# Patient Record
Sex: FEMALE | Race: WHITE | NOT HISPANIC OR LATINO | Employment: UNEMPLOYED | URBAN - METROPOLITAN AREA
[De-identification: names, ages, dates, MRNs, and addresses within clinical notes are randomized per-mention and may not be internally consistent; named-entity substitution may affect disease eponyms.]

---

## 2019-06-21 NOTE — PRE-PROCEDURE INSTRUCTIONS
No outpatient medications have been marked as taking for the 6/28/19 encounter [facility] Encounter)  Spoke to pt  No reported medication use  As of 6/21 advised on tylenol only  Am DOS no meds  Showering instructions given at time of call  All instructions verbally understood by patient  No further questions

## 2019-06-27 ENCOUNTER — ANESTHESIA EVENT (OUTPATIENT)
Dept: PERIOP | Facility: HOSPITAL | Age: 19
DRG: 089 | End: 2019-06-27
Payer: COMMERCIAL

## 2019-06-28 ENCOUNTER — ANESTHESIA EVENT (INPATIENT)
Dept: PERIOP | Facility: HOSPITAL | Age: 19
DRG: 089 | End: 2019-06-28
Payer: COMMERCIAL

## 2019-06-28 ENCOUNTER — ANESTHESIA (INPATIENT)
Dept: PERIOP | Facility: HOSPITAL | Age: 19
DRG: 089 | End: 2019-06-28
Payer: COMMERCIAL

## 2019-06-28 ENCOUNTER — ANESTHESIA (OUTPATIENT)
Dept: PERIOP | Facility: HOSPITAL | Age: 19
DRG: 089 | End: 2019-06-28
Payer: COMMERCIAL

## 2019-06-28 ENCOUNTER — HOSPITAL ENCOUNTER (INPATIENT)
Facility: HOSPITAL | Age: 19
LOS: 10 days | Discharge: HOME | DRG: 089 | End: 2019-07-08
Attending: ORAL & MAXILLOFACIAL SURGERY | Admitting: ORAL & MAXILLOFACIAL SURGERY
Payer: COMMERCIAL

## 2019-06-28 ENCOUNTER — APPOINTMENT (INPATIENT)
Dept: RADIOLOGY | Facility: HOSPITAL | Age: 19
DRG: 089 | End: 2019-06-28
Payer: COMMERCIAL

## 2019-06-28 DIAGNOSIS — R11.2 POST-OPERATIVE NAUSEA AND VOMITING: ICD-10-CM

## 2019-06-28 DIAGNOSIS — K81.0 CHOLECYSTITIS, ACUTE: ICD-10-CM

## 2019-06-28 DIAGNOSIS — R50.9 FEVER: ICD-10-CM

## 2019-06-28 DIAGNOSIS — R74.8 ELEVATED LIVER ENZYMES: ICD-10-CM

## 2019-06-28 DIAGNOSIS — M26.9: Primary | ICD-10-CM

## 2019-06-28 DIAGNOSIS — D62 ACUTE BLOOD LOSS ANEMIA: ICD-10-CM

## 2019-06-28 DIAGNOSIS — J96.00 ACUTE RESPIRATORY FAILURE (HCC): ICD-10-CM

## 2019-06-28 DIAGNOSIS — T17.908A ASPIRATION INTO AIRWAY: ICD-10-CM

## 2019-06-28 DIAGNOSIS — E87.2 LACTIC ACIDOSIS: ICD-10-CM

## 2019-06-28 DIAGNOSIS — R00.0 TACHYCARDIA: ICD-10-CM

## 2019-06-28 DIAGNOSIS — Z98.890 POST-OPERATIVE NAUSEA AND VOMITING: ICD-10-CM

## 2019-06-28 LAB
ABO GROUP BLD: NORMAL
ANION GAP SERPL CALCULATED.3IONS-SCNC: 10 MMOL/L (ref 4–13)
ANION GAP SERPL CALCULATED.3IONS-SCNC: 6 MMOL/L (ref 4–13)
ARTERIAL PATENCY WRIST A: YES
ARTERIAL PATENCY WRIST A: YES
BASE EXCESS BLDA CALC-SCNC: -8.7 MMOL/L
BASE EXCESS BLDA CALC-SCNC: -9.3 MMOL/L
BASOPHILS # BLD MANUAL: 0 THOUSAND/UL (ref 0–0.1)
BASOPHILS NFR MAR MANUAL: 0 % (ref 0–1)
BLD GP AB SCN SERPL QL: NEGATIVE
BODY TEMPERATURE: 99.1 DEGREES FEHRENHEIT
BUN SERPL-MCNC: 12 MG/DL (ref 5–25)
BUN SERPL-MCNC: 13 MG/DL (ref 5–25)
CALCIUM SERPL-MCNC: 7.5 MG/DL (ref 8.3–10.1)
CALCIUM SERPL-MCNC: 9.3 MG/DL (ref 8.3–10.1)
CHLORIDE SERPL-SCNC: 106 MMOL/L (ref 100–108)
CHLORIDE SERPL-SCNC: 109 MMOL/L (ref 100–108)
CO2 SERPL-SCNC: 19 MMOL/L (ref 21–32)
CO2 SERPL-SCNC: 28 MMOL/L (ref 21–32)
CREAT SERPL-MCNC: 0.84 MG/DL (ref 0.6–1.3)
CREAT SERPL-MCNC: 1.05 MG/DL (ref 0.6–1.3)
EOSINOPHIL # BLD MANUAL: 0 THOUSAND/UL (ref 0–0.4)
EOSINOPHIL NFR BLD MANUAL: 0 % (ref 0–6)
ERYTHROCYTE [DISTWIDTH] IN BLOOD BY AUTOMATED COUNT: 12.7 % (ref 11.6–15.1)
ERYTHROCYTE [DISTWIDTH] IN BLOOD BY AUTOMATED COUNT: 12.8 % (ref 11.6–15.1)
ERYTHROCYTE [DISTWIDTH] IN BLOOD BY AUTOMATED COUNT: 12.9 % (ref 11.6–15.1)
EXT PREGNANCY TEST URINE: NEGATIVE
EXT. CONTROL: NORMAL
GFR SERPL CREATININE-BSD FRML MDRD: 100 ML/MIN/1.73SQ M
GFR SERPL CREATININE-BSD FRML MDRD: 77 ML/MIN/1.73SQ M
GLUCOSE P FAST SERPL-MCNC: 92 MG/DL (ref 65–99)
GLUCOSE SERPL-MCNC: 196 MG/DL (ref 65–140)
GLUCOSE SERPL-MCNC: 92 MG/DL (ref 65–140)
HCO3 BLDA-SCNC: 16.6 MMOL/L (ref 22–28)
HCO3 BLDA-SCNC: 17.2 MMOL/L (ref 22–28)
HCT VFR BLD AUTO: 27.7 % (ref 34.8–46.1)
HCT VFR BLD AUTO: 29 % (ref 34.8–46.1)
HCT VFR BLD AUTO: 41.8 % (ref 34.8–46.1)
HFNC FLOW LPM: 50
HGB BLD-MCNC: 13.7 G/DL (ref 11.5–15.4)
HGB BLD-MCNC: 8.9 G/DL (ref 11.5–15.4)
HGB BLD-MCNC: 9.4 G/DL (ref 11.5–15.4)
LACTATE SERPL-SCNC: 5.3 MMOL/L (ref 0.5–2)
LACTATE SERPL-SCNC: 7.2 MMOL/L (ref 0.5–2)
LYMPHOCYTES # BLD AUTO: 0.68 THOUSAND/UL (ref 0.6–4.47)
LYMPHOCYTES # BLD AUTO: 6 % (ref 14–44)
MCH RBC QN AUTO: 30 PG (ref 26.8–34.3)
MCH RBC QN AUTO: 30.5 PG (ref 26.8–34.3)
MCH RBC QN AUTO: 30.7 PG (ref 26.8–34.3)
MCHC RBC AUTO-ENTMCNC: 32.1 G/DL (ref 31.4–37.4)
MCHC RBC AUTO-ENTMCNC: 32.4 G/DL (ref 31.4–37.4)
MCHC RBC AUTO-ENTMCNC: 32.8 G/DL (ref 31.4–37.4)
MCV RBC AUTO: 92 FL (ref 82–98)
MCV RBC AUTO: 95 FL (ref 82–98)
MCV RBC AUTO: 95 FL (ref 82–98)
METAMYELOCYTES NFR BLD MANUAL: 1 % (ref 0–1)
MONOCYTES # BLD AUTO: 0.45 THOUSAND/UL (ref 0–1.22)
MONOCYTES NFR BLD: 4 % (ref 4–12)
MYELOCYTES NFR BLD MANUAL: 1 % (ref 0–1)
NEUTROPHILS # BLD MANUAL: 10 THOUSAND/UL (ref 1.85–7.62)
NEUTS BAND NFR BLD MANUAL: 26 % (ref 0–8)
NEUTS SEG NFR BLD AUTO: 62 % (ref 43–75)
NON VENT HFNC FIO2: 100
NON VENT TYPE HFNC: ABNORMAL
NON VENT TYPE- NRB: 100
NRBC BLD AUTO-RTO: 0 /100 WBCS
NRBC BLD AUTO-RTO: 0 /100 WBCS
O2 CT BLDA-SCNC: 12.7 ML/DL (ref 16–23)
O2 CT BLDA-SCNC: 13.1 ML/DL (ref 16–23)
OXYHGB MFR BLDA: 89.5 % (ref 94–97)
OXYHGB MFR BLDA: 91.4 % (ref 94–97)
PCO2 BLDA: 36.3 MM HG (ref 36–44)
PCO2 BLDA: 37.2 MM HG (ref 36–44)
PH BLDA: 7.28 [PH] (ref 7.35–7.45)
PH BLDA: 7.28 [PH] (ref 7.35–7.45)
PLATELET # BLD AUTO: 142 THOUSANDS/UL (ref 149–390)
PLATELET # BLD AUTO: 189 THOUSANDS/UL (ref 149–390)
PLATELET # BLD AUTO: 230 THOUSANDS/UL (ref 149–390)
PLATELET BLD QL SMEAR: ADEQUATE
PMV BLD AUTO: 10.4 FL (ref 8.9–12.7)
PMV BLD AUTO: 10.6 FL (ref 8.9–12.7)
PMV BLD AUTO: 10.6 FL (ref 8.9–12.7)
PO2 BLDA: 60.5 MM HG (ref 75–129)
PO2 BLDA: 65.4 MM HG (ref 75–129)
POIKILOCYTOSIS BLD QL SMEAR: PRESENT
POTASSIUM SERPL-SCNC: 3.9 MMOL/L (ref 3.5–5.3)
POTASSIUM SERPL-SCNC: 3.9 MMOL/L (ref 3.5–5.3)
RBC # BLD AUTO: 2.92 MILLION/UL (ref 3.81–5.12)
RBC # BLD AUTO: 3.06 MILLION/UL (ref 3.81–5.12)
RBC # BLD AUTO: 4.57 MILLION/UL (ref 3.81–5.12)
RBC MORPH BLD: PRESENT
RH BLD: POSITIVE
SODIUM SERPL-SCNC: 138 MMOL/L (ref 136–145)
SODIUM SERPL-SCNC: 140 MMOL/L (ref 136–145)
SPECIMEN EXPIRATION DATE: NORMAL
SPECIMEN SOURCE: ABNORMAL
SPECIMEN SOURCE: ABNORMAL
WBC # BLD AUTO: 11.36 THOUSAND/UL (ref 4.31–10.16)
WBC # BLD AUTO: 16.21 THOUSAND/UL (ref 4.31–10.16)
WBC # BLD AUTO: 9.08 THOUSAND/UL (ref 4.31–10.16)

## 2019-06-28 PROCEDURE — 85025 COMPLETE CBC W/AUTO DIFF WBC: CPT | Performed by: STUDENT IN AN ORGANIZED HEALTH CARE EDUCATION/TRAINING PROGRAM

## 2019-06-28 PROCEDURE — 0NUV0JZ SUPPLEMENT LEFT MANDIBLE WITH SYNTHETIC SUBSTITUTE, OPEN APPROACH: ICD-10-PCS | Performed by: ORAL & MAXILLOFACIAL SURGERY

## 2019-06-28 PROCEDURE — 5A1945Z RESPIRATORY VENTILATION, 24-96 CONSECUTIVE HOURS: ICD-10-PCS | Performed by: ORAL & MAXILLOFACIAL SURGERY

## 2019-06-28 PROCEDURE — 82947 ASSAY GLUCOSE BLOOD QUANT: CPT

## 2019-06-28 PROCEDURE — 0BH17EZ INSERTION OF ENDOTRACHEAL AIRWAY INTO TRACHEA, VIA NATURAL OR ARTIFICIAL OPENING: ICD-10-PCS | Performed by: ORAL & MAXILLOFACIAL SURGERY

## 2019-06-28 PROCEDURE — 84132 ASSAY OF SERUM POTASSIUM: CPT

## 2019-06-28 PROCEDURE — 85027 COMPLETE CBC AUTOMATED: CPT | Performed by: EMERGENCY MEDICINE

## 2019-06-28 PROCEDURE — P9016 RBC LEUKOCYTES REDUCED: HCPCS

## 2019-06-28 PROCEDURE — 0NUT0JZ SUPPLEMENT RIGHT MANDIBLE WITH SYNTHETIC SUBSTITUTE, OPEN APPROACH: ICD-10-PCS | Performed by: ORAL & MAXILLOFACIAL SURGERY

## 2019-06-28 PROCEDURE — 83605 ASSAY OF LACTIC ACID: CPT | Performed by: EMERGENCY MEDICINE

## 2019-06-28 PROCEDURE — 82803 BLOOD GASES ANY COMBINATION: CPT

## 2019-06-28 PROCEDURE — 99291 CRITICAL CARE FIRST HOUR: CPT | Performed by: SURGERY

## 2019-06-28 PROCEDURE — 0NSR04Z REPOSITION MAXILLA WITH INTERNAL FIXATION DEVICE, OPEN APPROACH: ICD-10-PCS | Performed by: ORAL & MAXILLOFACIAL SURGERY

## 2019-06-28 PROCEDURE — 0NST04Z REPOSITION RIGHT MANDIBLE WITH INTERNAL FIXATION DEVICE, OPEN APPROACH: ICD-10-PCS | Performed by: ORAL & MAXILLOFACIAL SURGERY

## 2019-06-28 PROCEDURE — C1713 ANCHOR/SCREW BN/BN,TIS/BN: HCPCS | Performed by: ORAL & MAXILLOFACIAL SURGERY

## 2019-06-28 PROCEDURE — 80048 BASIC METABOLIC PNL TOTAL CA: CPT | Performed by: STUDENT IN AN ORGANIZED HEALTH CARE EDUCATION/TRAINING PROGRAM

## 2019-06-28 PROCEDURE — 85007 BL SMEAR W/DIFF WBC COUNT: CPT | Performed by: EMERGENCY MEDICINE

## 2019-06-28 PROCEDURE — 86920 COMPATIBILITY TEST SPIN: CPT

## 2019-06-28 PROCEDURE — 0BJ08ZZ INSPECTION OF TRACHEOBRONCHIAL TREE, VIA NATURAL OR ARTIFICIAL OPENING ENDOSCOPIC: ICD-10-PCS | Performed by: ORAL & MAXILLOFACIAL SURGERY

## 2019-06-28 PROCEDURE — 71045 X-RAY EXAM CHEST 1 VIEW: CPT

## 2019-06-28 PROCEDURE — 82330 ASSAY OF CALCIUM: CPT

## 2019-06-28 PROCEDURE — 94002 VENT MGMT INPAT INIT DAY: CPT

## 2019-06-28 PROCEDURE — 85014 HEMATOCRIT: CPT

## 2019-06-28 PROCEDURE — 82805 BLOOD GASES W/O2 SATURATION: CPT | Performed by: STUDENT IN AN ORGANIZED HEALTH CARE EDUCATION/TRAINING PROGRAM

## 2019-06-28 PROCEDURE — 80048 BASIC METABOLIC PNL TOTAL CA: CPT | Performed by: NURSE ANESTHETIST, CERTIFIED REGISTERED

## 2019-06-28 PROCEDURE — 86900 BLOOD TYPING SEROLOGIC ABO: CPT | Performed by: NURSE ANESTHETIST, CERTIFIED REGISTERED

## 2019-06-28 PROCEDURE — 94640 AIRWAY INHALATION TREATMENT: CPT

## 2019-06-28 PROCEDURE — 0NUR0JZ SUPPLEMENT MAXILLA WITH SYNTHETIC SUBSTITUTE, OPEN APPROACH: ICD-10-PCS | Performed by: ORAL & MAXILLOFACIAL SURGERY

## 2019-06-28 PROCEDURE — 30233N1 TRANSFUSION OF NONAUTOLOGOUS RED BLOOD CELLS INTO PERIPHERAL VEIN, PERCUTANEOUS APPROACH: ICD-10-PCS | Performed by: ORAL & MAXILLOFACIAL SURGERY

## 2019-06-28 PROCEDURE — 94760 N-INVAS EAR/PLS OXIMETRY 1: CPT

## 2019-06-28 PROCEDURE — 09BL0ZZ EXCISION OF NASAL TURBINATE, OPEN APPROACH: ICD-10-PCS | Performed by: ORAL & MAXILLOFACIAL SURGERY

## 2019-06-28 PROCEDURE — 31622 DX BRONCHOSCOPE/WASH: CPT | Performed by: SURGERY

## 2019-06-28 PROCEDURE — 0NSV04Z REPOSITION LEFT MANDIBLE WITH INTERNAL FIXATION DEVICE, OPEN APPROACH: ICD-10-PCS | Performed by: ORAL & MAXILLOFACIAL SURGERY

## 2019-06-28 PROCEDURE — 86901 BLOOD TYPING SEROLOGIC RH(D): CPT | Performed by: NURSE ANESTHETIST, CERTIFIED REGISTERED

## 2019-06-28 PROCEDURE — 09SM0ZZ REPOSITION NASAL SEPTUM, OPEN APPROACH: ICD-10-PCS | Performed by: ORAL & MAXILLOFACIAL SURGERY

## 2019-06-28 PROCEDURE — 83605 ASSAY OF LACTIC ACID: CPT | Performed by: PHYSICIAN ASSISTANT

## 2019-06-28 PROCEDURE — 84295 ASSAY OF SERUM SODIUM: CPT

## 2019-06-28 PROCEDURE — 85027 COMPLETE CBC AUTOMATED: CPT | Performed by: NURSE ANESTHETIST, CERTIFIED REGISTERED

## 2019-06-28 PROCEDURE — 86850 RBC ANTIBODY SCREEN: CPT | Performed by: NURSE ANESTHETIST, CERTIFIED REGISTERED

## 2019-06-28 PROCEDURE — 82805 BLOOD GASES W/O2 SATURATION: CPT | Performed by: EMERGENCY MEDICINE

## 2019-06-28 DEVICE — 1.85MM TI MATRIX SCREW/COARSE PITCH/SELF-TAPPING/14MM
Type: IMPLANTABLE DEVICE | Site: MANDIBLE | Status: FUNCTIONAL
Brand: MATRIX

## 2019-06-28 DEVICE — TI MATRIX OBLIQUE L-PL/3X3 H LONG/REVERSIBLE/0.7MM THK
Type: IMPLANTABLE DEVICE | Site: MANDIBLE | Status: FUNCTIONAL
Brand: MATRIX

## 2019-06-28 DEVICE — TI MATRIX PRE-BENT MAXILLARY PLATE/6MM ADVMNT/0.8MM THK/R
Type: IMPLANTABLE DEVICE | Site: MANDIBLE | Status: FUNCTIONAL
Brand: MATRIX

## 2019-06-28 DEVICE — TI MATRIXMIDFACE ADAPTION PLATE/20 HOLES/0.7MM THICK
Type: IMPLANTABLE DEVICE | Site: MANDIBLE | Status: FUNCTIONAL
Brand: MATRIXMIDFACE

## 2019-06-28 DEVICE — TI MATRIX OBLIQUE L-PL/3X3 H MEDIUM/REVERSIBLE/0.7MM THK
Type: IMPLANTABLE DEVICE | Site: MANDIBLE | Status: FUNCTIONAL
Brand: MATRIX

## 2019-06-28 DEVICE — 2.0MM TI MATRIXMANDIBLE SCREW SELF-TAPPING 8MM
Type: IMPLANTABLE DEVICE | Site: MANDIBLE | Status: FUNCTIONAL
Brand: MATRIXMANDIBLE

## 2019-06-28 DEVICE — TI MATRIXMANDIBLE 12 HOLE ADAPTION PL 1.0MM THICK
Type: IMPLANTABLE DEVICE | Site: MANDIBLE | Status: FUNCTIONAL
Brand: MATRIXMANDIBLE

## 2019-06-28 DEVICE — 1.85MM TI MATRIX SCREW SELF-DRILLING/6MM
Type: IMPLANTABLE DEVICE | Site: MANDIBLE | Status: FUNCTIONAL
Brand: MATRIX

## 2019-06-28 DEVICE — 2.0MM TI MATRIXMANDIBLE SCREW SELF-TAPPING 6MM
Type: IMPLANTABLE DEVICE | Site: MANDIBLE | Status: FUNCTIONAL
Brand: MATRIXMANDIBLE

## 2019-06-28 DEVICE — TI MATRIX PRE-BENT MAXILLARY PLATE/6MM ADVMNT/0.8MM THK/L
Type: IMPLANTABLE DEVICE | Site: MANDIBLE | Status: FUNCTIONAL
Brand: MATRIX

## 2019-06-28 RX ORDER — LIDOCAINE HYDROCHLORIDE 10 MG/ML
INJECTION, SOLUTION INFILTRATION; PERINEURAL AS NEEDED
Status: DISCONTINUED | OUTPATIENT
Start: 2019-06-28 | End: 2019-06-28 | Stop reason: SURG

## 2019-06-28 RX ORDER — ALBUMIN, HUMAN INJ 5% 5 %
12.5 SOLUTION INTRAVENOUS ONCE
Status: DISCONTINUED | OUTPATIENT
Start: 2019-06-28 | End: 2019-06-28

## 2019-06-28 RX ORDER — ONDANSETRON 2 MG/ML
4 INJECTION INTRAMUSCULAR; INTRAVENOUS EVERY 8 HOURS PRN
Status: DISCONTINUED | OUTPATIENT
Start: 2019-06-28 | End: 2019-07-02

## 2019-06-28 RX ORDER — ALBUMIN, HUMAN INJ 5% 5 %
12.5 SOLUTION INTRAVENOUS 2 TIMES DAILY
Status: DISCONTINUED | OUTPATIENT
Start: 2019-06-28 | End: 2019-06-28 | Stop reason: HOSPADM

## 2019-06-28 RX ORDER — METOCLOPRAMIDE HYDROCHLORIDE 5 MG/ML
INJECTION INTRAMUSCULAR; INTRAVENOUS AS NEEDED
Status: DISCONTINUED | OUTPATIENT
Start: 2019-06-28 | End: 2019-06-28 | Stop reason: SURG

## 2019-06-28 RX ORDER — MIDAZOLAM HYDROCHLORIDE 1 MG/ML
INJECTION INTRAMUSCULAR; INTRAVENOUS AS NEEDED
Status: DISCONTINUED | OUTPATIENT
Start: 2019-06-28 | End: 2019-06-28 | Stop reason: SURG

## 2019-06-28 RX ORDER — HYDROMORPHONE HCL/PF 1 MG/ML
0.2 SYRINGE (ML) INJECTION
Status: DISCONTINUED | OUTPATIENT
Start: 2019-06-28 | End: 2019-06-28 | Stop reason: HOSPADM

## 2019-06-28 RX ORDER — CHLORHEXIDINE GLUCONATE 0.12 MG/ML
375 RINSE ORAL ONCE
Status: COMPLETED | OUTPATIENT
Start: 2019-06-28 | End: 2019-06-28

## 2019-06-28 RX ORDER — PROMETHAZINE HYDROCHLORIDE 25 MG/ML
25 INJECTION, SOLUTION INTRAMUSCULAR; INTRAVENOUS ONCE AS NEEDED
Status: DISCONTINUED | OUTPATIENT
Start: 2019-06-28 | End: 2019-06-28 | Stop reason: HOSPADM

## 2019-06-28 RX ORDER — SUCCINYLCHOLINE/SOD CL,ISO/PF 100 MG/5ML
SYRINGE (ML) INTRAVENOUS AS NEEDED
Status: DISCONTINUED | OUTPATIENT
Start: 2019-06-28 | End: 2019-06-28 | Stop reason: SURG

## 2019-06-28 RX ORDER — FENTANYL CITRATE 50 UG/ML
25 INJECTION, SOLUTION INTRAMUSCULAR; INTRAVENOUS EVERY 2 HOUR PRN
Status: DISCONTINUED | OUTPATIENT
Start: 2019-06-28 | End: 2019-06-29

## 2019-06-28 RX ORDER — ROCURONIUM BROMIDE 10 MG/ML
INJECTION, SOLUTION INTRAVENOUS AS NEEDED
Status: DISCONTINUED | OUTPATIENT
Start: 2019-06-28 | End: 2019-06-28 | Stop reason: SURG

## 2019-06-28 RX ORDER — CHLORHEXIDINE GLUCONATE 0.12 MG/ML
30 RINSE ORAL
Status: DISCONTINUED | OUTPATIENT
Start: 2019-06-28 | End: 2019-06-29

## 2019-06-28 RX ORDER — BUPIVACAINE HYDROCHLORIDE AND EPINEPHRINE 5; 5 MG/ML; UG/ML
INJECTION, SOLUTION PERINEURAL AS NEEDED
Status: DISCONTINUED | OUTPATIENT
Start: 2019-06-28 | End: 2019-06-28 | Stop reason: HOSPADM

## 2019-06-28 RX ORDER — SODIUM CHLORIDE 9 MG/ML
INJECTION, SOLUTION INTRAVENOUS CONTINUOUS PRN
Status: DISCONTINUED | OUTPATIENT
Start: 2019-06-28 | End: 2019-06-28 | Stop reason: SURG

## 2019-06-28 RX ORDER — FENTANYL CITRATE 50 UG/ML
50 INJECTION, SOLUTION INTRAMUSCULAR; INTRAVENOUS ONCE
Status: COMPLETED | OUTPATIENT
Start: 2019-06-28 | End: 2019-06-28

## 2019-06-28 RX ORDER — DEXAMETHASONE SODIUM PHOSPHATE 4 MG/ML
INJECTION, SOLUTION INTRA-ARTICULAR; INTRALESIONAL; INTRAMUSCULAR; INTRAVENOUS; SOFT TISSUE AS NEEDED
Status: DISCONTINUED | OUTPATIENT
Start: 2019-06-28 | End: 2019-06-28

## 2019-06-28 RX ORDER — FENTANYL CITRATE 50 UG/ML
25 INJECTION, SOLUTION INTRAMUSCULAR; INTRAVENOUS ONCE
Status: COMPLETED | OUTPATIENT
Start: 2019-06-28 | End: 2019-06-28

## 2019-06-28 RX ORDER — FENTANYL CITRATE 50 UG/ML
50 INJECTION, SOLUTION INTRAMUSCULAR; INTRAVENOUS EVERY 2 HOUR PRN
Status: DISCONTINUED | OUTPATIENT
Start: 2019-06-28 | End: 2019-06-29

## 2019-06-28 RX ORDER — EPHEDRINE SULFATE 50 MG/ML
INJECTION INTRAVENOUS AS NEEDED
Status: DISCONTINUED | OUTPATIENT
Start: 2019-06-28 | End: 2019-06-28 | Stop reason: SURG

## 2019-06-28 RX ORDER — HYDROMORPHONE HYDROCHLORIDE 2 MG/ML
INJECTION, SOLUTION INTRAMUSCULAR; INTRAVENOUS; SUBCUTANEOUS AS NEEDED
Status: DISCONTINUED | OUTPATIENT
Start: 2019-06-28 | End: 2019-06-28 | Stop reason: SURG

## 2019-06-28 RX ORDER — ACETAMINOPHEN 160 MG/5ML
650 SUSPENSION, ORAL (FINAL DOSE FORM) ORAL EVERY 4 HOURS PRN
Status: DISCONTINUED | OUTPATIENT
Start: 2019-06-29 | End: 2019-06-29

## 2019-06-28 RX ORDER — ONDANSETRON 2 MG/ML
4 INJECTION INTRAMUSCULAR; INTRAVENOUS ONCE AS NEEDED
Status: DISCONTINUED | OUTPATIENT
Start: 2019-06-28 | End: 2019-06-28 | Stop reason: HOSPADM

## 2019-06-28 RX ORDER — GLYCOPYRROLATE 0.2 MG/ML
INJECTION INTRAMUSCULAR; INTRAVENOUS AS NEEDED
Status: DISCONTINUED | OUTPATIENT
Start: 2019-06-28 | End: 2019-06-28 | Stop reason: SURG

## 2019-06-28 RX ORDER — FENTANYL CITRATE 50 UG/ML
INJECTION, SOLUTION INTRAMUSCULAR; INTRAVENOUS
Status: COMPLETED
Start: 2019-06-28 | End: 2019-06-28

## 2019-06-28 RX ORDER — ALBUTEROL SULFATE 2.5 MG/3ML
2.5 SOLUTION RESPIRATORY (INHALATION) EVERY 6 HOURS PRN
Status: DISCONTINUED | OUTPATIENT
Start: 2019-06-28 | End: 2019-06-28

## 2019-06-28 RX ORDER — ONDANSETRON 2 MG/ML
INJECTION INTRAMUSCULAR; INTRAVENOUS AS NEEDED
Status: DISCONTINUED | OUTPATIENT
Start: 2019-06-28 | End: 2019-06-28 | Stop reason: SURG

## 2019-06-28 RX ORDER — PROPOFOL 10 MG/ML
5-50 INJECTION, EMULSION INTRAVENOUS
Status: DISCONTINUED | OUTPATIENT
Start: 2019-06-28 | End: 2019-06-30

## 2019-06-28 RX ORDER — SODIUM CHLORIDE, SODIUM GLUCONATE, SODIUM ACETATE, POTASSIUM CHLORIDE, MAGNESIUM CHLORIDE, SODIUM PHOSPHATE, DIBASIC, AND POTASSIUM PHOSPHATE .53; .5; .37; .037; .03; .012; .00082 G/100ML; G/100ML; G/100ML; G/100ML; G/100ML; G/100ML; G/100ML
1000 INJECTION, SOLUTION INTRAVENOUS ONCE
Status: COMPLETED | OUTPATIENT
Start: 2019-06-28 | End: 2019-06-28

## 2019-06-28 RX ORDER — SODIUM CHLORIDE, SODIUM LACTATE, POTASSIUM CHLORIDE, CALCIUM CHLORIDE 600; 310; 30; 20 MG/100ML; MG/100ML; MG/100ML; MG/100ML
125 INJECTION, SOLUTION INTRAVENOUS CONTINUOUS
Status: DISCONTINUED | OUTPATIENT
Start: 2019-06-28 | End: 2019-06-28

## 2019-06-28 RX ORDER — CEFAZOLIN SODIUM 1 G/50ML
1000 SOLUTION INTRAVENOUS EVERY 8 HOURS
Status: DISCONTINUED | OUTPATIENT
Start: 2019-06-28 | End: 2019-06-28

## 2019-06-28 RX ORDER — CHLORHEXIDINE GLUCONATE 0.12 MG/ML
473 RINSE ORAL ONCE
Status: DISCONTINUED | OUTPATIENT
Start: 2019-06-28 | End: 2019-06-28 | Stop reason: RX

## 2019-06-28 RX ORDER — FENTANYL CITRATE 50 UG/ML
INJECTION, SOLUTION INTRAMUSCULAR; INTRAVENOUS
Status: DISPENSED
Start: 2019-06-28 | End: 2019-06-29

## 2019-06-28 RX ORDER — LORAZEPAM 2 MG/ML
0.25 INJECTION INTRAMUSCULAR ONCE AS NEEDED
Status: DISCONTINUED | OUTPATIENT
Start: 2019-06-28 | End: 2019-06-29

## 2019-06-28 RX ORDER — SODIUM CHLORIDE, SODIUM GLUCONATE, SODIUM ACETATE, POTASSIUM CHLORIDE, MAGNESIUM CHLORIDE, SODIUM PHOSPHATE, DIBASIC, AND POTASSIUM PHOSPHATE .53; .5; .37; .037; .03; .012; .00082 G/100ML; G/100ML; G/100ML; G/100ML; G/100ML; G/100ML; G/100ML
75 INJECTION, SOLUTION INTRAVENOUS CONTINUOUS
Status: DISCONTINUED | OUTPATIENT
Start: 2019-06-28 | End: 2019-06-30

## 2019-06-28 RX ORDER — LIDOCAINE HYDROCHLORIDE AND EPINEPHRINE 10; 10 MG/ML; UG/ML
INJECTION, SOLUTION INFILTRATION; PERINEURAL AS NEEDED
Status: DISCONTINUED | OUTPATIENT
Start: 2019-06-28 | End: 2019-06-28 | Stop reason: HOSPADM

## 2019-06-28 RX ORDER — ALBUTEROL SULFATE 2.5 MG/3ML
2.5 SOLUTION RESPIRATORY (INHALATION) EVERY 6 HOURS PRN
Status: DISCONTINUED | OUTPATIENT
Start: 2019-06-28 | End: 2019-07-05

## 2019-06-28 RX ORDER — ECHINACEA PURPUREA EXTRACT 125 MG
1 TABLET ORAL 4 TIMES DAILY
Status: DISCONTINUED | OUTPATIENT
Start: 2019-06-28 | End: 2019-07-08 | Stop reason: HOSPADM

## 2019-06-28 RX ORDER — SODIUM CHLORIDE, SODIUM LACTATE, POTASSIUM CHLORIDE, CALCIUM CHLORIDE 600; 310; 30; 20 MG/100ML; MG/100ML; MG/100ML; MG/100ML
100 INJECTION, SOLUTION INTRAVENOUS CONTINUOUS
Status: DISCONTINUED | OUTPATIENT
Start: 2019-06-28 | End: 2019-06-28

## 2019-06-28 RX ORDER — PROPOFOL 10 MG/ML
INJECTION, EMULSION INTRAVENOUS AS NEEDED
Status: DISCONTINUED | OUTPATIENT
Start: 2019-06-28 | End: 2019-06-28 | Stop reason: SURG

## 2019-06-28 RX ORDER — FENTANYL CITRATE/PF 50 MCG/ML
25 SYRINGE (ML) INJECTION
Status: DISCONTINUED | OUTPATIENT
Start: 2019-06-28 | End: 2019-06-28 | Stop reason: HOSPADM

## 2019-06-28 RX ORDER — LORAZEPAM 2 MG/ML
0.25 INJECTION INTRAMUSCULAR ONCE
Status: DISCONTINUED | OUTPATIENT
Start: 2019-06-28 | End: 2019-06-28

## 2019-06-28 RX ORDER — LORAZEPAM 2 MG/ML
0.5 INJECTION INTRAMUSCULAR ONCE
Status: COMPLETED | OUTPATIENT
Start: 2019-06-28 | End: 2019-06-28

## 2019-06-28 RX ORDER — ALBUMIN, HUMAN INJ 5% 5 %
SOLUTION INTRAVENOUS CONTINUOUS PRN
Status: DISCONTINUED | OUTPATIENT
Start: 2019-06-28 | End: 2019-06-28 | Stop reason: SURG

## 2019-06-28 RX ORDER — BALANCED SALT SOLUTION 6.4; .75; .48; .3; 3.9; 1.7 MG/ML; MG/ML; MG/ML; MG/ML; MG/ML; MG/ML
SOLUTION OPHTHALMIC AS NEEDED
Status: DISCONTINUED | OUTPATIENT
Start: 2019-06-28 | End: 2019-06-28 | Stop reason: HOSPADM

## 2019-06-28 RX ORDER — HYDROMORPHONE HCL/PF 1 MG/ML
0.5 SYRINGE (ML) INJECTION EVERY 4 HOURS PRN
Status: DISCONTINUED | OUTPATIENT
Start: 2019-06-28 | End: 2019-06-29

## 2019-06-28 RX ORDER — CEFAZOLIN SODIUM 2 G/50ML
2000 SOLUTION INTRAVENOUS ONCE
Status: COMPLETED | OUTPATIENT
Start: 2019-06-28 | End: 2019-06-28

## 2019-06-28 RX ORDER — DEXAMETHASONE SODIUM PHOSPHATE 10 MG/ML
INJECTION, SOLUTION INTRAMUSCULAR; INTRAVENOUS AS NEEDED
Status: DISCONTINUED | OUTPATIENT
Start: 2019-06-28 | End: 2019-06-28 | Stop reason: SURG

## 2019-06-28 RX ORDER — SODIUM CHLORIDE, SODIUM GLUCONATE, SODIUM ACETATE, POTASSIUM CHLORIDE, MAGNESIUM CHLORIDE, SODIUM PHOSPHATE, DIBASIC, AND POTASSIUM PHOSPHATE .53; .5; .37; .037; .03; .012; .00082 G/100ML; G/100ML; G/100ML; G/100ML; G/100ML; G/100ML; G/100ML
1000 INJECTION, SOLUTION INTRAVENOUS ONCE
Status: COMPLETED | OUTPATIENT
Start: 2019-06-28 | End: 2019-06-29

## 2019-06-28 RX ORDER — FENTANYL CITRATE 50 UG/ML
INJECTION, SOLUTION INTRAMUSCULAR; INTRAVENOUS AS NEEDED
Status: DISCONTINUED | OUTPATIENT
Start: 2019-06-28 | End: 2019-06-28 | Stop reason: SURG

## 2019-06-28 RX ORDER — METOCLOPRAMIDE HYDROCHLORIDE 5 MG/ML
10 INJECTION INTRAMUSCULAR; INTRAVENOUS EVERY 6 HOURS PRN
Status: DISCONTINUED | OUTPATIENT
Start: 2019-06-28 | End: 2019-07-05

## 2019-06-28 RX ORDER — METOPROLOL TARTRATE 5 MG/5ML
INJECTION INTRAVENOUS AS NEEDED
Status: DISCONTINUED | OUTPATIENT
Start: 2019-06-28 | End: 2019-06-28 | Stop reason: SURG

## 2019-06-28 RX ORDER — SODIUM CHLORIDE 9 MG/ML
75 INJECTION, SOLUTION INTRAVENOUS CONTINUOUS
Status: DISCONTINUED | OUTPATIENT
Start: 2019-06-28 | End: 2019-06-28

## 2019-06-28 RX ORDER — LIDOCAINE HYDROCHLORIDE AND EPINEPHRINE 5; 5 MG/ML; UG/ML
INJECTION, SOLUTION INFILTRATION; PERINEURAL AS NEEDED
Status: DISCONTINUED | OUTPATIENT
Start: 2019-06-28 | End: 2019-06-28 | Stop reason: HOSPADM

## 2019-06-28 RX ORDER — METOCLOPRAMIDE HYDROCHLORIDE 5 MG/ML
10 INJECTION INTRAMUSCULAR; INTRAVENOUS EVERY 6 HOURS PRN
Status: DISCONTINUED | OUTPATIENT
Start: 2019-06-28 | End: 2019-06-28

## 2019-06-28 RX ORDER — OXYCODONE HCL 5 MG/5 ML
5 SOLUTION, ORAL ORAL EVERY 4 HOURS PRN
Status: DISCONTINUED | OUTPATIENT
Start: 2019-06-28 | End: 2019-06-28

## 2019-06-28 RX ORDER — ALBUTEROL SULFATE 2.5 MG/3ML
SOLUTION RESPIRATORY (INHALATION)
Status: COMPLETED
Start: 2019-06-28 | End: 2019-06-28

## 2019-06-28 RX ORDER — METOCLOPRAMIDE HYDROCHLORIDE 5 MG/ML
10 INJECTION INTRAMUSCULAR; INTRAVENOUS ONCE AS NEEDED
Status: DISCONTINUED | OUTPATIENT
Start: 2019-06-28 | End: 2019-06-28 | Stop reason: HOSPADM

## 2019-06-28 RX ORDER — TRIAMCINOLONE ACETONIDE 1 MG/G
CREAM TOPICAL ONCE
Status: COMPLETED | OUTPATIENT
Start: 2019-06-28 | End: 2019-06-28

## 2019-06-28 RX ADMIN — PHENYLEPHRINE HYDROCHLORIDE 100 MCG: 10 INJECTION INTRAVENOUS at 11:55

## 2019-06-28 RX ADMIN — FENTANYL CITRATE 50 MCG/HR: 50 INJECTION, SOLUTION INTRAMUSCULAR; INTRAVENOUS at 23:52

## 2019-06-28 RX ADMIN — METOCLOPRAMIDE 10 MG: 5 INJECTION, SOLUTION INTRAMUSCULAR; INTRAVENOUS at 18:36

## 2019-06-28 RX ADMIN — FENTANYL CITRATE 50 MCG: 50 INJECTION, SOLUTION INTRAMUSCULAR; INTRAVENOUS at 08:59

## 2019-06-28 RX ADMIN — DEXAMETHASONE SODIUM PHOSPHATE 10 MG: 10 INJECTION, SOLUTION INTRAMUSCULAR; INTRAVENOUS at 14:05

## 2019-06-28 RX ADMIN — EPHEDRINE SULFATE 2.5 MG: 50 INJECTION, SOLUTION INTRAVENOUS at 09:24

## 2019-06-28 RX ADMIN — SODIUM CHLORIDE, SODIUM GLUCONATE, SODIUM ACETATE, POTASSIUM CHLORIDE, MAGNESIUM CHLORIDE, SODIUM PHOSPHATE, DIBASIC, AND POTASSIUM PHOSPHATE 1000 ML: .53; .5; .37; .037; .03; .012; .00082 INJECTION, SOLUTION INTRAVENOUS at 19:26

## 2019-06-28 RX ADMIN — SODIUM CHLORIDE 75 ML/HR: 0.9 INJECTION, SOLUTION INTRAVENOUS at 17:24

## 2019-06-28 RX ADMIN — FENTANYL CITRATE 50 MCG: 50 INJECTION, SOLUTION INTRAMUSCULAR; INTRAVENOUS at 10:48

## 2019-06-28 RX ADMIN — ALBUMIN (HUMAN) 12.5 G: 12.5 SOLUTION INTRAVENOUS at 16:20

## 2019-06-28 RX ADMIN — PHENYLEPHRINE HYDROCHLORIDE 100 MCG: 10 INJECTION INTRAVENOUS at 12:18

## 2019-06-28 RX ADMIN — PROPOFOL 50 MG: 10 INJECTION, EMULSION INTRAVENOUS at 23:29

## 2019-06-28 RX ADMIN — PHENYLEPHRINE HYDROCHLORIDE 200 MCG: 10 INJECTION INTRAVENOUS at 22:55

## 2019-06-28 RX ADMIN — FENTANYL CITRATE 50 MCG: 50 INJECTION, SOLUTION INTRAMUSCULAR; INTRAVENOUS at 08:37

## 2019-06-28 RX ADMIN — LORAZEPAM 0.5 MG: 2 INJECTION INTRAMUSCULAR; INTRAVENOUS at 18:44

## 2019-06-28 RX ADMIN — PHENYLEPHRINE HYDROCHLORIDE 200 MCG: 10 INJECTION INTRAVENOUS at 23:14

## 2019-06-28 RX ADMIN — ROCURONIUM BROMIDE 50 MG: 10 INJECTION, SOLUTION INTRAVENOUS at 08:20

## 2019-06-28 RX ADMIN — FENTANYL CITRATE 50 MCG: 50 INJECTION INTRAMUSCULAR; INTRAVENOUS at 21:23

## 2019-06-28 RX ADMIN — ROCURONIUM BROMIDE 20 MG: 10 INJECTION, SOLUTION INTRAVENOUS at 12:53

## 2019-06-28 RX ADMIN — PHENYLEPHRINE HYDROCHLORIDE 200 MCG: 10 INJECTION INTRAVENOUS at 22:46

## 2019-06-28 RX ADMIN — CEFAZOLIN SODIUM 1000 MG: 1 SOLUTION INTRAVENOUS at 21:26

## 2019-06-28 RX ADMIN — ROCURONIUM BROMIDE 40 MG: 10 INJECTION, SOLUTION INTRAVENOUS at 22:54

## 2019-06-28 RX ADMIN — FENTANYL CITRATE 25 MCG: 50 INJECTION INTRAMUSCULAR; INTRAVENOUS at 20:01

## 2019-06-28 RX ADMIN — Medication 100 MG: at 22:43

## 2019-06-28 RX ADMIN — HYDROMORPHONE HYDROCHLORIDE 0.25 MG: 2 INJECTION, SOLUTION INTRAMUSCULAR; INTRAVENOUS; SUBCUTANEOUS at 13:48

## 2019-06-28 RX ADMIN — PHENYLEPHRINE HYDROCHLORIDE 50 MCG: 10 INJECTION INTRAVENOUS at 11:01

## 2019-06-28 RX ADMIN — PHENYLEPHRINE HYDROCHLORIDE 10 DROP: 1 SPRAY NASAL at 08:20

## 2019-06-28 RX ADMIN — PROPOFOL 150 MG: 10 INJECTION, EMULSION INTRAVENOUS at 08:20

## 2019-06-28 RX ADMIN — PHENYLEPHRINE HYDROCHLORIDE 100 MCG: 10 INJECTION INTRAVENOUS at 14:03

## 2019-06-28 RX ADMIN — HYDROMORPHONE HYDROCHLORIDE 0.5 MG: 2 INJECTION, SOLUTION INTRAMUSCULAR; INTRAVENOUS; SUBCUTANEOUS at 08:47

## 2019-06-28 RX ADMIN — SODIUM CHLORIDE, SODIUM GLUCONATE, SODIUM ACETATE, POTASSIUM CHLORIDE, MAGNESIUM CHLORIDE, SODIUM PHOSPHATE, DIBASIC, AND POTASSIUM PHOSPHATE 75 ML/HR: .53; .5; .37; .037; .03; .012; .00082 INJECTION, SOLUTION INTRAVENOUS at 19:53

## 2019-06-28 RX ADMIN — METOPROLOL TARTRATE 2.5 MG: 1 INJECTION, SOLUTION INTRAVENOUS at 08:59

## 2019-06-28 RX ADMIN — ALBUMIN (HUMAN): 12.5 SOLUTION INTRAVENOUS at 13:42

## 2019-06-28 RX ADMIN — ROCURONIUM BROMIDE 30 MG: 10 INJECTION, SOLUTION INTRAVENOUS at 09:07

## 2019-06-28 RX ADMIN — PROPOFOL 50 MG: 10 INJECTION, EMULSION INTRAVENOUS at 23:21

## 2019-06-28 RX ADMIN — ALBUTEROL SULFATE 2.5 MG: 2.5 SOLUTION RESPIRATORY (INHALATION) at 17:28

## 2019-06-28 RX ADMIN — SODIUM CHLORIDE: 0.9 INJECTION, SOLUTION INTRAVENOUS at 11:31

## 2019-06-28 RX ADMIN — ALBUMIN (HUMAN): 12.5 SOLUTION INTRAVENOUS at 12:25

## 2019-06-28 RX ADMIN — PHENYLEPHRINE HYDROCHLORIDE 100 MCG: 10 INJECTION INTRAVENOUS at 14:27

## 2019-06-28 RX ADMIN — CHLORHEXIDINE GLUCONATE 0.12% ORAL RINSE 30 ML: 1.2 LIQUID ORAL at 18:12

## 2019-06-28 RX ADMIN — MIDAZOLAM 1 MG: 1 INJECTION INTRAMUSCULAR; INTRAVENOUS at 15:33

## 2019-06-28 RX ADMIN — SUGAMMADEX 200 MG: 100 INJECTION, SOLUTION INTRAVENOUS at 15:05

## 2019-06-28 RX ADMIN — ROCURONIUM BROMIDE 10 MG: 10 INJECTION, SOLUTION INTRAVENOUS at 12:21

## 2019-06-28 RX ADMIN — CEFAZOLIN SODIUM 1000 MG: 2 SOLUTION INTRAVENOUS at 12:50

## 2019-06-28 RX ADMIN — ROCURONIUM BROMIDE 10 MG: 10 INJECTION, SOLUTION INTRAVENOUS at 11:55

## 2019-06-28 RX ADMIN — PHENYLEPHRINE HYDROCHLORIDE 200 MCG: 10 INJECTION INTRAVENOUS at 22:59

## 2019-06-28 RX ADMIN — PHENYLEPHRINE HYDROCHLORIDE 100 MCG: 10 INJECTION INTRAVENOUS at 12:32

## 2019-06-28 RX ADMIN — ALBUMIN (HUMAN) 12.5 G: 12.5 SOLUTION INTRAVENOUS at 16:35

## 2019-06-28 RX ADMIN — METOCLOPRAMIDE 10 MG: 5 INJECTION, SOLUTION INTRAMUSCULAR; INTRAVENOUS at 14:13

## 2019-06-28 RX ADMIN — CEFAZOLIN SODIUM 1000 MG: 2 SOLUTION INTRAVENOUS at 08:55

## 2019-06-28 RX ADMIN — SODIUM CHLORIDE: 0.9 INJECTION, SOLUTION INTRAVENOUS at 09:17

## 2019-06-28 RX ADMIN — PHENYLEPHRINE HYDROCHLORIDE 100 MCG: 10 INJECTION INTRAVENOUS at 13:24

## 2019-06-28 RX ADMIN — SODIUM CHLORIDE, SODIUM GLUCONATE, SODIUM ACETATE, POTASSIUM CHLORIDE, MAGNESIUM CHLORIDE, SODIUM PHOSPHATE, DIBASIC, AND POTASSIUM PHOSPHATE 1000 ML: .53; .5; .37; .037; .03; .012; .00082 INJECTION, SOLUTION INTRAVENOUS at 18:10

## 2019-06-28 RX ADMIN — SODIUM CHLORIDE: 0.9 INJECTION, SOLUTION INTRAVENOUS at 13:43

## 2019-06-28 RX ADMIN — ONDANSETRON 4 MG: 2 INJECTION INTRAMUSCULAR; INTRAVENOUS at 14:06

## 2019-06-28 RX ADMIN — FENTANYL CITRATE 25 MCG: 50 INJECTION, SOLUTION INTRAMUSCULAR; INTRAVENOUS at 08:47

## 2019-06-28 RX ADMIN — FENTANYL CITRATE 75 MCG: 50 INJECTION, SOLUTION INTRAMUSCULAR; INTRAVENOUS at 08:20

## 2019-06-28 RX ADMIN — GLYCOPYRROLATE 0.2 MG: 0.2 INJECTION, SOLUTION INTRAMUSCULAR; INTRAVENOUS at 08:29

## 2019-06-28 RX ADMIN — SODIUM CHLORIDE, SODIUM LACTATE, POTASSIUM CHLORIDE, AND CALCIUM CHLORIDE: .6; .31; .03; .02 INJECTION, SOLUTION INTRAVENOUS at 07:35

## 2019-06-28 RX ADMIN — MIDAZOLAM 1 MG: 1 INJECTION INTRAMUSCULAR; INTRAVENOUS at 15:32

## 2019-06-28 RX ADMIN — PROPOFOL 200 MG: 10 INJECTION, EMULSION INTRAVENOUS at 22:43

## 2019-06-28 RX ADMIN — SODIUM CHLORIDE, SODIUM LACTATE, POTASSIUM CHLORIDE, AND CALCIUM CHLORIDE: .6; .31; .03; .02 INJECTION, SOLUTION INTRAVENOUS at 09:02

## 2019-06-28 RX ADMIN — ONDANSETRON 4 MG: 2 INJECTION INTRAMUSCULAR; INTRAVENOUS at 08:03

## 2019-06-28 RX ADMIN — FENTANYL CITRATE 25 MCG: 50 INJECTION, SOLUTION INTRAMUSCULAR; INTRAVENOUS at 20:01

## 2019-06-28 RX ADMIN — PHENYLEPHRINE HYDROCHLORIDE 100 MCG: 10 INJECTION INTRAVENOUS at 13:16

## 2019-06-28 RX ADMIN — SODIUM CHLORIDE, SODIUM GLUCONATE, SODIUM ACETATE, POTASSIUM CHLORIDE, MAGNESIUM CHLORIDE, SODIUM PHOSPHATE, DIBASIC, AND POTASSIUM PHOSPHATE 1000 ML: .53; .5; .37; .037; .03; .012; .00082 INJECTION, SOLUTION INTRAVENOUS at 22:01

## 2019-06-28 RX ADMIN — MIDAZOLAM 2 MG: 1 INJECTION INTRAMUSCULAR; INTRAVENOUS at 08:03

## 2019-06-28 RX ADMIN — SODIUM CHLORIDE: 0.9 INJECTION, SOLUTION INTRAVENOUS at 22:32

## 2019-06-28 RX ADMIN — VANCOMYCIN HYDROCHLORIDE 750 MG: 750 INJECTION, SOLUTION INTRAVENOUS at 23:54

## 2019-06-28 RX ADMIN — DEXAMETHASONE SODIUM PHOSPHATE 10 MG: 10 INJECTION, SOLUTION INTRAMUSCULAR; INTRAVENOUS at 08:50

## 2019-06-28 RX ADMIN — PHENYLEPHRINE HYDROCHLORIDE 100 MCG: 10 INJECTION INTRAVENOUS at 11:14

## 2019-06-28 RX ADMIN — ONDANSETRON 4 MG: 2 INJECTION INTRAMUSCULAR; INTRAVENOUS at 18:49

## 2019-06-28 RX ADMIN — FENTANYL CITRATE 50 MCG: 50 INJECTION, SOLUTION INTRAMUSCULAR; INTRAVENOUS at 21:23

## 2019-06-28 RX ADMIN — SODIUM CHLORIDE, SODIUM LACTATE, POTASSIUM CHLORIDE, AND CALCIUM CHLORIDE: .6; .31; .03; .02 INJECTION, SOLUTION INTRAVENOUS at 15:10

## 2019-06-28 RX ADMIN — ROCURONIUM BROMIDE 20 MG: 10 INJECTION, SOLUTION INTRAVENOUS at 10:29

## 2019-06-28 RX ADMIN — PROPOFOL 10 MCG/KG/MIN: 10 INJECTION, EMULSION INTRAVENOUS at 23:53

## 2019-06-28 RX ADMIN — PHENYLEPHRINE HYDROCHLORIDE 100 MCG: 10 INJECTION INTRAVENOUS at 11:36

## 2019-06-28 RX ADMIN — LIDOCAINE HYDROCHLORIDE 50 MG: 10 INJECTION, SOLUTION INFILTRATION; PERINEURAL at 08:20

## 2019-06-28 RX ADMIN — LIDOCAINE HYDROCHLORIDE 50 MG: 10 INJECTION, SOLUTION INFILTRATION; PERINEURAL at 22:43

## 2019-06-28 RX ADMIN — HYDROMORPHONE HYDROCHLORIDE 0.5 MG: 2 INJECTION, SOLUTION INTRAMUSCULAR; INTRAVENOUS; SUBCUTANEOUS at 10:14

## 2019-06-28 RX ADMIN — PHENYLEPHRINE HYDROCHLORIDE 200 MCG: 10 INJECTION INTRAVENOUS at 23:09

## 2019-06-28 RX ADMIN — HYDROMORPHONE HYDROCHLORIDE 0.25 MG: 2 INJECTION, SOLUTION INTRAMUSCULAR; INTRAVENOUS; SUBCUTANEOUS at 13:17

## 2019-06-28 RX ADMIN — PHENYLEPHRINE HYDROCHLORIDE 100 MCG: 10 INJECTION INTRAVENOUS at 12:06

## 2019-06-28 RX ADMIN — PHENYLEPHRINE HYDROCHLORIDE 50 MCG: 10 INJECTION INTRAVENOUS at 10:54

## 2019-06-28 RX ADMIN — PHENYLEPHRINE HYDROCHLORIDE 100 MCG: 10 INJECTION INTRAVENOUS at 14:12

## 2019-06-28 NOTE — PROGRESS NOTES
Acceptance Note - [address] [facility] 20 y o  female MRN: [id number]  Unit/Bed#: ICU 13 Encounter: [id number]    Assessment: 21 yo female s/p maxillofacial surgery    Plan: Airway observation following facial surgery         Neuro: GCS 11 (E4 V1 not testable M6)                 CV: Tachycardial s p IV lopressor   No preexisting cardiac history                 HEENT: S/p [place] with maxillofacial fixation, chin and intranasal surgery   NPO except ice and syringe sips, no straw until cleared by OMFS   Dejuan-nasal suction with provided Yankauer   If needed, scissors provided for release of fixation device     Lung: Tachypnea   Saturating low 90s on nonrebreather   S/p jaw closure                 GI: No acute issues   NPO except for ice/syringe hydration                 FEN: Currently receiving 250cc albumin                 : No acute issues   No Gomez                    ID: No acute issues                 Heme:    Right radial arterial line   Hypotensive during the case                 Endo: No acute issues                            Msk/Skin: Sutures in bilateral cheeks   No other other deformities noted   Ambulate with assistance                 Disposition: ICU    Chief Complaint: Can not assess    HPI/24hr events: Patient is a 20year old female with a devleopmental skeletal deformity causing oral malocclusion without other sequela  She has been receiving orthodontic correction for 5 years and has elective surgery to correct her dental malocclusion  She is being admitted to critical care for airway observation overnight  Surgically, her operation went as expected with  cc and  cc  She received multiple crystalloid products intraoperatively and was evaluated in PACU with a nonrebreather mask saturating 90-96%    Physical Exam:   Constitutional:  Well-developed, well-nourished  Somnolent but arousable  Anxious appearing  HENT  Head:  Normocephalic, atraumatic   Bilateral sutures of parotid region  Mild facial edema  Eyes:  PERRLA, EOMI  Neck:  Normal ROM, supple, no tracheal deviation, no cervical LAD  CV:  Regular rate and regular rhythm  No murmurs, rubs, gallops appreciated  Pulmonary:  Equal chest rise, no tenderness to palpation, clear to auscultation in all lung fields  No wheezes, rales, or rhonchi or stridor  Abdominal:  Soft, bowel sounds positive, nondistended, nontender, no guarding or rebound noted  Musculoskeletal:  Spontaneous movement of all 4 extremities  No edema noted  Palpable pulses globally  Neuro:  GCS 11, cranial nerves intact  Unable to talk secondary to procedure  Skin:  Warm and dry, without rashes or erythema      Vitals:    19 1615 19 1630 19 1645 19 1702   BP: 91/50 (!) 82/49 (!) 86/53    Pulse: (!) 116 (!) 110 (!) 120 (!) 114   Resp: (!) 25 (!) 24 16 (!) 24   Temp:  98 9 °F (37 2 °C)     TempSrc:       SpO2: 91% 97% 99% (!) 89%   Weight:       Height:         Arterial Line BP: 84/46  Arterial Line MAP (mmHg): 60 mmHg    Temperature:   Temp (24hrs), Av 4 °F (36 9 °C), Min:96 5 °F (35 8 °C), Max:99 8 °F (37 7 °C)    Current: Temperature: 98 9 °F (37 2 °C)    Weights:   IBW: 54 7 kg    Body mass index is 20 43 kg/m²    Weight (last 2 days)     Date/Time   Weight    19 0656   54 (119)              Hemodynamic Monitoring:  N/A     Non-Invasive/Invasive Ventilation Settings:  Respiratory    Lab Data (Last 4 hours)    None         O2/Vent Data (Last 4 hours)    None              No results found for: PHART, UQZ3QRR, PO2ART, WOJ0AIG, G5YSWNOV, BEART, SOURCE  SpO2: SpO2: (!) 89 %      Intake and Outputs:  I/O       701 -  07 07 -  07 -  0700    I V  (mL/kg)   4000 (74 1)    IV Piggyback   500    Total Intake(mL/kg)   4500 (83 3)    Urine (mL/kg/hr)   225 (0 4)    Blood   350    Total Output   575    Net   +3925                 Nutrition:        Diet Orders   (From admission, onward)            Start     Ordered    06/28/19 1717  Diet NPO; Ice chips  Diet effective now     Comments:  Can have ice chips in lips, syringe feeds of water, no straw until cleared by OMFS   Question Answer Comment   Diet Type NPO    NPO Except: Ice chips    Special Instructions No straw    RD to adjust diet per protocol? No        06/28/19 1720          Labs:   Results from last 7 days   Lab Units 06/28/19  0739   WBC Thousand/uL 9 08   HEMOGLOBIN g/dL 13 7   HEMATOCRIT % 41 8   PLATELETS Thousands/uL 230      Results from last 7 days   Lab Units 06/28/19  0739   SODIUM mmol/L 140   POTASSIUM mmol/L 3 9   CHLORIDE mmol/L 106   CO2 mmol/L 28   BUN mg/dL 12   CREATININE mg/dL 0 84   CALCIUM mg/dL 9 3                      No results found for: TROPONINI    Imaging: None N/A    EKG: None    Micro:  No results found for: Freddy Yu, WOUNDCULT, SPUTUMCULTUR    Allergies: No Known Allergies    Medications:   Scheduled Meds:    Current Facility-Administered Medications:  albuterol       albuterol 2 5 mg Nebulization Q6H PRN Maye Fang MD   albuterol 2 5 mg Nebulization Q6H PRN Aundrea Bernard MD   lactated ringers 100 mL/hr Intravenous Continuous Aundrea Bernard MD   LORazepam 0 25 mg Intravenous Once Aundrea Bernard MD   sodium chloride 75 mL/hr Intravenous Continuous Aundrea Bernard MD     Continuous Infusions:    lactated ringers 100 mL/hr   sodium chloride 75 mL/hr     PRN Meds:    albuterol 2 5 mg Q6H PRN   albuterol 2 5 mg Q6H PRN       VTE Pharmacologic Prophylaxis: Reason for no pharmacologic prophylaxis Early ambulation  VTE Mechanical Prophylaxis: sequential compression device    Invasive lines and devices:   Invasive Devices     Peripheral Intravenous Line            Peripheral IV 06/28/19 Left Antecubital less than 1 day          Arterial Line            Arterial Line 06/28/19 Right Radial less than 1 day                   Code Status: Level 1 - Full Code     Portions of the record may have been created with voice recognition software  Occasional wrong word or "sound a like" substitutions may have occurred due to the inherent limitations of voice recognition software  Read the chart carefully and recognize, using context, where substitutions have occurred       Issac Camacho MD

## 2019-06-28 NOTE — INTERVAL H&P NOTE
H&P reviewed  After examining the patient I find no changes in the patients condition since the H&P had been written  Blood pressure 109/74, pulse (!) 110, temperature (!) 96 5 °F (35 8 °C), temperature source Oral, resp  rate 20, height 5' 4" (1 626 m), weight 54 kg (119 lb), SpO2 96 %

## 2019-06-28 NOTE — PLAN OF CARE
Problem: Prexisting or High Potential for Compromised Skin Integrity  Goal: Skin integrity is maintained or improved  Description  INTERVENTIONS:  - Identify patients at risk for skin breakdown  - Assess and monitor skin integrity  - Assess and monitor nutrition and hydration status  - Monitor labs (i e  albumin)  - Assess for incontinence   - Turn and reposition patient  - Assist with mobility/ambulation  - Relieve pressure over bony prominences  - Avoid friction and shearing  - Provide appropriate hygiene as needed including keeping skin clean and dry  - Evaluate need for skin moisturizer/barrier cream  - Collaborate with interdisciplinary team (i e  Nutrition, Rehabilitation, etc )   - Patient/family teaching  Outcome: Progressing     Problem: Potential for Falls  Goal: Patient will remain free of falls  Description  INTERVENTIONS:  - Assess patient frequently for physical needs  -  Identify cognitive and physical deficits and behaviors that affect risk of falls    -  [place] fall precautions as indicated by assessment   - Educate patient/family on patient safety including physical limitations  - Instruct patient to call for assistance with activity based on assessment  - Modify environment to reduce risk of injury  - Consider OT/PT consult to assist with strengthening/mobility  Outcome: Progressing     Problem: PAIN - ADULT  Goal: Verbalizes/displays adequate comfort level or baseline comfort level  Description  Interventions:  - Encourage patient to monitor pain and request assistance  - Assess pain using appropriate pain scale  - Administer analgesics based on type and severity of pain and evaluate response  - Implement non-pharmacological measures as appropriate and evaluate response  - Consider cultural and social influences on pain and pain management  - Notify physician/advanced practitioner if interventions unsuccessful or patient reports new pain  Outcome: Progressing     Problem: INFECTION - ADULT  Goal: Absence or prevention of progression during hospitalization  Description  INTERVENTIONS:  - Assess and monitor for signs and symptoms of infection  - Monitor lab/diagnostic results  - Monitor all insertion sites, i e  indwelling lines, tubes, and drains  - Monitor endotracheal (as able) and nasal secretions for changes in amount and color  - [place] appropriate cooling/warming therapies per order  - Administer medications as ordered  - Instruct and encourage patient and family to use good hand hygiene technique  - Identify and instruct in appropriate isolation precautions for identified infection/condition  Outcome: Progressing  Goal: Absence of fever/infection during neutropenic period  Description  INTERVENTIONS:  - Monitor WBC  - Implement neutropenic guidelines  Outcome: Progressing     Problem: SAFETY ADULT  Goal: Patient will remain free of falls  Description  INTERVENTIONS:  - Assess patient frequently for physical needs  -  Identify cognitive and physical deficits and behaviors that affect risk of falls    -  [place] fall precautions as indicated by assessment   - Educate patient/family on patient safety including physical limitations  - Instruct patient to call for assistance with activity based on assessment  - Modify environment to reduce risk of injury  - Consider OT/PT consult to assist with strengthening/mobility  Outcome: Progressing  Goal: Maintain or return to baseline ADL function  Description  INTERVENTIONS:  -  Assess patient's ability to carry out ADLs; assess patient's baseline for ADL function and identify physical deficits which impact ability to perform ADLs (bathing, care of mouth/teeth, toileting, grooming, dressing, etc )  - Assess/evaluate cause of self-care deficits   - Assess range of motion  - Assess patient's mobility; develop plan if impaired  - Assess patient's need for assistive devices and provide as appropriate  - Encourage maximum independence but intervene and supervise when necessary  ¯ Involve family in performance of ADLs  ¯ Assess for home care needs following discharge   ¯ Request OT consult to assist with ADL evaluation and planning for discharge  ¯ Provide patient education as appropriate  Outcome: Progressing  Goal: Maintain or return mobility status to optimal level  Description  INTERVENTIONS:  - Assess patient's baseline mobility status (ambulation, transfers, stairs, etc )    - Identify cognitive and physical deficits and behaviors that affect mobility  - Identify mobility aids required to assist with transfers and/or ambulation (gait belt, sit-to-stand, lift, walker, cane, etc )  - [place] fall precautions as indicated by assessment  - Record patient progress and toleration of activity level on Mobility SBAR; progress patient to next Phase/Stage  - Instruct patient to call for assistance with activity based on assessment  - Request Rehabilitation consult to assist with strengthening/weightbearing, etc   Outcome: Progressing     Problem: DISCHARGE PLANNING  Goal: Discharge to home or other facility with appropriate resources  Description  INTERVENTIONS:  - Identify barriers to discharge w/patient and caregiver  - Arrange for needed discharge resources and transportation as appropriate  - Identify discharge learning needs (meds, wound care, etc )  - Arrange for interpretive services to assist at discharge as needed  - Refer to Case Management Department for coordinating discharge planning if the patient needs post-hospital services based on physician/advanced practitioner order or complex needs related to functional status, cognitive ability, or social support system  Outcome: Progressing     Problem: Knowledge Deficit  Goal: Patient/family/caregiver demonstrates understanding of disease process, treatment plan, medications, and discharge instructions  Description  Complete learning assessment and assess knowledge base    Interventions:  - Provide teaching at level of understanding  - Provide teaching via preferred learning methods  Outcome: Progressing     Problem: COPING  Goal: Will report anxiety at manageable levels  Description  INTERVENTIONS:  - Administer medication as ordered  - Teach and encourage coping skills  - Provide emotional support  - Assess patient/family for anxiety and ability to cope  Outcome: Progressing

## 2019-06-28 NOTE — ANESTHESIA PREPROCEDURE EVALUATION
Review of Systems/Medical History  Patient summary reviewed  Chart reviewed      Cardiovascular  Negative cardio ROS    Pulmonary  Negative pulmonary ROS        GI/Hepatic  Negative GI/hepatic ROS          Negative  ROS        Endo/Other  Negative endo/other ROS      GYN  Negative gynecology ROS          Hematology  Negative hematology ROS      Musculoskeletal  Negative musculoskeletal ROS        Neurology  Negative neurology ROS      Psychology   Negative psychology ROS              Physical Exam    Airway    Mallampati score: II  TM Distance: >3 FB  Neck ROM: full     Dental   Comment: Braces upper and lower, No notable dental hx     Cardiovascular  Comment: Negative ROS, Rhythm: regular, Rate: normal, Cardiovascular exam normal    Pulmonary  Pulmonary exam normal Breath sounds clear to auscultation,     Other Findings  BHCG negative      Anesthesia Plan  ASA Score- 1     Anesthesia Type- general with ASA Monitors  Additional Monitors:   Airway Plan: NTT  Plan Factors-    Induction- intravenous  Postoperative Plan- Plan for postoperative opioid use  Informed Consent- Anesthetic plan and risks discussed with patient and mother  I personally reviewed this patient with the CRNA  Discussed and agreed on the Anesthesia Plan with the CRNA  Mi Underwood

## 2019-06-28 NOTE — ANESTHESIA PROCEDURE NOTES
Arterial Line Insertion  Performed by: Hero Burciaga CRNA  Authorized by: Claudia Martinez MD   Consent: Verbal consent obtained  Written consent obtained  Risks and benefits: risks, benefits and alternatives were discussed  Consent given by: patient and parent  Patient understanding: patient states understanding of the procedure being performed  Patient consent: the patient's understanding of the procedure matches consent given  Procedure consent: procedure consent matches procedure scheduled  Relevant documents: relevant documents present and verified  Test results: test results available and properly labeled  Site marked: the operative site was not marked  Radiology Images: No Radiology Images displayed or report reviewed  Required items: required blood products, implants, devices, and special equipment available  Patient identity confirmed: verbally with patient  Time out: Immediately prior to procedure a "time out" was called to verify the correct patient, procedure, equipment, support staff and site/side marked as required  Preparation: Patient was prepped and draped in the usual sterile fashion  Indications: multiple ABGs and hemodynamic monitoring  Orientation:  Right  Location: radial artery  Sedation:  Patient sedated: yes  Sedatives: propofol  Analgesia: fentanyl  Sedation start date/time: 6/28/2019 8:15 AM  Vitals: Vital signs were monitored during sedation      Procedure Details:  Needle gauge: 20  Seldinger technique: Seldinger technique used  Number of attempts: 2    Post-procedure:  Post-procedure: chlorhexidine patch applied and dressing applied  Waveform: good waveform and waveform confirmed  Patient tolerance: Patient tolerated the procedure well with no immediate complications

## 2019-06-28 NOTE — H&P
Subject: 21 y/o female has presented for elective maxillofacial surgery at [facility]  CC: My jaws and teeth do not come together properly   HPI: Patient has developmental deformity of the jaws, he has been treated with orthodontic dental braces for five years in preparation for jaw surgery  Surgical orthodontic hardware has been placed by treating orthodontist     Medical Hx: denies  Medications: denies  Allergies: NKDA  Surgical Hx: denies  Social Hx: none    Review of systems  Constitutional: No fevers, no chills, no night sweats, no weight loss  Eye: No change of vision, no eye pain, No visual problems, no diplopia, no blurry vision  ENT: No ear pain, no nasal congestion, no sore throat  +occasional nasal obstruction  Mouth: +dental malocclusion, +difficulty chewing food  Respiratory: No shortness of breath, no cough, + occasional nasal obstruction    Cardiovascular: No Chest pain, no pressure, no palpitations, no syncope, no loss of consciousness  Gastrointestinal: no change in bowel habits, No nausea, no vomiting, no diarrhea, no constipation, no melena, no anorexia, + occasional reflux  Genitourinary: No hematuria, no nocturia, no discharge, no dysuria, no incontinence, no change in urinary frequency, no urinary retention  Hema/Lymph: No bruising tendency, no swollen lymph glands  Endocrine: No increased thirst, no change in appetite, no heat or cold intolerance  Musculoskeletal: No back pain, no neck pain, no joint pain, no muscle pain, no swelling, no change in range of motion  Integumentary: No rash, no pruritus, no abrasions  Neurologic: no headache, no paresthesia, no limb weakness, Alert & oriented X 3  Psychiatric: no  anxiety, no depression, no suicidal ideation    Physical Exam  General: NAD, well nourished, well developed  Eyes: PERRLA/EOMI, No ecchymosis, No eye pain, No diplopia, No change of vision  Nose: No nasal congestion, no epistaxis, patent nasal airway  Mouth: +dental malocclusion, +U/L orthodontic hardware in place  No sign of infection  Neck: FROM, no lymphadenopathy  Respiratory: CTA B/L, no wheezing, no cough  Cardiovascular: RRR, no murmurs  Abdomen: soft, NT/ND  +BS  Extremities: FROMx 4, no peripheral edema      Outside Consults  PCP 6/19/2019 : cleared for surgery    VS:  109/74     Assessment  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, with malocclusion and recurrent nasal obstruction, for orthognathic surgery    Plan  1  Maintain NPO, to OR when called  2  Admit Post Op for airway monitoring, pain management, wound care, IVF  CVS: hemodynamic monitoring and support as needed  Respiratory: face tent to face, O2 saturation monitoring, support as needed  GI: GI prophylaxis till able for PO intake  Infection: antibiotic prophylaxis for 4 days post-op  Pain management: IV tylenol, IV morphine PRN  Nutrition: IVF   encourage full liquid PO diet with Marcel Syringe  Oral Care: chlorexidine rinse tid  DVT prophylaxis: Intermittent compression device  PONV prophylaxis: zofran/reglan PRN  Other: HOB 30 degrees, Ice packs to face, tonsillar suction at bedside, scissors for elastic  suture scissors (for cutting intraoral elastics in need) available at bedside

## 2019-06-28 NOTE — ANESTHESIA POSTPROCEDURE EVALUATION
Post-Op Assessment Note    CV Status:  Stable  Pain scale: patient awake to stimuli not verbalizing yet      Pain management: adequate     Mental Status:  Alert and awake   Hydration Status:  Euvolemic   PONV Controlled:  Controlled   Airway Patency:  Patent   Post Op Vitals Reviewed: Yes      Staff: CRNA           BP   93/43   Temp     Pulse 112   Resp   15   SpO2   92

## 2019-06-28 NOTE — PROGRESS NOTES
OMS Progress Note  Pt is a 19 y/o F with developmental dentofacial deformity, s/p orthognathic jaw surgery involving the maxilla, mandible, chin, septoplasty and inferior turbinate reduction  No intraoperative complications  No intraoperative blood products were required  She was admitted to SICU post-op for airway observation  She is comfortable, AAOx3, with mother and nephews at bedside  On non rebreather mask  Had albuterol treatment  P/E  H: normocephalic  E: PERRLA EOMI B/L  Moderate midface edema  E: hearing intact  N: patent airway  dried blood to nostrils  no active bleeding  Mouth: MMF with interocclusal dental rubber bands  Plastic interocclusal splint is attached to maxilla  Good intercuspidation of teeth  Sutures to upper and lower vestibule intact and hemostatic  Moderate lip edema  Lips competent  Neck: nylon sutures to bilateral skin incision sites to the mandible body area are intact and hemostatic  Moderate swelling of the lower face  : calderon with clear urine production        Recommendations:    Suture scissors at bedside (to cut the rubber bands if airway embarrassment)  O2 sat monitoring  Humidified O2 face tent to face PRN  CVS monitoring: VS q2h  CVS support as needed  IV fluids: NS  Ancef 1g q8h  Pain management: Morphine 2mg q2h prn  Post-op anxiety: Ativan 1mg q12 prn  Avoid over sedation  Decadron 8mg IV q8h x 3 for facial edema prevention  Nausea prevention: Zofran 4mg q4h PRN  Reglan 5mg PRN    Nasal saline rinse (spray) q6h  Dre suction at bedside  HOB 30 degrees  Ice packs to face  Chlorhexidine 0 12% oral rinse 5x/day  Toothbrush at bedside  Full liquid diet  DVT prophylaxis: SCDs

## 2019-06-28 NOTE — RESPIRATORY THERAPY NOTE
RT Protocol Note  Henna Price 20 y o  female MRN: [id number]  Unit/Bed#: ICU 13 Encounter: [id number]    Assessment    Principal Problem:    Skeletal deformity of jaw      Home Pulmonary Medications:  albuterol       History reviewed  No pertinent past medical history  Social History     Socioeconomic History    Marital status: Unknown     Spouse name: None    Number of children: None    Years of education: None    Highest education level: None   Occupational History    None   Social Needs    Financial resource strain: None    Food insecurity:     Worry: None     Inability: None    Transportation needs:     Medical: None     Non-medical: None   Tobacco Use    Smoking status: Never Smoker    Smokeless tobacco: Never Used   Substance and Sexual Activity    Alcohol use: Never     Frequency: Never    Drug use: Never    Sexual activity: None   Lifestyle    Physical activity:     Days per week: None     Minutes per session: None    Stress: None   Relationships    Social connections:     Talks on phone: None     Gets together: None     Attends Spiritism service: None     Active member of club or organization: None     Attends meetings of clubs or organizations: None     Relationship status: None    Intimate partner violence:     Fear of current or ex partner: None     Emotionally abused: None     Physically abused: None     Forced sexual activity: None   Other Topics Concern    None   Social History Narrative    None       Subjective         Objective    Physical Exam:   Assessment Type: Pre-treatment  General Appearance: Alert, Awake, Drowsy  Respiratory Pattern: Normal  Chest Assessment: Chest expansion symmetrical  Bilateral Breath Sounds: Clear  Cough: Non-productive  O2 Device: non re- breather    Vitals:  Blood pressure (!) 86/53, pulse (!) 114, temperature 98 9 °F (37 2 °C), resp  rate (!) 24, height 5' 4" (1 626 m), weight 54 kg (119 lb), SpO2 100 %            Imaging and other studies: I have personally reviewed pertinent reports  O2 Device: non re- breather     Plan    Respiratory Plan: No distress/Pulmonary history        Resp Comments: pt evaluated per protocol  pt from OR for jaw surgery  pt has history of asthma and takes prn nebs at home   pt bs= clear and will continue nebs prn  no other therapy indicated at this time

## 2019-06-28 NOTE — OP NOTE
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]     * Sleep disorder [G47 9]    Procedure(s) (LRB):  GENIOPLASTY, LEFORTE I, BILATERAL SAGITAL SPLIT RAMUS OSTEOTOMY, SEPTOPLASTY, INFERIOR TURBINATE REDUCTION (Bilateral)    Specimen(s):  * No specimens in log *    Estimated Blood Loss:   350 mL    Drains:  [REMOVED] Urethral Catheter Temperature probe;Non-latex 16 Fr  (Removed)   Number of days: 0       Anesthesia Type:   General    Operative Indications:  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]  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]  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]  Septum deviation  Reduction of bilateral inferior nasal turbinates    Operative Findings:  Malocclusion  Deviated septum  Hypertrophy of inferior nasal turbinates    Complications:   None    Procedure and Technique:  Patient was brought to the operating room and placed in supine position on the operating room table  After satisfactory anesthesia via naso-tracheal intubation, the naso-tracheal tube was secured to the septum with 0 Ethibond suture  The anesthesiologist placed the appropriate monitoring equipment to deliver hypotensive anesthesia  Intravenous prophylactic antibiotic was given  The patients eyes were protected with corneal shields  The patients head was placed in the White head ring in neural neck position  The head and neck were prepped in the usual sterile fashion  The mouth was suctioned  A moistened throat pack was placed  The mouth was cleansed with chlorhexidine 0 12% strength solution, and suctioned   Lidocaine 1% with 1:100,000 epinephrine was injected in vestibule of the maxilla and of the mandible  Attention was turned to the right lateral mandible  A vestibular incision was made adjacent to the second molar tooth and carried down to bone  Subperiosteal dissection exposed the region of the ramus and body for sagittal split osteotomies  Retractors were placed  The cortical cuts were marked out  The cortical cuts were completed with a reciprocating saw  The wound was irrigated and suctioned, retractors were removed, packs were placed  Attention was turned to the left mandible and exactly the same procedure was carried out without difficulty  Attention was turned to the maxilla and midface  The anterior vertical dimension was measured from the medial canthus to the mid-maxillary central incisor on each side and recorded  Attention was turned intraorally  A vestibular incision was made from zygomatic buttress to zygomatic buttress in the depth of the vestibule and carried down to bone  Subperiosteal dissection exposed the face of the anterior maxilla, the anterior nasal spine, floor of the nose, back to the pterygomaxillary suture region  Specialized retractors were placed  The maxilla was marked out for horizontal LeFort I osteotomy  First, the bilateral interdental corticotomies were performed with small oscillating saw, between the lateral and canine tooth  Then, with a reciprocating saw, the Lefort 1 osteotomies were completed: through the lateral, anterior and medial maxillary sinus walls on each side  With the anterior nasal spine chisel, the septum was  from the maxilla  With the peterygomaxillary chisel, each pterygomaxillary suture was   With finger pressure, the maxilla was downfractured and disimpacted  Attention was directed to the deviated nasal septum  With a sharp periosteal elevator, the bony and cartilaginous inferior aspects of the nasal septum were exposed   The buckled potions were visualized and were resected with a double action rongeur  Attention was turned to the hypertrophic inferior turbinates  Exposure was achieved by making an incision parallel and inferior to each inferior turbinate through the nasal mucosa  The inferior aspect of each inferior turbinate were clamped with a Kocher clamp and then resected with the Casillas scissors  The raw surfaces were cauterized  The nasal mucosa was then closed with 3-0 Vicryl interrupted sutures  Next, the segmentation of the maxilla was completed with a reciprocating saw, in parasaggital fashion for the hard palate, towards the premaxilla and connecting with the inderdental corticotomies  The segments were mobilized, there was no palatal mucosal tear  The prefabricated intermediate splint was brought to the table, secured to the maxillary dentition and then the jaws were wired together through the intermediary splint  The condyles were seated in the fossa  The desired horizontal and vertical dimensions were achieved for the maxilla  The maxilla was fixated in the desired position using Synthes implants: a titanium bone plate was adapted for each zygomatic buttress and each pyriform aperture and secured with titanium screws  The jaws were unwired  The occlusion was checked and found to be as planned for  The intermediate splint was removed  The final splint was brought to the table and wired to the maxillary dentition  Attention was turned to the chin region  The vestibular incision was made with a 15 blade in the depth if of the vestibule from canine to canine tooth and carried down to bone  Subperiosteal dissection exposed the face of the chin  Chin retractors were placed  Care was taken to avoid dissection at or above the mental nerve on each side  The chin was marked out for an oblique osteotomy below the roots of the teeth and below the mental nerve on each side  The osteotomy was completed with the oscillating and reciprocating saws   The chin was then advanced and vertically lengthened a small amount  Stabilization of the chin in its new position was achieved with titanium plates and screws  Attention was turned to the right lateral mandible  Packs were removed  Retractors were placed  With fine and Obwegeser chisels, the sagittal split osteotomies were completed  The neurovascular bundle remained intact  Attention was turned to the contralateral side and the exact same procedure was carried out  The neurovascular bundle remained again intact  The distal mandible was then wired to the maxilla through the final splint  Attention was turned to the right lateral mandible  A small amount of bone was removed on the distal aspect of the proximal bone segment until there was good bone to bone approximation  Once this was achieved, the transbuccal trocar system was introduced  Three titanium screws were placed across the osteotomy site  Attention was turned to the left lateral mandible and the exact same procedure was carried out  The jaws were unwired  The occlusion was checked and found to be as planned  The final splint was left in place  All wound were irrigated and suctioned  The maxillary wound was closed with 3-0 Chromic gut continuous locking  The mandibular wound were closed with 3-0 Vicryl interrupted  The chin wound was closed in two layers with Vicryl 3-0 interrupted  The skin wounds were closed with 5-0 nylon  The mouth was irrigated and suctioned  The throat pack was removed  An orogastric tube was passed and removed  Inter occlusal elastics were applied to the maxillary and mandibular orthodontic appliances  The eye shield were removed and the eyes were rinsed with BSS solution  The suture from the septum of the nose was removed  The patient remained cardiovasculary stable throughout the procedure, was extubated in the OR and moved to recovery room        I was present for the entire procedure, A co-surgeon was required because of skills and techniques relevant to speciality and A physician assistant was required during the procedure for retraction tissue handling,dissection and suturing    Patient Disposition:  PACU     SIGNATURE: Willette Kehr, DMD  DATE: June 28, 2019  TIME: 4:32 PM

## 2019-06-29 ENCOUNTER — APPOINTMENT (INPATIENT)
Dept: NON INVASIVE DIAGNOSTICS | Facility: HOSPITAL | Age: 19
DRG: 089 | End: 2019-06-29
Payer: COMMERCIAL

## 2019-06-29 ENCOUNTER — APPOINTMENT (INPATIENT)
Dept: RADIOLOGY | Facility: HOSPITAL | Age: 19
DRG: 089 | End: 2019-06-29
Payer: COMMERCIAL

## 2019-06-29 PROBLEM — E87.2 LACTIC ACIDOSIS: Status: ACTIVE | Noted: 2019-06-29

## 2019-06-29 PROBLEM — D62 ACUTE BLOOD LOSS ANEMIA: Status: ACTIVE | Noted: 2019-06-29

## 2019-06-29 PROBLEM — J96.00 ACUTE RESPIRATORY FAILURE (HCC): Status: ACTIVE | Noted: 2019-06-29

## 2019-06-29 PROBLEM — J80 ARDS (ADULT RESPIRATORY DISTRESS SYNDROME) (HCC): Status: ACTIVE | Noted: 2019-06-29

## 2019-06-29 PROBLEM — D72.829 LEUKOCYTOSIS: Status: ACTIVE | Noted: 2019-06-29

## 2019-06-29 PROBLEM — E87.2 METABOLIC ACIDOSIS: Status: ACTIVE | Noted: 2019-06-29

## 2019-06-29 LAB
ANION GAP SERPL CALCULATED.3IONS-SCNC: 5 MMOL/L (ref 4–13)
ARTERIAL PATENCY WRIST A: YES
BASE EXCESS BLDA CALC-SCNC: -3.4 MMOL/L
BASE EXCESS BLDA CALC-SCNC: -3.5 MMOL/L
BASE EXCESS BLDA CALC-SCNC: -4.5 MMOL/L
BASE EXCESS BLDA CALC-SCNC: -4.8 MMOL/L
BASE EXCESS BLDA CALC-SCNC: -7.5 MMOL/L
BASE EXCESS BLDA CALC-SCNC: -8 MMOL/L (ref -2–3)
BASOPHILS # BLD AUTO: 0.01 THOUSANDS/ΜL (ref 0–0.1)
BASOPHILS # BLD AUTO: 0.02 THOUSANDS/ΜL (ref 0–0.1)
BASOPHILS NFR BLD AUTO: 0 % (ref 0–1)
BASOPHILS NFR BLD AUTO: 0 % (ref 0–1)
BODY TEMPERATURE: 96.8 DEGREES FEHRENHEIT
BODY TEMPERATURE: 99.7 DEGREES FEHRENHEIT
BODY TEMPERATURE: 99.9 DEGREES FEHRENHEIT
BUN SERPL-MCNC: 12 MG/DL (ref 5–25)
CA-I BLD-SCNC: 1.06 MMOL/L (ref 1.12–1.32)
CALCIUM SERPL-MCNC: 7.3 MG/DL (ref 8.3–10.1)
CHLORIDE SERPL-SCNC: 112 MMOL/L (ref 100–108)
CO2 SERPL-SCNC: 23 MMOL/L (ref 21–32)
CREAT SERPL-MCNC: 0.69 MG/DL (ref 0.6–1.3)
EOSINOPHIL # BLD AUTO: 0 THOUSAND/ΜL (ref 0–0.61)
EOSINOPHIL # BLD AUTO: 0 THOUSAND/ΜL (ref 0–0.61)
EOSINOPHIL NFR BLD AUTO: 0 % (ref 0–6)
EOSINOPHIL NFR BLD AUTO: 0 % (ref 0–6)
ERYTHROCYTE [DISTWIDTH] IN BLOOD BY AUTOMATED COUNT: 13.2 % (ref 11.6–15.1)
ERYTHROCYTE [DISTWIDTH] IN BLOOD BY AUTOMATED COUNT: 13.3 % (ref 11.6–15.1)
GFR SERPL CREATININE-BSD FRML MDRD: 126 ML/MIN/1.73SQ M
GLUCOSE SERPL-MCNC: 136 MG/DL (ref 65–140)
GLUCOSE SERPL-MCNC: 157 MG/DL (ref 65–140)
HCO3 BLDA-SCNC: 19.1 MMOL/L (ref 22–28)
HCO3 BLDA-SCNC: 20.5 MMOL/L (ref 22–28)
HCO3 BLDA-SCNC: 20.8 MMOL/L (ref 22–28)
HCO3 BLDA-SCNC: 21.1 MMOL/L (ref 22–28)
HCO3 BLDA-SCNC: 22.1 MMOL/L (ref 22–28)
HCO3 BLDA-SCNC: 22.6 MMOL/L (ref 22–28)
HCT VFR BLD AUTO: 31.9 % (ref 34.8–46.1)
HCT VFR BLD AUTO: 32.3 % (ref 34.8–46.1)
HCT VFR BLD CALC: 24 % (ref 34.8–46.1)
HGB BLD-MCNC: 10.5 G/DL (ref 11.5–15.4)
HGB BLD-MCNC: 10.5 G/DL (ref 11.5–15.4)
HGB BLDA-MCNC: 8.2 G/DL (ref 11.5–15.4)
HOROWITZ INDEX BLDA+IHG-RTO: 100 MM[HG]
HOROWITZ INDEX BLDA+IHG-RTO: 100 MM[HG]
HOROWITZ INDEX BLDA+IHG-RTO: 50 MM[HG]
IMM GRANULOCYTES # BLD AUTO: 0.04 THOUSAND/UL (ref 0–0.2)
IMM GRANULOCYTES # BLD AUTO: 0.05 THOUSAND/UL (ref 0–0.2)
IMM GRANULOCYTES NFR BLD AUTO: 0 % (ref 0–2)
IMM GRANULOCYTES NFR BLD AUTO: 0 % (ref 0–2)
L PNEUMO1 AG UR QL IA.RAPID: NEGATIVE
LACTATE SERPL-SCNC: 1.5 MMOL/L (ref 0.5–2)
LACTATE SERPL-SCNC: 4.3 MMOL/L (ref 0.5–2)
LYMPHOCYTES # BLD AUTO: 0.67 THOUSANDS/ΜL (ref 0.6–4.47)
LYMPHOCYTES # BLD AUTO: 1.07 THOUSANDS/ΜL (ref 0.6–4.47)
LYMPHOCYTES NFR BLD AUTO: 5 % (ref 14–44)
LYMPHOCYTES NFR BLD AUTO: 7 % (ref 14–44)
MCH RBC QN AUTO: 29.8 PG (ref 26.8–34.3)
MCH RBC QN AUTO: 30.6 PG (ref 26.8–34.3)
MCHC RBC AUTO-ENTMCNC: 32.5 G/DL (ref 31.4–37.4)
MCHC RBC AUTO-ENTMCNC: 32.9 G/DL (ref 31.4–37.4)
MCV RBC AUTO: 92 FL (ref 82–98)
MCV RBC AUTO: 93 FL (ref 82–98)
MONOCYTES # BLD AUTO: 0.93 THOUSAND/ΜL (ref 0.17–1.22)
MONOCYTES # BLD AUTO: 1.61 THOUSAND/ΜL (ref 0.17–1.22)
MONOCYTES NFR BLD AUTO: 11 % (ref 4–12)
MONOCYTES NFR BLD AUTO: 6 % (ref 4–12)
NEUTROPHILS # BLD AUTO: 11.88 THOUSANDS/ΜL (ref 1.85–7.62)
NEUTROPHILS # BLD AUTO: 13 THOUSANDS/ΜL (ref 1.85–7.62)
NEUTS SEG NFR BLD AUTO: 82 % (ref 43–75)
NEUTS SEG NFR BLD AUTO: 89 % (ref 43–75)
NRBC BLD AUTO-RTO: 0 /100 WBCS
NRBC BLD AUTO-RTO: 0 /100 WBCS
O2 CT BLDA-SCNC: 14.2 ML/DL (ref 95–100)
O2 CT BLDA-SCNC: 15.1 ML/DL (ref 16–23)
O2 CT BLDA-SCNC: 15.9 ML/DL (ref 95–100)
O2 CT BLDA-SCNC: 16 ML/DL (ref 16–23)
O2 CT BLDA-SCNC: 16.2 ML/DL (ref 16–23)
OXYHGB MFR BLDA: 93.2 % (ref 94–97)
OXYHGB MFR BLDA: 93.6 % (ref 94–97)
OXYHGB MFR BLDA: 98.3 % (ref 94–97)
OXYHGB MFR BLDA: 98.4 % (ref 94–97)
OXYHGB MFR BLDA: 98.5 % (ref 94–97)
PCO2 BLD: 22 MMOL/L (ref 21–32)
PCO2 BLD: 55.6 MM HG (ref 36–44)
PCO2 BLDA: 40.5 MM HG (ref 36–44)
PCO2 BLDA: 40.9 MM HG (ref 36–44)
PCO2 BLDA: 41.6 MM HG (ref 36–44)
PCO2 BLDA: 43 MM HG (ref 36–44)
PCO2 BLDA: 44.8 MM HG (ref 36–44)
PCO2 TEMP ADJ BLDA: 41.8 MM HG (ref 36–44)
PEEP RESPIRATORY: 10 CM[H2O]
PEEP RESPIRATORY: 10 CM[H2O]
PEEP RESPIRATORY: 8 CM[H2O]
PH BLD: 7.17 [PH] (ref 7.35–7.45)
PH BLD: 7.32 [PH] (ref 7.35–7.45)
PH BLDA: 7.27 [PH] (ref 7.35–7.45)
PH BLDA: 7.32 [PH] (ref 7.35–7.45)
PH BLDA: 7.33 [PH] (ref 7.35–7.45)
PH BLDA: 7.33 [PH] (ref 7.35–7.45)
PH BLDA: 7.34 [PH] (ref 7.35–7.45)
PLATELET # BLD AUTO: 140 THOUSANDS/UL (ref 149–390)
PLATELET # BLD AUTO: 142 THOUSANDS/UL (ref 149–390)
PMV BLD AUTO: 10.3 FL (ref 8.9–12.7)
PMV BLD AUTO: 10.4 FL (ref 8.9–12.7)
PO2 BLD: 157.5 MM HG (ref 75–129)
PO2 BLD: 59 MM HG (ref 75–129)
PO2 BLDA: 153.3 MM HG (ref 75–129)
PO2 BLDA: 170.2 MM HG (ref 75–129)
PO2 BLDA: 190.4 MM HG (ref 75–129)
PO2 BLDA: 72.8 MM HG (ref 75–129)
PO2 BLDA: 74.1 MM HG (ref 75–129)
POTASSIUM BLD-SCNC: 4.4 MMOL/L (ref 3.5–5.3)
POTASSIUM SERPL-SCNC: 4.6 MMOL/L (ref 3.5–5.3)
PROCALCITONIN SERPL-MCNC: 1.71 NG/ML
RBC # BLD AUTO: 3.43 MILLION/UL (ref 3.81–5.12)
RBC # BLD AUTO: 3.52 MILLION/UL (ref 3.81–5.12)
S PNEUM AG UR QL: NEGATIVE
SAO2 % BLD FROM PO2: 82 % (ref 95–98)
SODIUM BLD-SCNC: 140 MMOL/L (ref 136–145)
SODIUM SERPL-SCNC: 140 MMOL/L (ref 136–145)
SPECIMEN SOURCE: ABNORMAL
VENT AC: 14
VENT- AC: AC
VT SETTING VENT: 330 ML
VT SETTING VENT: 400 ML
VT SETTING VENT: 400 ML
WBC # BLD AUTO: 14.63 THOUSAND/UL (ref 4.31–10.16)
WBC # BLD AUTO: 14.65 THOUSAND/UL (ref 4.31–10.16)

## 2019-06-29 PROCEDURE — 83605 ASSAY OF LACTIC ACID: CPT | Performed by: EMERGENCY MEDICINE

## 2019-06-29 PROCEDURE — 87449 NOS EACH ORGANISM AG IA: CPT | Performed by: PHYSICIAN ASSISTANT

## 2019-06-29 PROCEDURE — 87040 BLOOD CULTURE FOR BACTERIA: CPT | Performed by: PHYSICIAN ASSISTANT

## 2019-06-29 PROCEDURE — 94003 VENT MGMT INPAT SUBQ DAY: CPT | Performed by: SOCIAL WORKER

## 2019-06-29 PROCEDURE — 82805 BLOOD GASES W/O2 SATURATION: CPT | Performed by: SURGERY

## 2019-06-29 PROCEDURE — 94760 N-INVAS EAR/PLS OXIMETRY 1: CPT | Performed by: SOCIAL WORKER

## 2019-06-29 PROCEDURE — 93306 TTE W/DOPPLER COMPLETE: CPT

## 2019-06-29 PROCEDURE — 80048 BASIC METABOLIC PNL TOTAL CA: CPT | Performed by: EMERGENCY MEDICINE

## 2019-06-29 PROCEDURE — 03HY32Z INSERTION OF MONITORING DEVICE INTO UPPER ARTERY, PERCUTANEOUS APPROACH: ICD-10-PCS | Performed by: ORAL & MAXILLOFACIAL SURGERY

## 2019-06-29 PROCEDURE — 99291 CRITICAL CARE FIRST HOUR: CPT | Performed by: SURGERY

## 2019-06-29 PROCEDURE — 82805 BLOOD GASES W/O2 SATURATION: CPT | Performed by: EMERGENCY MEDICINE

## 2019-06-29 PROCEDURE — 93306 TTE W/DOPPLER COMPLETE: CPT | Performed by: INTERNAL MEDICINE

## 2019-06-29 PROCEDURE — NC001 PR NO CHARGE: Performed by: SURGERY

## 2019-06-29 PROCEDURE — 84145 PROCALCITONIN (PCT): CPT | Performed by: EMERGENCY MEDICINE

## 2019-06-29 PROCEDURE — 87205 SMEAR GRAM STAIN: CPT | Performed by: PHYSICIAN ASSISTANT

## 2019-06-29 PROCEDURE — 85025 COMPLETE CBC W/AUTO DIFF WBC: CPT | Performed by: EMERGENCY MEDICINE

## 2019-06-29 PROCEDURE — 36620 INSERTION CATHETER ARTERY: CPT | Performed by: SURGERY

## 2019-06-29 PROCEDURE — 4A133J1 MONITORING OF ARTERIAL PULSE, PERIPHERAL, PERCUTANEOUS APPROACH: ICD-10-PCS | Performed by: ORAL & MAXILLOFACIAL SURGERY

## 2019-06-29 PROCEDURE — 82805 BLOOD GASES W/O2 SATURATION: CPT | Performed by: PHYSICIAN ASSISTANT

## 2019-06-29 PROCEDURE — 71045 X-RAY EXAM CHEST 1 VIEW: CPT

## 2019-06-29 PROCEDURE — 4A133B1 MONITORING OF ARTERIAL PRESSURE, PERIPHERAL, PERCUTANEOUS APPROACH: ICD-10-PCS | Performed by: ORAL & MAXILLOFACIAL SURGERY

## 2019-06-29 PROCEDURE — 94760 N-INVAS EAR/PLS OXIMETRY 1: CPT

## 2019-06-29 PROCEDURE — 87070 CULTURE OTHR SPECIMN AEROBIC: CPT | Performed by: PHYSICIAN ASSISTANT

## 2019-06-29 PROCEDURE — 87081 CULTURE SCREEN ONLY: CPT | Performed by: PHYSICIAN ASSISTANT

## 2019-06-29 RX ORDER — HYDROMORPHONE HCL/PF 1 MG/ML
1 SYRINGE (ML) INJECTION ONCE
Status: DISCONTINUED | OUTPATIENT
Start: 2019-06-29 | End: 2019-07-01

## 2019-06-29 RX ORDER — LIDOCAINE HYDROCHLORIDE 10 MG/ML
INJECTION, SOLUTION EPIDURAL; INFILTRATION; INTRACAUDAL; PERINEURAL
Status: COMPLETED
Start: 2019-06-29 | End: 2019-06-29

## 2019-06-29 RX ORDER — OXYCODONE HYDROCHLORIDE 5 MG/1
5 TABLET ORAL EVERY 4 HOURS PRN
Status: DISCONTINUED | OUTPATIENT
Start: 2019-06-29 | End: 2019-06-29

## 2019-06-29 RX ORDER — SENNOSIDES 8.6 MG
1 TABLET ORAL
Status: DISCONTINUED | OUTPATIENT
Start: 2019-06-29 | End: 2019-07-04

## 2019-06-29 RX ORDER — FAMOTIDINE 40 MG/5ML
20 POWDER, FOR SUSPENSION ORAL EVERY 12 HOURS SCHEDULED
Status: DISCONTINUED | OUTPATIENT
Start: 2019-06-29 | End: 2019-06-30

## 2019-06-29 RX ORDER — LIDOCAINE HYDROCHLORIDE 10 MG/ML
INJECTION, SOLUTION EPIDURAL; INFILTRATION; INTRACAUDAL; PERINEURAL
Status: DISCONTINUED
Start: 2019-06-29 | End: 2019-06-29 | Stop reason: WASHOUT

## 2019-06-29 RX ORDER — ACETAMINOPHEN 160 MG/5ML
650 SUSPENSION, ORAL (FINAL DOSE FORM) ORAL EVERY 8 HOURS
Status: DISCONTINUED | OUTPATIENT
Start: 2019-06-29 | End: 2019-07-06

## 2019-06-29 RX ORDER — DOCUSATE SODIUM 100 MG/1
100 CAPSULE, LIQUID FILLED ORAL 2 TIMES DAILY
Status: DISCONTINUED | OUTPATIENT
Start: 2019-06-29 | End: 2019-07-02

## 2019-06-29 RX ORDER — HEPARIN SODIUM 5000 [USP'U]/ML
5000 INJECTION, SOLUTION INTRAVENOUS; SUBCUTANEOUS EVERY 8 HOURS SCHEDULED
Status: DISCONTINUED | OUTPATIENT
Start: 2019-06-29 | End: 2019-07-08 | Stop reason: HOSPADM

## 2019-06-29 RX ORDER — HYDROMORPHONE HCL/PF 1 MG/ML
1 SYRINGE (ML) INJECTION EVERY 4 HOURS PRN
Status: DISCONTINUED | OUTPATIENT
Start: 2019-06-29 | End: 2019-07-02

## 2019-06-29 RX ORDER — HYDROMORPHONE HCL/PF 1 MG/ML
0.5 SYRINGE (ML) INJECTION EVERY 4 HOURS PRN
Status: DISCONTINUED | OUTPATIENT
Start: 2019-06-29 | End: 2019-07-02

## 2019-06-29 RX ORDER — CHLORHEXIDINE GLUCONATE 0.12 MG/ML
15 RINSE ORAL EVERY 12 HOURS SCHEDULED
Status: DISCONTINUED | OUTPATIENT
Start: 2019-06-29 | End: 2019-07-02

## 2019-06-29 RX ORDER — MIDAZOLAM HYDROCHLORIDE 1 MG/ML
2 INJECTION INTRAMUSCULAR; INTRAVENOUS EVERY 4 HOURS PRN
Status: DISCONTINUED | OUTPATIENT
Start: 2019-06-29 | End: 2019-06-30

## 2019-06-29 RX ORDER — DEXAMETHASONE SODIUM PHOSPHATE 4 MG/ML
8 INJECTION, SOLUTION INTRA-ARTICULAR; INTRALESIONAL; INTRAMUSCULAR; INTRAVENOUS; SOFT TISSUE EVERY 8 HOURS
Status: COMPLETED | OUTPATIENT
Start: 2019-06-29 | End: 2019-06-29

## 2019-06-29 RX ORDER — OXYCODONE HYDROCHLORIDE 5 MG/1
2.5 TABLET ORAL EVERY 4 HOURS PRN
Status: DISCONTINUED | OUTPATIENT
Start: 2019-06-29 | End: 2019-06-29

## 2019-06-29 RX ADMIN — HEPARIN SODIUM 5000 UNITS: 5000 INJECTION INTRAVENOUS; SUBCUTANEOUS at 21:21

## 2019-06-29 RX ADMIN — CHLORHEXIDINE GLUCONATE 0.12% ORAL RINSE 15 ML: 1.2 LIQUID ORAL at 21:21

## 2019-06-29 RX ADMIN — Medication 1 SPRAY: at 09:10

## 2019-06-29 RX ADMIN — CHLORHEXIDINE GLUCONATE 0.12% ORAL RINSE 15 ML: 1.2 LIQUID ORAL at 09:07

## 2019-06-29 RX ADMIN — Medication 1 SPRAY: at 17:58

## 2019-06-29 RX ADMIN — CEFEPIME HYDROCHLORIDE 2000 MG: 2 INJECTION, POWDER, FOR SOLUTION INTRAVENOUS at 21:21

## 2019-06-29 RX ADMIN — DEXAMETHASONE SODIUM PHOSPHATE 8 MG: 4 INJECTION, SOLUTION INTRAMUSCULAR; INTRAVENOUS at 09:07

## 2019-06-29 RX ADMIN — HEPARIN SODIUM 5000 UNITS: 5000 INJECTION INTRAVENOUS; SUBCUTANEOUS at 13:22

## 2019-06-29 RX ADMIN — ACETAMINOPHEN 650 MG: 160 SUSPENSION ORAL at 16:26

## 2019-06-29 RX ADMIN — METRONIDAZOLE 500 MG: 500 INJECTION, SOLUTION INTRAVENOUS at 16:27

## 2019-06-29 RX ADMIN — ACETAMINOPHEN 650 MG: 160 SUSPENSION ORAL at 22:00

## 2019-06-29 RX ADMIN — SENNOSIDES 8.6 MG: 8.6 TABLET, FILM COATED ORAL at 21:21

## 2019-06-29 RX ADMIN — PERFLUTREN 0.4 ML/MIN: 6.52 INJECTION, SUSPENSION INTRAVENOUS at 10:26

## 2019-06-29 RX ADMIN — VANCOMYCIN HYDROCHLORIDE 750 MG: 750 INJECTION, SOLUTION INTRAVENOUS at 18:01

## 2019-06-29 RX ADMIN — Medication 1 SPRAY: at 21:22

## 2019-06-29 RX ADMIN — FAMOTIDINE 20 MG: 40 POWDER, FOR SUSPENSION ORAL at 11:56

## 2019-06-29 RX ADMIN — SODIUM CHLORIDE 0.5 MCG/KG/HR: 9 INJECTION, SOLUTION INTRAVENOUS at 17:53

## 2019-06-29 RX ADMIN — VANCOMYCIN HYDROCHLORIDE 750 MG: 750 INJECTION, SOLUTION INTRAVENOUS at 11:41

## 2019-06-29 RX ADMIN — PROPOFOL 50 MCG/KG/MIN: 10 INJECTION, EMULSION INTRAVENOUS at 11:40

## 2019-06-29 RX ADMIN — FENTANYL CITRATE 100 MCG/HR: 50 INJECTION, SOLUTION INTRAMUSCULAR; INTRAVENOUS at 13:30

## 2019-06-29 RX ADMIN — HYDROMORPHONE HYDROCHLORIDE 1 MG: 1 INJECTION, SOLUTION INTRAMUSCULAR; INTRAVENOUS; SUBCUTANEOUS at 07:56

## 2019-06-29 RX ADMIN — SODIUM CHLORIDE 0.4 MCG/KG/HR: 9 INJECTION, SOLUTION INTRAVENOUS at 08:58

## 2019-06-29 RX ADMIN — LIDOCAINE HYDROCHLORIDE 50 MG: 10 INJECTION, SOLUTION EPIDURAL; INFILTRATION; INTRACAUDAL; PERINEURAL at 01:30

## 2019-06-29 RX ADMIN — FENTANYL CITRATE 50 MCG: 50 INJECTION INTRAMUSCULAR; INTRAVENOUS at 01:29

## 2019-06-29 RX ADMIN — METOCLOPRAMIDE 10 MG: 5 INJECTION, SOLUTION INTRAMUSCULAR; INTRAVENOUS at 11:15

## 2019-06-29 RX ADMIN — Medication 1 SPRAY: at 11:56

## 2019-06-29 RX ADMIN — METRONIDAZOLE 500 MG: 500 INJECTION, SOLUTION INTRAVENOUS at 01:34

## 2019-06-29 RX ADMIN — HEPARIN SODIUM 5000 UNITS: 5000 INJECTION INTRAVENOUS; SUBCUTANEOUS at 09:09

## 2019-06-29 RX ADMIN — SODIUM CHLORIDE, SODIUM GLUCONATE, SODIUM ACETATE, POTASSIUM CHLORIDE, MAGNESIUM CHLORIDE, SODIUM PHOSPHATE, DIBASIC, AND POTASSIUM PHOSPHATE 75 ML/HR: .53; .5; .37; .037; .03; .012; .00082 INJECTION, SOLUTION INTRAVENOUS at 11:52

## 2019-06-29 RX ADMIN — FENTANYL CITRATE 50 MCG: 50 INJECTION INTRAMUSCULAR; INTRAVENOUS at 06:24

## 2019-06-29 RX ADMIN — FAMOTIDINE 20 MG: 40 POWDER, FOR SUSPENSION ORAL at 21:21

## 2019-06-29 RX ADMIN — METRONIDAZOLE 500 MG: 500 INJECTION, SOLUTION INTRAVENOUS at 09:10

## 2019-06-29 RX ADMIN — DEXAMETHASONE SODIUM PHOSPHATE 8 MG: 4 INJECTION, SOLUTION INTRAMUSCULAR; INTRAVENOUS at 16:26

## 2019-06-29 RX ADMIN — CEFEPIME HYDROCHLORIDE 2000 MG: 2 INJECTION, POWDER, FOR SOLUTION INTRAVENOUS at 10:22

## 2019-06-29 RX ADMIN — MIDAZOLAM 2 MG: 1 INJECTION INTRAMUSCULAR; INTRAVENOUS at 11:15

## 2019-06-29 RX ADMIN — PROPOFOL 35 MCG/KG/MIN: 10 INJECTION, EMULSION INTRAVENOUS at 04:46

## 2019-06-29 RX ADMIN — CEFEPIME HYDROCHLORIDE 2000 MG: 2 INJECTION, POWDER, FOR SOLUTION INTRAVENOUS at 00:23

## 2019-06-29 NOTE — PROGRESS NOTES
Progress Note - Critical Care   Bar Aparicio 20 y o  female MRN: [id number]  Unit/Bed#: ICU 13 Encounter: [id number]    Assessment:  Patient is a 50-year-old female who presents after an elective procedure by OMFS surgery for congenital abnormality  Patient had acute respiratory failure yesterday requiring re-intubation  Patient also developed tachycardia, fever concerning for sepsis and was started on broad-spectrum antibiotics  Acute respiratory failure  ARDS   Maxillofacial surgery   Lactic acidosis   Aspiration     Plan:          Neuro:    no acute issues   following commands   GCS 10T (E3 V1 M6)    analgesia: Tylenol 650 mg Q8H scheduled, oxy 2 5/oxy 5/Dilaudid 0 5 for moderate/severe/ breakthrough pain  Respectively   sedation:  Fentanyl and propofol drips    Titrate to RASS -1                 CV:   Tachycardia  · Unclear etiology at this time: sepsis vs  Pain vs  Agitation  · Patient tachycardic since pre-op yesterday  · Continues to be tachycardic despite fluild  Resuscitation  · Tachycardia improved transiently with 1 unit PRBCs  · Continue to monitor     MAPs > 65                 Lung:   Acute Respiratory Failure/ARDS  ·  patient continued to desaturate on  Non-rebreather and high-flow nasal cannula  ·  patient re-intubated in the OR overnight  ·  chest x-ray shows diffuse interstitial infiltrates suggestive of possible ARDS  ·  per OMFS surgeon, patient aspirated a large amount of blood during extubation  ·  patient currently on high vent settings:  14/400/90/10  · Wean vent as able   · PaO2/FiO2 153- consistent with moderate ARDS   Aspiration pneumonitis vs pneumonia  ·  patient with major aspiration event perioperatively and vomiting blood with possible aspiration postoperatively  ·  patient febrile overnight with T-max 102°  ·  right lower lobe crackles auscultated  ·  treated with broad-spectrum antibiotics including vancomycin, cefepime, Flagyl                 GI:   No acute issues   Nausea PRN: zofran 4 mg and reglan 10 mg   Bowel regimen: senna and colace                  FEN:   F: isolyte 75 ml/hr  E: Replete as needed   Lactic acidosis cleared overnight  N: NPO - tube feeds today? :   No acute issues  Producing urine  Gomez in place for I's and O's                 ID:   Possible Aspiration Pneumonia  ·  febrile, tachycardic, tachypneic overnight  ·  vancomycin, cefepime, Flagyl  ·  follow MRSA swab, blood cultures, procalcitonin                 Heme:   1 unit PRBCs given overnight  DVT prophylaxis: start heparin subcu   SCDs on                 Endo:   No acute issues                             Msk/Skin:   No acute issues  Patient is still oozing from oral cavity, will continue to follow                  Disposition: ICU     Chief Complaint: NA    HPI/24hr events:  Patient had significant respiratory distress with desaturation events on high-flow nasal cannula and non-rebreather mask  Patient taken to OR for controlled re-intubation  Patient was tachycardic, febrile, tachypneic overnight in the setting of recent aspiration concerning for aspiration pneumonia  Patient started on broad-spectrum antibiotics including vancomycin, cefepime, Flagyl  Chest x-ray and ABG consistent with ARDS  Physical Exam:   Physical Exam   Constitutional: She is oriented to person, place, and time  She appears well-developed and well-nourished  No distress  HENT:   Head: Normocephalic  Significant swelling  periorally   Eyes: Pupils are equal, round, and reactive to light  3 mm reactive   Neck: Normal range of motion  Neck supple  Cardiovascular: Regular rhythm, normal heart sounds and intact distal pulses  Tachycardic, no murmurs rubs or gallops  Pulmonary/Chest: Effort normal and breath sounds normal    Right lower lobe crackles auscultated  No wheezing heard equal breath sounds   Abdominal: Soft  Bowel sounds are normal  She exhibits no distension  There is no tenderness   There is no guarding  Abdomen is soft, nondistended, nontender  No rebound tenderness or guarding is noted  No masses palpated  Normal bowel sounds  Musculoskeletal: Normal range of motion  She exhibits no edema, tenderness or deformity  Neurological: She is alert and oriented to person, place, and time  No cranial nerve deficit or sensory deficit  GCS 10 T (E3 V1 M6)  Following commands in all 4 extremities  Skin: Skin is warm and dry  Capillary refill takes less than 2 seconds  Psychiatric: She has a normal mood and affect  Her behavior is normal  Judgment and thought content normal    Vitals reviewed  Vitals:    19 0345 19 0400 19 0408 19 0500   BP: (!) 85/52      BP Location: Right arm      Pulse: 100 98  100   Resp: 16 15  17   Temp: 99 7 °F (37 6 °C) 99 7 °F (37 6 °C)  99 7 °F (37 6 °C)   TempSrc: Probe Probe  Probe   SpO2: 100% 100% 98% 99%   Weight:       Height:         Arterial Line BP: 102/50  Arterial Line MAP (mmHg): 66 mmHg    Temperature:   Temp (24hrs), Av 4 °F (37 4 °C), Min:96 5 °F (35 8 °C), Max:101 1 °F (38 4 °C)    Current: Temperature: 99 7 °F (37 6 °C)    Weights:   IBW: 54 7 kg    Body mass index is 20 43 kg/m²    Weight (last 2 days)     Date/Time   Weight    19 0656   54 (119)              Hemodynamic Monitoring:  N/A     Non-Invasive/Invasive Ventilation Settings:  Respiratory    Lab Data (Last 4 hours)       0440            pH, Arterial       7 328           pCO2, Arterial       40 5           pO2, Arterial       153 3           HCO3, Arterial       20 8           Base Excess, Arterial       -4 8                O2/Vent Data        0408   Most Recent         Vent Mode AC/VC  AC/VC      Resp Rate (BPM) (BPM) 14  14      Vt (mL) (mL) 400  400      FIO2 (%) (%) 100  100      PEEP (cmH2O) (cmH2O) 10  10      MV 7 07  7 07                Lab Results   Component Value Date    PHART 7 328 (L) 2019    MWR5LEB 40 5 2019 PO2ART 153 3 (H) 06/29/2019    WQO0OCA 20 8 (L) 06/29/2019    BEART -4 8 06/29/2019    SOURCE Line, Arterial 06/29/2019     SpO2: SpO2: 99 %    Intake and Outputs:  I/O       06/27 0701 - 06/28 0700 06/28 0701 - 06/29 0700    I V  (mL/kg)  8636 2 (159 9)    Blood  350    NG/GT  0    IV Piggyback  1600    Total Intake(mL/kg)  09652 2 (196)    Urine (mL/kg/hr)  2275 (1 8)    Blood  350    Total Output  2625    Net  +7961 2              UOP: 166 ml/hour   Nutrition:        Diet Orders   (From admission, onward)            Start     Ordered    06/28/19 1737  Dietary nutrition supplements  Once     Question Answer Comment   Select Supplement: Ensure Compact-Vanilla    Frequency Lunch        06/28/19 1738 06/28/19 1736  Dietary nutrition supplements  Once     Question Answer Comment   Select Supplement: Ensure Enlive-Chocolate    Frequency Breakfast        06/28/19 1738    06/28/19 1735  Diet Clear Liquid  Diet effective now     Question Answer Comment   Diet Type Clear Liquid    RD to adjust diet per protocol?  No        06/28/19 1738    06/28/19 1735  Dietary nutrition supplements  Once     Question Answer Comment   Select Supplement: Ensure Enlive-Strawberry    Frequency At Bedtime        06/28/19 1738        NPO    Labs:   Results from last 7 days   Lab Units 06/29/19 0439 06/29/19  0200 06/28/19  2309 06/28/19  2058   WBC Thousand/uL 14 63* 14 65*  --  11 36*   HEMOGLOBIN g/dL 10 5* 10 5*  --  8 9*   I STAT HEMOGLOBIN g/dl  --   --  8 2*  --    HEMATOCRIT % 32 3* 31 9*  --  27 7*   HEMATOCRIT, ISTAT %  --   --  24*  --    PLATELETS Thousands/uL 140* 142*  --  142*   NEUTROS PCT % 82* 89*  --   --    MONOS PCT % 11 6  --   --    MONO PCT %  --   --   --  4      Results from last 7 days   Lab Units 06/29/19  0439 06/28/19  2309 06/28/19  1731 06/28/19  0739   SODIUM mmol/L 140  --  138 140   POTASSIUM mmol/L 4 6  --  3 9 3 9   CHLORIDE mmol/L 112*  --  109* 106   CO2 mmol/L 23  --  19* 28   CO2, I-STAT mmol/L  -- 22  --   --    BUN mg/dL 12  --  13 12   CREATININE mg/dL 0 69  --  1 05 0 84   CALCIUM mg/dL 7 3*  --  7 5* 9 3   GLUCOSE, ISTAT mg/dl  --  157*  --   --                   Results from last 7 days   Lab Units 06/29/19  0439   LACTIC ACID mmol/L 1 5     No results found for: TROPONINI    Imaging:    mild patchy interstitial infiltrate   I have personally reviewed pertinent reports        EKG:  No new    Micro:  No results found for: Norlin Aristides, WOUNDCULT, SPUTUMCULTUR    Allergies: No Known Allergies    Medications:   Scheduled Meds:    Current Facility-Administered Medications:  acetaminophen 650 mg Oral Q8H Pleasant Earing, MD    albuterol 2 5 mg Nebulization Q6H PRN Pleasant Earing, MD    cefepime 2,000 mg Intravenous Q12H Pleasant Earing, MD Last Rate: Stopped (06/29/19 0053)   chlorhexidine 30 mL Swish & Spit 5x Daily Pleasant EarMD shanel    docusate sodium 100 mg Oral BID Doug Holt MD    fentaNYL 50 mcg/hr Intravenous Continuous Doug Holt MD Last Rate: 50 mcg/hr (06/28/19 2352)   fentanyl citrate (PF)        HYDROmorphone 0 5 mg Intravenous Q4H PRN Pleasant Earing, MD    metoclopramide 10 mg Intravenous Q6H PRN Pleasant Earing, MD    metroNIDAZOLE 500 mg Intravenous Q8H Pleasant Yuing, MD Last Rate: Stopped (06/29/19 8499)   multi-electrolyte 75 mL/hr Intravenous Continuous Doug Holt MD Last Rate: 75 mL/hr (06/28/19 1953)   ondansetron 4 mg Intravenous Q8H PRN Doug Holt, MD    oxyCODONE 2 5 mg Oral Q4H PRN Pleasant Earing, MD    oxyCODONE 5 mg Oral Q4H PRN Pleasant Yuing, MD    propofol 5-50 mcg/kg/min Intravenous Titrated Doug Holt MD Last Rate: 40 mcg/kg/min (06/29/19 0455)   senna 1 tablet Oral HS Doug Holt MD    sodium chloride 1 spray Each Nare 4x Daily Doug Holt MD      Continuous Infusions:    fentaNYL 50 mcg/hr Last Rate: 50 mcg/hr (06/28/19 2352)   multi-electrolyte 75 mL/hr Last Rate: 75 mL/hr (06/28/19 1953) propofol 5-50 mcg/kg/min Last Rate: 40 mcg/kg/min (06/29/19 9997)     PRN Meds:    albuterol 2 5 mg Q6H PRN   HYDROmorphone 0 5 mg Q4H PRN   metoclopramide 10 mg Q6H PRN   ondansetron 4 mg Q8H PRN   oxyCODONE 2 5 mg Q4H PRN   oxyCODONE 5 mg Q4H PRN       VTE Pharmacologic Prophylaxis: Heparin  VTE Mechanical Prophylaxis: sequential compression device    Invasive lines and devices: Invasive Devices     Peripheral Intravenous Line            Peripheral IV 06/28/19 Left Antecubital less than 1 day    Peripheral IV 06/28/19 Proximal;Right;Ventral (anterior) Forearm less than 1 day          Arterial Line            Arterial Line 06/29/19 Radial less than 1 day          Drain            NG/OG/Enteral Tube Orogastric Center mouth less than 1 day    Urethral Catheter Temperature probe 16 Fr  less than 1 day          Airway            ETT  Hi-Lo; Cuffed;Oral 7 mm less than 1 day                   Counseling / Coordination of Care  Total Critical Care time spent 56 minutes excluding procedures, teaching and family updates  Code Status: Level 1 - Full Code     Portions of the record may have been created with voice recognition software  Occasional wrong word or "sound a like" substitutions may have occurred due to the inherent limitations of voice recognition software  Read the chart carefully and recognize, using context, where substitutions have occurred       Michael Cantrell MD

## 2019-06-29 NOTE — QUICK NOTE
Patient has been consistently desaturating into the 70s when she is agitated  Patient recovers her oxygen saturation slowly when she is re-directed and is subsequently saturates in the 90s  Patient vomiting blood with surgeon at bedside  Given Zofran 4 mg/Reglan 10 mg  Due to ongoing desaturation events, patient placed on HFNC 100%  I will continue to monitor her respiratory status over the next hour to determine if further intervention needs to be taken  Initial lactate 5 3, base deficit 8, hgb dropped from 13 7 to 9 4  Patient bolused 2 L isolyte since 6929  Repeat labs at 2100

## 2019-06-29 NOTE — RESPIRATORY THERAPY NOTE
RT Ventilator Management Note  Tad Callahan 20 y o  female MRN: [id number]  Unit/Bed#: ICU 13 Encounter: [id number]      Daily Screen     No data found in the last 10 encounters  Physical Exam:   Assessment Type: Assess only  General Appearance: Sedated  Respiratory Pattern: Assisted  Chest Assessment: Chest expansion symmetrical  O2 Device: vent      Resp Comments: pt remained on AC/VC mode/settings all night   No vent changes made

## 2019-06-29 NOTE — SOCIAL WORK
Attempted top reach mother x3 to complete assessment, goes to  with no identification of caller, message not left    Visited pt in ICU, no visitors

## 2019-06-29 NOTE — OP NOTE
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]    Procedure(s) (LRB):  BRONCHOSCOPY FLEXIBLE, CONTROLLED INTUBATION (N/A)    Specimen(s):  * No specimens in log *    Estimated Blood Loss:   Minimal    Drains:  NG/OG/Enteral Tube Orogastric Center mouth (Active)   Placement Reverification X-ray 2019 12:00 AM   Site Assessment Clean;Dry; Intact 2019 12:00 AM   Status Suction-low continuous 2019 12:00 AM   Drainage Appearance Bile;Brown 2019 12:00 AM   Intake (mL) 0 mL 2019 12:00 AM   Number of days: 1       Urethral Catheter Temperature probe 16 Fr  (Active)   Amt returned on insertion(mL) 550 mL 2019  8:58 PM   Site Assessment Clean;Skin intact 2019  8:58 PM   Collection Container Standard drainage bag 2019  8:58 PM   Securement Method Securing device (Describe) 2019  8:58 PM   Output (mL) 400 mL 2019 10:25 PM   Number of days: 1       [REMOVED] Urethral Catheter Temperature probe;Non-latex 16 Fr  (Removed)   Number of days: 0       Anesthesia Type:   * No anesthesia type entered *    Operative Indications:  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]  Acute Hypoxic Respiratory Failure     Operative Findings:  Intubation successful by anesthesia team  Mild amount of thin secretions note on bronchoscopy    Complications:   None    Procedure and Technique:  Patient was taken down to OR for urgent intubation secondary high risk due to recent facial surgery  Dr Mickey Strong was also present during the intubation  Intubation successful by anesthesia team      At time of intubation, copious amounts of secretions noted from ETT   A bronchoscopic evaluation was then performed by myself  Only a mild amount of thin secretions where noted  No plugging, blood, or purulence noted  Pt  Was returned to ICU intubated in a critical condition      Patient Disposition:  Critical Care Unit    SIGNATURE: Du Chapman DO  DATE: June 29, 2019  TIME: 1:17 AM

## 2019-06-29 NOTE — PROCEDURES
Arterial Line Insertion  Date/Time: 6/29/2019 2:01 AM  Performed by: Yolanda Goff MD  Authorized by: Yolanda Goff MD     Patient location:  Bedside  Other Assisting Provider: No    Consent:     Consent obtained:  Emergent situation  Universal protocol:     Relevant documents present and verified: yes      Test results available and properly labeled: yes      Radiology Images displayed and confirmed  If images not available, report reviewed: yes      Required blood products, implants, devices, and special equipment available: yes      Site/side marked: yes      Immediately prior to procedure a time out was called: yes      Patient identity confirmed:  Arm band  Indications:     Indications: hemodynamic monitoring, multiple ABGs, continuous blood pressure monitoring and frequent labs / infusion    Pre-procedure details:     Skin preparation:  Chlorhexidine    Preparation: Patient was prepped and draped in sterile fashion    Sedation:     Sedation type: Moderate (conscious) sedation  Anesthesia (see MAR for exact dosages): Anesthesia method:  Local infiltration    Local anesthetic:  Lidocaine 1% w/o epi  Procedure details:     Location / Tip of Catheter:  Radial    Laterality:  Left    Sanjay's test performed: no      Needle gauge:  18 G    Placement technique:  Seldinger    Number of attempts:  2    Successful placement: yes      Transducer: waveform confirmed    Post-procedure details:     Post-procedure:  Biopatch applied, sutured and secured with tape    CMS:  Unchanged    Patient tolerance of procedure:   Tolerated well, no immediate complications

## 2019-06-29 NOTE — PROGRESS NOTES
[facility] Am Note    19 y/o F s/p elective jaw surgery involving bimaxillary surgery, chin and floor of the nose  She was admitted post op to SICU for airway observation  She was tachycardic in spite of no evident surgical pain, and with O2 saturation in the low 90s  Her breathing gradually worsened in the hours post-op  She was placed on non rebreather mask and then on high flow nasal canula  Chest x-ray revealed B/L pulmonary infiltrates  The patient was taken back to the OR for intubation, I was present at bedside, the intubation was uneventful  She was placed on ventilation support  She also received 1uPRBC  I informed her mother concerning the events, over phone  Her status is improved this morning  She is currently sedated, on vent support, stable from CVS standpoint  No bleeding or wound issues from the maxillofacial surgery  The support from the ICU team is appreciated  P/E  GUS: moderate swelling to midface, as expected after surgery  Nose: dried blood to nostrils, no active bleeding  Mouth: oral intubation in midline, secured with adhesive retention to B/L cheeks  Upper occlusal splint in place, well retained  Sutures to upper/lower vestibule intact and hemostatic  No oral or lip lacerations  Neck: moderate swelling to lower face, as expected  Mild ecchymosis to the R  A-line in place  Gomez with clear urine production    A: s/p bimaxillary jaw surgery, complicated with ARDS, likely from post-op aspiration  Intubated, sedated, respiratory and cardiovascular status stable  No ongoing issues from the maxillofacial surgery      Recommendations:  Continue antibiotics  Maintain adequate sedation while patient intubates in order to prevent biting of the tube- which could affect the surgical healing of the jaws  Tube feeds as pt will remain intubated  Ice packs to face  HOB 30  Decadron 8mg this am and then 8h after- then can stop  DVT prophylaxis  Overall management as per ICU team  Will F/U  Call me if any issue

## 2019-06-29 NOTE — PROGRESS NOTES
Called to pt  Bedside with concerns of desaturation into the 70s improved intermittently on NRB  Postop labs concerning for lactic acidosis and significant base deficit  Drop in hgb also noted  Pt  Tachycardic although normotensive  A total of 2L crystalloid bolused with worsening lactate/base deficit and now febrile  Postop CXR w bilateral infiltrates  Concern for aspiration event given CXR, worsening endpoints, and hypoxia  Pt was placed on HFNC with minimal improvement in O2sats to 80s - low 90s  Discussed pt clinical state with primary surgeon  Decision made to intubate pt in OR given high risk intubation  Postoperative ABLA will give 1un PRBC      I have spent a total of 70 minutes in the care of this patient  Of this time,70 minutes was spent in directly providing critical care services, which may include (but not limited to) titration of vasoactive medications, ventilator management, interpretation of hemodynamic data, managing enteral or parenteral nutritional support, complex medical decision making, management of multiple organ system failure, evaluating for the presence of life-threatening injuries or illnesses, or interpretation of complex medical databases  This does not include time spent on procedures or in family discussions

## 2019-06-29 NOTE — UTILIZATION REVIEW
Initial Clinical Review    Elective IP surgical procedure  Age/Sex: 20 y o  female  Surgery Date: 6/28/2019  Procedure: BRONCHOSCOPY FLEXIBLE, CONTROLLED INTUBATION (N/A)  Anesthesia:   Operative Findings:   Intubation successful by anesthesia team  Mild amount of thin secretions note on bronchoscopy    Admission Orders: Date/Time/Statement: 6/28/19 @ 1658   Orders Placed This Encounter   Procedures    Inpatient Admission     Standing Status:   Standing     Number of Occurrences:   1     Order Specific Question:   Admitting Physician     Answer:   Aaron Parham     Order Specific Question:   Level of Care     Answer:   Critical Care [15]     Order Specific Question:   Estimated length of stay     Answer:   More than 2 Midnights     Comments:   2 midnights     Order Specific Question:   Certification     Answer:   I certify that inpatient services are medically necessary for this patient for a duration of greater than two midnights  See H&P and MD Progress Notes for additional information about the patient's course of treatment       Vital Signs: BP (!) 85/52 (BP Location: Right arm)   Pulse (!) 128   Temp 100 4 °F (38 °C)   Resp (!) 24   Ht 5' 4" (1 626 m)   Wt 54 kg (119 lb)   SpO2 94%   BMI 20 43 kg/m²   Diet: Jevity 1 2 leland @ 46 ml/hr via Peg tube  Mobility: activity as sommer  DVT Prophylaxis: b/l venodynes    Scheduled Meds:  Current Facility-Administered Medications:  acetaminophen 650 mg Oral Q8H   albuterol 2 5 mg Nebulization Q6H PRN   cefepime 2,000 mg Intravenous Q12H   chlorhexidine 15 mL Mouth/Throat Q12H DIXON   dexamethasone 8 mg Intravenous Q8H   dexmedetomidine 0 1-0 7 mcg/kg/hr Intravenous Titrated   docusate sodium 100 mg Oral BID   famotidine 20 mg Oral Q12H DIXON   fentaNYL 100 mcg/hr Intravenous Continuous   heparin (porcine) 5,000 Units Subcutaneous Q8H [address]   HYDROmorphone 0 5 mg Intravenous Q4H PRN   HYDROmorphone 1 mg Intravenous Once   HYDROmorphone 1 mg Intravenous Q4H PRN metoclopramide 10 mg Intravenous Q6H PRN   metroNIDAZOLE 500 mg Intravenous Q8H   midazolam 2 mg Intravenous Q4H PRN   multi-electrolyte 75 mL/hr Intravenous Continuous   ondansetron 4 mg Intravenous Q8H PRN   propofol 5-50 mcg/kg/min Intravenous Titrated   senna 1 tablet Oral HS   sodium chloride 1 spray Each Nare 4x Daily   vancomycin 15 mg/kg Intravenous Q8H     :      Network Utilization Review Department  Phone: [phone]; Fax [phone]  [email or web address]  ATTENTION: Please call with any questions or concerns to [phone]  and carefully listen to the prompts so that you are directed to the right person  Send all requests for admission clinical reviews, approved or denied determinations and any other requests to fax [phone]   All voicemails are confidential

## 2019-06-29 NOTE — RESPIRATORY THERAPY NOTE
RT Ventilator Management Note  Yumiko Vásquez 20 y o  female MRN: [id number]  Unit/Bed#: ICU 13 Encounter: [id number]      Daily Screen       6/29/2019  0800             Patient safety screen outcome[de-identified]  Failed    Not Ready for Weaning due to[de-identified]  PEEP > 8cmH2O;FiO2 >60%; Underline problem not resolved; Recieving paralytics            Physical Exam:   Assessment Type: Assess only  General Appearance: Sedated, Sleeping  Respiratory Pattern: Assisted  Chest Assessment: Chest expansion symmetrical  Bilateral Breath Sounds: Clear  O2 Device: vent      Resp Comments: Pt remains sedate  SBT not indicated due to increased vent setting  Will not move ET tube per ET tube protocol, due to pts recent oral surgery

## 2019-06-30 ENCOUNTER — APPOINTMENT (INPATIENT)
Dept: RADIOLOGY | Facility: HOSPITAL | Age: 19
DRG: 089 | End: 2019-06-30
Payer: COMMERCIAL

## 2019-06-30 LAB
ANION GAP SERPL CALCULATED.3IONS-SCNC: 4 MMOL/L (ref 4–13)
BASE EXCESS BLDA CALC-SCNC: -1.2 MMOL/L
BASE EXCESS BLDA CALC-SCNC: -2.3 MMOL/L
BASOPHILS # BLD AUTO: 0.01 THOUSANDS/ΜL (ref 0–0.1)
BASOPHILS NFR BLD AUTO: 0 % (ref 0–1)
BODY TEMPERATURE: 100.4 DEGREES FEHRENHEIT
BUN SERPL-MCNC: 18 MG/DL (ref 5–25)
CALCIUM SERPL-MCNC: 8 MG/DL (ref 8.3–10.1)
CHLORIDE SERPL-SCNC: 112 MMOL/L (ref 100–108)
CO2 SERPL-SCNC: 26 MMOL/L (ref 21–32)
CREAT SERPL-MCNC: 0.67 MG/DL (ref 0.6–1.3)
EOSINOPHIL # BLD AUTO: 0 THOUSAND/ΜL (ref 0–0.61)
EOSINOPHIL NFR BLD AUTO: 0 % (ref 0–6)
ERYTHROCYTE [DISTWIDTH] IN BLOOD BY AUTOMATED COUNT: 13.5 % (ref 11.6–15.1)
GFR SERPL CREATININE-BSD FRML MDRD: 127 ML/MIN/1.73SQ M
GLUCOSE SERPL-MCNC: 169 MG/DL (ref 65–140)
HCO3 BLDA-SCNC: 23.6 MMOL/L (ref 22–28)
HCO3 BLDA-SCNC: 24.1 MMOL/L (ref 22–28)
HCT VFR BLD AUTO: 30 % (ref 34.8–46.1)
HGB BLD-MCNC: 9.9 G/DL (ref 11.5–15.4)
HOROWITZ INDEX BLDA+IHG-RTO: 50 MM[HG]
IMM GRANULOCYTES # BLD AUTO: 0.08 THOUSAND/UL (ref 0–0.2)
IMM GRANULOCYTES NFR BLD AUTO: 1 % (ref 0–2)
LYMPHOCYTES # BLD AUTO: 0.6 THOUSANDS/ΜL (ref 0.6–4.47)
LYMPHOCYTES NFR BLD AUTO: 4 % (ref 14–44)
MAGNESIUM SERPL-MCNC: 2.3 MG/DL (ref 1.6–2.6)
MCH RBC QN AUTO: 30.7 PG (ref 26.8–34.3)
MCHC RBC AUTO-ENTMCNC: 33 G/DL (ref 31.4–37.4)
MCV RBC AUTO: 93 FL (ref 82–98)
MONOCYTES # BLD AUTO: 1.19 THOUSAND/ΜL (ref 0.17–1.22)
MONOCYTES NFR BLD AUTO: 8 % (ref 4–12)
MRSA NOSE QL CULT: NORMAL
NEUTROPHILS # BLD AUTO: 13.69 THOUSANDS/ΜL (ref 1.85–7.62)
NEUTS SEG NFR BLD AUTO: 87 % (ref 43–75)
NRBC BLD AUTO-RTO: 0 /100 WBCS
O2 CT BLDA-SCNC: 14 ML/DL (ref 16–23)
O2 CT BLDA-SCNC: 14.9 ML/DL (ref 16–23)
OXYHGB MFR BLDA: 92.6 % (ref 94–97)
OXYHGB MFR BLDA: 98.7 % (ref 94–97)
PCO2 BLDA: 43 MM HG (ref 36–44)
PCO2 BLDA: 45.4 MM HG (ref 36–44)
PCO2 TEMP ADJ BLDA: 47.4 MM HG (ref 36–44)
PEEP RESPIRATORY: 8 CM[H2O]
PH BLD: 7.32 [PH] (ref 7.35–7.45)
PH BLDA: 7.33 [PH] (ref 7.35–7.45)
PH BLDA: 7.37 [PH] (ref 7.35–7.45)
PHOSPHATE SERPL-MCNC: 1.1 MG/DL (ref 2.7–4.5)
PLATELET # BLD AUTO: 132 THOUSANDS/UL (ref 149–390)
PMV BLD AUTO: 10.6 FL (ref 8.9–12.7)
PO2 BLD: 72.9 MM HG (ref 75–129)
PO2 BLDA: 205 MM HG (ref 75–129)
PO2 BLDA: 68.1 MM HG (ref 75–129)
POTASSIUM SERPL-SCNC: 4 MMOL/L (ref 3.5–5.3)
RBC # BLD AUTO: 3.23 MILLION/UL (ref 3.81–5.12)
SODIUM SERPL-SCNC: 142 MMOL/L (ref 136–145)
SPECIMEN SOURCE: ABNORMAL
SPECIMEN SOURCE: ABNORMAL
VANCOMYCIN TROUGH SERPL-MCNC: 13.2 UG/ML (ref 10–20)
VENT AC: 14
VENT- AC: AC
VT SETTING VENT: 330 ML
WBC # BLD AUTO: 15.57 THOUSAND/UL (ref 4.31–10.16)

## 2019-06-30 PROCEDURE — 80048 BASIC METABOLIC PNL TOTAL CA: CPT | Performed by: PHYSICIAN ASSISTANT

## 2019-06-30 PROCEDURE — 82805 BLOOD GASES W/O2 SATURATION: CPT | Performed by: EMERGENCY MEDICINE

## 2019-06-30 PROCEDURE — 71045 X-RAY EXAM CHEST 1 VIEW: CPT

## 2019-06-30 PROCEDURE — 80202 ASSAY OF VANCOMYCIN: CPT | Performed by: PHYSICIAN ASSISTANT

## 2019-06-30 PROCEDURE — 99291 CRITICAL CARE FIRST HOUR: CPT | Performed by: SURGERY

## 2019-06-30 PROCEDURE — 74018 RADEX ABDOMEN 1 VIEW: CPT

## 2019-06-30 PROCEDURE — 84100 ASSAY OF PHOSPHORUS: CPT | Performed by: PHYSICIAN ASSISTANT

## 2019-06-30 PROCEDURE — 94760 N-INVAS EAR/PLS OXIMETRY 1: CPT

## 2019-06-30 PROCEDURE — 85025 COMPLETE CBC W/AUTO DIFF WBC: CPT | Performed by: PHYSICIAN ASSISTANT

## 2019-06-30 PROCEDURE — 93005 ELECTROCARDIOGRAM TRACING: CPT

## 2019-06-30 PROCEDURE — 36556 INSERT NON-TUNNEL CV CATH: CPT | Performed by: SURGERY

## 2019-06-30 PROCEDURE — 82805 BLOOD GASES W/O2 SATURATION: CPT | Performed by: PHYSICIAN ASSISTANT

## 2019-06-30 PROCEDURE — 02HV33Z INSERTION OF INFUSION DEVICE INTO SUPERIOR VENA CAVA, PERCUTANEOUS APPROACH: ICD-10-PCS | Performed by: ORAL & MAXILLOFACIAL SURGERY

## 2019-06-30 PROCEDURE — 94760 N-INVAS EAR/PLS OXIMETRY 1: CPT | Performed by: SOCIAL WORKER

## 2019-06-30 PROCEDURE — 83735 ASSAY OF MAGNESIUM: CPT | Performed by: PHYSICIAN ASSISTANT

## 2019-06-30 PROCEDURE — 94003 VENT MGMT INPAT SUBQ DAY: CPT | Performed by: SOCIAL WORKER

## 2019-06-30 RX ORDER — MIDAZOLAM HYDROCHLORIDE 1 MG/ML
2 INJECTION INTRAMUSCULAR; INTRAVENOUS ONCE
Status: COMPLETED | OUTPATIENT
Start: 2019-06-30 | End: 2019-06-30

## 2019-06-30 RX ORDER — VANCOMYCIN HYDROCHLORIDE 1 G/200ML
1000 INJECTION, SOLUTION INTRAVENOUS EVERY 8 HOURS
Status: DISCONTINUED | OUTPATIENT
Start: 2019-06-30 | End: 2019-06-30

## 2019-06-30 RX ORDER — FUROSEMIDE 10 MG/ML
20 INJECTION INTRAMUSCULAR; INTRAVENOUS ONCE
Status: COMPLETED | OUTPATIENT
Start: 2019-06-30 | End: 2019-06-30

## 2019-06-30 RX ORDER — MIDAZOLAM HYDROCHLORIDE 1 MG/ML
2 INJECTION INTRAMUSCULAR; INTRAVENOUS EVERY 2 HOUR PRN
Status: DISCONTINUED | OUTPATIENT
Start: 2019-06-30 | End: 2019-07-02

## 2019-06-30 RX ADMIN — VANCOMYCIN HYDROCHLORIDE 750 MG: 750 INJECTION, SOLUTION INTRAVENOUS at 11:03

## 2019-06-30 RX ADMIN — Medication 1 SPRAY: at 11:05

## 2019-06-30 RX ADMIN — FUROSEMIDE 20 MG: 10 INJECTION, SOLUTION INTRAMUSCULAR; INTRAVENOUS at 09:20

## 2019-06-30 RX ADMIN — HYDROMORPHONE HYDROCHLORIDE 1 MG: 1 INJECTION, SOLUTION INTRAMUSCULAR; INTRAVENOUS; SUBCUTANEOUS at 08:48

## 2019-06-30 RX ADMIN — MIDAZOLAM 2 MG: 1 INJECTION INTRAMUSCULAR; INTRAVENOUS at 16:00

## 2019-06-30 RX ADMIN — SODIUM CHLORIDE 1.4 MCG/KG/HR: 9 INJECTION, SOLUTION INTRAVENOUS at 22:06

## 2019-06-30 RX ADMIN — CHLORHEXIDINE GLUCONATE 0.12% ORAL RINSE 15 ML: 1.2 LIQUID ORAL at 08:35

## 2019-06-30 RX ADMIN — HEPARIN SODIUM 5000 UNITS: 5000 INJECTION INTRAVENOUS; SUBCUTANEOUS at 21:06

## 2019-06-30 RX ADMIN — FAMOTIDINE 20 MG: 40 POWDER, FOR SUSPENSION ORAL at 08:35

## 2019-06-30 RX ADMIN — HEPARIN SODIUM 5000 UNITS: 5000 INJECTION INTRAVENOUS; SUBCUTANEOUS at 05:32

## 2019-06-30 RX ADMIN — Medication 1 SPRAY: at 08:36

## 2019-06-30 RX ADMIN — POTASSIUM PHOSPHATE, MONOBASIC AND POTASSIUM PHOSPHATE, DIBASIC 30 MMOL: 224; 236 INJECTION, SOLUTION INTRAVENOUS at 07:34

## 2019-06-30 RX ADMIN — SENNOSIDES 8.6 MG: 8.6 TABLET, FILM COATED ORAL at 21:06

## 2019-06-30 RX ADMIN — SODIUM CHLORIDE 0.8 MCG/KG/HR: 9 INJECTION, SOLUTION INTRAVENOUS at 17:40

## 2019-06-30 RX ADMIN — HEPARIN SODIUM 5000 UNITS: 5000 INJECTION INTRAVENOUS; SUBCUTANEOUS at 13:59

## 2019-06-30 RX ADMIN — Medication 1 SPRAY: at 21:20

## 2019-06-30 RX ADMIN — METRONIDAZOLE 500 MG: 500 INJECTION, SOLUTION INTRAVENOUS at 23:30

## 2019-06-30 RX ADMIN — ACETAMINOPHEN 650 MG: 160 SUSPENSION ORAL at 21:58

## 2019-06-30 RX ADMIN — Medication 1 SPRAY: at 17:40

## 2019-06-30 RX ADMIN — FENTANYL CITRATE 100 MCG/HR: 50 INJECTION, SOLUTION INTRAMUSCULAR; INTRAVENOUS at 22:00

## 2019-06-30 RX ADMIN — CEFEPIME HYDROCHLORIDE 2000 MG: 2 INJECTION, POWDER, FOR SOLUTION INTRAVENOUS at 21:20

## 2019-06-30 RX ADMIN — MIDAZOLAM 2 MG: 1 INJECTION INTRAMUSCULAR; INTRAVENOUS at 05:02

## 2019-06-30 RX ADMIN — ACETAMINOPHEN 650 MG: 160 SUSPENSION ORAL at 05:45

## 2019-06-30 RX ADMIN — MIDAZOLAM 2 MG: 1 INJECTION INTRAMUSCULAR; INTRAVENOUS at 08:54

## 2019-06-30 RX ADMIN — SODIUM CHLORIDE 0.7 MCG/KG/HR: 9 INJECTION, SOLUTION INTRAVENOUS at 08:41

## 2019-06-30 RX ADMIN — CEFEPIME HYDROCHLORIDE 2000 MG: 2 INJECTION, POWDER, FOR SOLUTION INTRAVENOUS at 09:20

## 2019-06-30 RX ADMIN — ACETAMINOPHEN 650 MG: 160 SUSPENSION ORAL at 13:58

## 2019-06-30 RX ADMIN — METRONIDAZOLE 500 MG: 500 INJECTION, SOLUTION INTRAVENOUS at 16:05

## 2019-06-30 RX ADMIN — METRONIDAZOLE 500 MG: 500 INJECTION, SOLUTION INTRAVENOUS at 00:35

## 2019-06-30 RX ADMIN — MIDAZOLAM 2 MG: 1 INJECTION INTRAMUSCULAR; INTRAVENOUS at 21:29

## 2019-06-30 RX ADMIN — FENTANYL CITRATE 100 MCG/HR: 50 INJECTION, SOLUTION INTRAMUSCULAR; INTRAVENOUS at 10:29

## 2019-06-30 RX ADMIN — CHLORHEXIDINE GLUCONATE 0.12% ORAL RINSE 15 ML: 1.2 LIQUID ORAL at 21:07

## 2019-06-30 RX ADMIN — MIDAZOLAM 2 MG: 1 INJECTION INTRAMUSCULAR; INTRAVENOUS at 17:56

## 2019-06-30 RX ADMIN — METRONIDAZOLE 500 MG: 500 INJECTION, SOLUTION INTRAVENOUS at 08:36

## 2019-06-30 RX ADMIN — HYDROMORPHONE HYDROCHLORIDE 1 MG: 1 INJECTION, SOLUTION INTRAMUSCULAR; INTRAVENOUS; SUBCUTANEOUS at 04:43

## 2019-06-30 RX ADMIN — VANCOMYCIN HYDROCHLORIDE 750 MG: 750 INJECTION, SOLUTION INTRAVENOUS at 03:01

## 2019-06-30 RX ADMIN — FENTANYL CITRATE 100 MCG/HR: 50 INJECTION, SOLUTION INTRAMUSCULAR; INTRAVENOUS at 01:11

## 2019-06-30 NOTE — PROGRESS NOTES
Vancomycin IV Pharmacy-to-Dose Consultation    Dallin Nadege is a 20 y o  female who is currently receiving Vancomycin IV with management by the Pharmacy Consult service  Relevant clinical data and objective history reviewed:  Temp Readings from Last 3 Encounters:   06/30/19 98 6 °F (37 °C)     BP (!) 85/52 (BP Location: Right arm)   Pulse 62   Temp 98 6 °F (37 °C)   Resp 12   Ht 5' 4" (1 626 m)   Wt 54 kg (119 lb)   SpO2 100%   BMI 20 43 kg/m²     I/O last 3 completed shifts: In: 8291 5 [I V :6211 5; Blood:350; NG/GT:100; IV Piggyback:1050; Feedings:580]  Out: 5475 [Urine:5475]    Lab Results   Component Value Date/Time    BUN 18 06/30/2019 05:01 AM    WBC 15 57 (H) 06/30/2019 05:01 AM    HGB 9 9 (L) 06/30/2019 05:01 AM    HCT 30 0 (L) 06/30/2019 05:01 AM    MCV 93 06/30/2019 05:01 AM     (L) 06/30/2019 05:01 AM     Creatinine   Date Value Ref Range Status   06/30/2019 0 67 0 60 - 1 30 mg/dL Final     Comment:     Standardized to IDMS reference method   06/29/2019 0 69 0 60 - 1 30 mg/dL Final     Comment:     Standardized to IDMS reference method         Vancomycin Assessment:  Indication: MRSA suspected, Pneumonia    Status: Low grade fever @ 4am; sedated; on vent  Micro: MRSA swab - pending; Strep pneumo urine antigen - negative; Legionella urine antigen - negative; Blood cx (6/29) -in process; Sputum cx (6/29) - gram stain - 4+ polys; 2+Mononuclear cells; no bacteria seen  Procalcitonin: 1 71 (6/29); Repeat procal ordered for Monday 6am  Renal Function: Scr stable; 2 39 mL/kg/hr  Potential Nephrotoxicity Factors:  Medications: None  Patient-Factors: None  Days of Therapy: 3  Current Dose: 750mg IV q8H - Last dose at 3am  Goal Trough: 15-20 (appropriate for most indications)   Goal AUC(24h): 400-600  Last Level: N/A        Vancomycin Plan:  New Dosing: Continue current dose of 750mg IV q8H  Predicted Trough / AUC: 14/536  Next Level:  Today, 6/30, @ 10:30am prior to 11:00am dose   Renal Function Monitoring: Will continue to monitor Scr, urine output, and addition of any nephrotoxic meds      Pharmacy will continue to follow closely for s/sx of nephrotoxicity, infusion reactions and appropriateness of therapy  BMP and CBC will be ordered per protocol  We will continue to follow the patient's culture results and clinical progress daily         207 Carmine Fleming - ID pharmacist

## 2019-06-30 NOTE — CONSULTS
Patient's vancomycin therapy has been discontinued  MRSA swab negative  Pharmacy will sign off now  Thank you for this consult

## 2019-06-30 NOTE — PROGRESS NOTES
Vancomycin Assessment    Michael Cerda is a 20 y o  female who is currently receiving vancomycin 750 mg q8h for MRSA suspected, Pneumonia     Relevant clinical data and objective history reviewed:  Creatinine   Date Value Ref Range Status   06/30/2019 0 67 0 60 - 1 30 mg/dL Final     Comment:     Standardized to IDMS reference method   06/29/2019 0 69 0 60 - 1 30 mg/dL Final     Comment:     Standardized to IDMS reference method   06/28/2019 1 05 0 60 - 1 30 mg/dL Final     Comment:     Standardized to IDMS reference method     BP (!) 85/52 (BP Location: Right arm)   Pulse 63   Temp 98 2 °F (36 8 °C)   Resp 17   Ht 5' 4" (1 626 m)   Wt 54 kg (119 lb)   SpO2 100%   BMI 20 43 kg/m²   I/O last 3 completed shifts: In: 8291 5 [I V :6211 5; Blood:350; NG/GT:100; IV Piggyback:1050; Feedings:580]  Out: 5475 [Urine:5475]  Lab Results   Component Value Date/Time    BUN 18 06/30/2019 05:01 AM    WBC 15 57 (H) 06/30/2019 05:01 AM    HGB 9 9 (L) 06/30/2019 05:01 AM    HCT 30 0 (L) 06/30/2019 05:01 AM    MCV 93 06/30/2019 05:01 AM     (L) 06/30/2019 05:01 AM     Temp Readings from Last 3 Encounters:   06/30/19 98 2 °F (36 8 °C)     Vancomycin Days of Therapy: 4    Assessment/Plan  The patient is currently on vancomycin utilizing scheduled dosing  The patient has no baseline risks associated with therapy  The patient is receiving 750 mg q8h with the most recent vancomycin level = 13 2 at steady-state and sub-therapeutic based on a goal of 15-20 (appropriate for most indications) ; therefore, after clinical evaluation will be changed to 1000 mg q8h   Pharmacy will continue to follow closely for s/sx of nephrotoxicity, infusion reactions and appropriateness of therapy  BMP and CBC will be ordered per protocol  Plan for trough as patient approaches steady state, prior to the 4th  dose at approximately 1630  Pharmacy will continue to follow the patients culture results and clinical progress daily      Hilario Nelson Jessica Pimentel, Pharmacist

## 2019-06-30 NOTE — NUTRITION
Replete Phosphorus  Recommend increase Jevity 1 2 as tolerated to goal rate of 65 ml/hr (no PROSOURCE) to provide qd: 1560 ml,1872 Kcal,87 gm PRO,264 gm CHO,61 gm Fat,1259 ml Free H2O,3 4 mg CHO/kg/min

## 2019-06-30 NOTE — RESPIRATORY THERAPY NOTE
RT Ventilator Management Note  Stefanie Giles 20 y o  female MRN: [id number]  Unit/Bed#: ICU 13 Encounter: [id number]      Daily Screen       6/29/2019  0800 6/30/2019  0736          Patient safety screen outcome[de-identified]  Failed  Failed      Not Ready for Weaning due to[de-identified]  PEEP > 8cmH2O;FiO2 >60%; Underline problem not resolved; Recieving paralytics  PEEP > 8cmH2O;FiO2 >60%; Underline problem not resolved              Physical Exam:   Assessment Type: Assess only  General Appearance: Sedated  Respiratory Pattern: Assisted  Chest Assessment: Chest expansion symmetrical  Bilateral Breath Sounds: Clear  O2 Device: vent      Resp Comments: Pt has clear bs  Sx not indicated  SBT not started due to pts critical condition, Peep,02 levels

## 2019-06-30 NOTE — PROGRESS NOTES
Progress Note - Critical Care   [facility] 20 y o  female MRN: [id number]  Unit/Bed#: ICU 13 Encounter: [id number]    Assessment: Patient is a 79-year-old female who presents after an elective procedure by [facility]  Patient had acute respiratory failure post-operatively requiring re-intubation due to ARDS likely 2/2 to aspiration  Patient also developed tachycardia, fever concerning for sepsis and was started on broad-spectrum antibiotics      Acute respiratory failure  ARDS  Maxillofacial surgery  Lactic acidosis-resolved  Aspiration   Acute Blood Loss Anemia     Plan:          Neuro:    no acute issues   following commands   GCS 10T (E3 V1 M6)    Analgesia: Tylenol 650 mg Q8H scheduled, Dilaudid 05/Dilaudid 1 PRN   sedation:  Fentanyl and precedex drips  Titrate to RASS -1   Versed 2 mg PRN for agitation                  CV:   Tachycardia-resolved  · Tachycardia improved with blood transfusion and fluid resuscitation yesterday  · Continue to monitor   Reduced EF   · Patient found to have reduced EF of 35-40% yesterday on ECHO  · Unknown etiology  · Likely had a role to play in acute respiratory failure--some factor of pulmonary edema  · Diurese patient?     MAPs > 65, has been achieving MAP goal without vasopressors                 Lung:   Acute Respiratory Failure/ARDS  ·  Patient re-intubated after acute respiratory failure on non-rebreather and HFNC   ·  Initialchest x-ray showed diffuse interstitial infiltrates suggestive of possible ARDS  ? per FS surgeon, patient aspirated a large amount of blood during extubation  · Repeat CXR shows improved upper lobe infiltrates with more defined RLL infiltrate consistent with aspiration   · Patient's vent settings have been weaned over past 24 hours to:  14/330/50/8  ? Wean vent as able   · Acute drop in PaO2 on most recent ABG, repeat later this morning  ? 7 334/68 1/45 4/23 6  ?  Repeat ABG later this morning  · PaO2/FiO2 136- consistent with moderate ARDS   Aspiration pneumonitis vs pneumonia  ·  patient with major aspiration event perioperatively and vomiting blood with possible aspiration postoperatively  · RLL infiltrate on CXR   ·  Afebrile over past 12 hours   ·  treated with broad-spectrum antibiotics including vancomycin, cefepime, Flagyl                 GI:   No acute issues   Nausea PRN: zofran 4 mg and reglan 10 mg   Bowel regimen: senna and colace   DC pepcid?                 FEN:   F: isolyte 75 ml/hr-DC? E: Replete as needed              Repleted P this morning  N:Tube feeds at goal                  :   No acute issues  Producing urine  Gomez in place for I's and O's                 ID:   Possible Aspiration Pneumonia  ·  febrile, tachycardic, tachypneic post-operatively   · RLL infiltrate on CXR sp aspiration   ·  vancomycin, cefepime, Flagyl  · Follow blood cultures and MRSA swab   · Procalcitonin positive   · Initial sputum gram stain negative                  Heme:   No further blood given over past 24 hours   DVT prophylaxis: start heparin subcu   SCDs on                  Endo:   Glucose slightly elevated at 169  Start on SSI?                            Msk/Skin:   No acute issues                 Disposition: ICU     Chief Complaint: NA    HPI/24hr events: No acute events  Patient required PRN sedation with versed and dilaudid  Physical Exam:   Physical Exam   Constitutional: She appears well-developed and well-nourished  No distress  HENT:   Head: Normocephalic  Eyes: Pupils are equal, round, and reactive to light  Neck: Normal range of motion  Neck supple  Cardiovascular: Normal rate, regular rhythm, normal heart sounds and intact distal pulses  Pulmonary/Chest: Effort normal    RLL crackles auscultated    Abdominal: Soft  Bowel sounds are normal  She exhibits no distension  There is no tenderness  There is no guarding  Abdomen is soft, nondistended, nontender  No rebound tenderness or guarding is noted    No masses palpated  Normal bowel sounds  Musculoskeletal: Normal range of motion  She exhibits no edema, tenderness or deformity  Neurological: She is alert  No cranial nerve deficit or sensory deficit  GCS 10 T (E3V1M6)  Follows commands in all 4 extremities    Skin: Skin is warm and dry  Capillary refill takes less than 2 seconds  Psychiatric: She has a normal mood and affect  Her behavior is normal  Judgment and thought content normal    Vitals reviewed  Vitals:    19 0100 19 0200 19 0300 19 0348   BP:       BP Location:       Pulse: (!) 54 58 74    Resp: 13 14 12    Temp: (!) 96 8 °F (36 °C) 97 5 °F (36 4 °C) 99 °F (37 2 °C)    TempSrc:  Probe Probe    SpO2: 100% 100% 100% 100%   Weight:       Height:         Arterial Line BP: 114/56  Arterial Line MAP (mmHg): 70 mmHg    Temperature:   Temp (24hrs), Av 3 °F (36 8 °C), Min:96 4 °F (35 8 °C), Max:100 4 °F (38 °C)    Current: Temperature: 99 °F (37 2 °C)    Weights:   IBW: 54 7 kg    Body mass index is 20 43 kg/m²    Weight (last 2 days)     Date/Time   Weight    19 0656   54 (119)              Hemodynamic Monitoring:  N/A     Non-Invasive/Invasive Ventilation Settings:  Respiratory    Lab Data (Last 4 hours)       0501            pH, Arterial       7 334           pCO2, Arterial       45 4           pO2, Arterial       68 1           HCO3, Arterial       23 6           Base Excess, Arterial       -2 3                O2/Vent Data        0348   Most Recent         Vent Mode AC/VC  AC/VC      Resp Rate (BPM) (BPM) 14  14      Vt (mL) (mL) 330  330      FIO2 (%) (%) 50  50      PEEP (cmH2O) (cmH2O) 8  8      MV 6 93  6 93                Lab Results   Component Value Date    PHART 7 334 (L) 2019    BIU7IAH 45 4 (H) 2019    PO2ART 68 1 (L) 2019    RDT6VZR 23 6 2019    BEART -2 3 2019    SOURCE Line, Arterial 2019     SpO2: SpO2: 100 %    Intake and Outputs:  I/O        0701 - 06/29 0700 06/29 0701 - 06/30 0700    I V  (mL/kg) 8820 9 (163 4) 2413 3 (44 7)    Blood 350     NG/GT 0 100    IV Piggyback 1600 300    Feedings  420    Total Intake(mL/kg) 41804 9 (199 5) 3233 3 (59 9)    Urine (mL/kg/hr) 2600 (2) 2975 (2 3)    Blood 350     Total Output 2950 2975    Net +7820 9 +258 3              UOP: 125 cc/hour   Nutrition:        Diet Orders   (From admission, onward)            Start     Ordered    06/29/19 0839  Diet Enteral/Parenteral; Tube Feeding No Oral Diet; Jevity 1 2 Yamil; Continuous; 46; Prosource Protein Liquid - One Packet  Diet effective now     Question Answer Comment   Diet Type Enteral/Parenteral    Enteral/Parenteral Tube Feeding No Oral Diet    Tube Feeding Formula: Jevity 1 2 Yamil    Bolus/Cyclic/Continuous Continuous    Tube Feeding Goal Rate (mL/hr): 46    Prosource Protein Liquid - No Carb Prosource Protein Liquid - One Packet    RD to adjust diet per protocol? No        06/29/19 0838        TF currently running at 46 /hour with a goal of 46   Formula:Jevity 1 2    Labs:   Results from last 7 days   Lab Units 06/30/19  0501 06/29/19  0439 06/29/19  0200   WBC Thousand/uL 15 57* 14 63* 14 65*   HEMOGLOBIN g/dL 9 9* 10 5* 10 5*   HEMATOCRIT % 30 0* 32 3* 31 9*   PLATELETS Thousands/uL 132* 140* 142*   NEUTROS PCT % 87* 82* 89*   MONOS PCT % 8 11 6    Results from last 7 days   Lab Units 06/30/19  0501 06/29/19  0439 06/28/19  2309 06/28/19  1731   SODIUM mmol/L 142 140  --  138   POTASSIUM mmol/L 4 0 4 6  --  3 9   CHLORIDE mmol/L 112* 112*  --  109*   CO2 mmol/L 26 23  --  19*   CO2, I-STAT mmol/L  --   --  22  --    BUN mg/dL 18 12  --  13   CREATININE mg/dL 0 67 0 69  --  1 05   CALCIUM mg/dL 8 0* 7 3*  --  7 5*   GLUCOSE, ISTAT mg/dl  --   --  157*  --      Results from last 7 days   Lab Units 06/30/19  0501   MAGNESIUM mg/dL 2 3     Results from last 7 days   Lab Units 06/30/19  0501   PHOSPHORUS mg/dL 1 1*          Results from last 7 days   Lab Units 06/29/19  0431 LACTIC ACID mmol/L 1 5     No results found for: TROPONINI    Imaging:   CXR with improved upper lobe infiltrates and more defined RLL infiltrate   I have personally reviewed pertinent reports        EKG: none new    Micro:  No results found for: Esther Torres, WOUNDCULT, SPUTUMCULTUR    Allergies: No Known Allergies    Medications:   Scheduled Meds:  Current Facility-Administered Medications:  acetaminophen 650 mg Oral Q8H Sarage Hand, MD    albuterol 2 5 mg Nebulization Q6H PRN Emi Hand, MD    cefepime 2,000 mg Intravenous Q12H Emi Bell MD Last Rate: 2,000 mg (06/29/19 2121)   chlorhexidine 15 mL Mouth/Throat Q12H [address] Kwaku Salvador PA-C    dexmedetomidine 0 1-0 7 mcg/kg/hr Intravenous Titrated Mirian Cramer MD Last Rate: 0 7 mcg/kg/hr (06/30/19 0455)   docusate sodium 100 mg Oral BID Midge Hand, MD    famotidine 20 mg Oral Q12H [address] Mirian Cramer MD    fentaNYL 100 mcg/hr Intravenous Continuous Kwaku Salvador PA-C Last Rate: 100 mcg/hr (06/30/19 0111)   heparin (porcine) 5,000 Units Subcutaneous [facility] Emi Bell MD    HYDROmorphone 0 5 mg Intravenous Q4H PRN Kwaku Salvador PA-C    HYDROmorphone 1 mg Intravenous Once Kwaku Salvador PA-C    HYDROmorphone 1 mg Intravenous Q4H PRN Kwaku Salvador PA-C    metoclopramide 10 mg Intravenous Q6H PRN Emi Hand, MD    metroNIDAZOLE 500 mg Intravenous Q8H mEi Bell MD Last Rate: Stopped (06/30/19 0100)   midazolam 2 mg Intravenous Q4H PRN Kwaku Salvador PA-C    multi-electrolyte 75 mL/hr Intravenous Continuous Emi Bell MD Last Rate: 75 mL/hr (06/29/19 1152)   ondansetron 4 mg Intravenous Q8H PRN Emi Bell MD    propofol 5-50 mcg/kg/min Intravenous Titrated Emi Bell MD Last Rate: Stopped (06/29/19 1633)   senna 1 tablet Oral HS Midge Hand, MD    sodium chloride 1 spray Each Nare 4x Daily Midge Hand, MD    vancomycin 15 mg/kg Intravenous Q8H Bethany GOLDBERG TAQUERIA [place] Last Rate: Stopped (06/30/19 0401)     Continuous Infusions:  dexmedetomidine 0 1-0 7 mcg/kg/hr Last Rate: 0 7 mcg/kg/hr (06/30/19 0455)   fentaNYL 100 mcg/hr Last Rate: 100 mcg/hr (06/30/19 0111)   multi-electrolyte 75 mL/hr Last Rate: 75 mL/hr (06/29/19 1152)   propofol 5-50 mcg/kg/min Last Rate: Stopped (06/29/19 1633)     PRN Meds:    albuterol 2 5 mg Q6H PRN   HYDROmorphone 0 5 mg Q4H PRN   HYDROmorphone 1 mg Q4H PRN   metoclopramide 10 mg Q6H PRN   midazolam 2 mg Q4H PRN   ondansetron 4 mg Q8H PRN       VTE Pharmacologic Prophylaxis: Heparin  VTE Mechanical Prophylaxis: sequential compression device    Invasive lines and devices: Invasive Devices     Peripheral Intravenous Line            Peripheral IV 06/28/19 Left Antecubital 1 day    Peripheral IV 06/30/19 Right;Ventral (anterior) Wrist less than 1 day          Arterial Line            Arterial Line 06/29/19 Radial 1 day          Drain            NG/OG/Enteral Tube Orogastric Center mouth 1 day    Urethral Catheter Temperature probe 16 Fr  1 day          Airway            ETT  Hi-Lo; Cuffed;Oral 7 mm 1 day                   Counseling / Coordination of Care  Total Critical Care time spent 48 minutes excluding procedures, teaching and family updates  Code Status: Level 1 - Full Code     Portions of the record may have been created with voice recognition software  Occasional wrong word or "sound a like" substitutions may have occurred due to the inherent limitations of voice recognition software  Read the chart carefully and recognize, using context, where substitutions have occurred       Sadi Ramos MD

## 2019-06-30 NOTE — SOCIAL WORK
Patient is intubated and unable to provide Case Management open assessment information  Telephone call to emergency contact in chart Darlene Carr at [phone] however voicemail box was full  CM will continue to try and reach family, to complete open assessment  12:40pm Telephone call placed to patient's mother  CM explained role of CM and assessment obtained from patient's mother  Patient resides with her mom in a 2 story home  There are approximately 3 steps to enter the house  Patients bedroom is on the second floor however upon discharge patient will be staying on the first floor  Prior to arrival patient independent in all ADLS  Patient has never used homecare services or SNF  Patient uses [facility]  Case management will continue to follow for all discharge planning needs

## 2019-06-30 NOTE — PROCEDURES
Central Line Insertion  Date/Time: 6/30/2019 10:21 AM  Performed by: Becca Correa MD  Authorized by: Jem Gruber MD     Patient location:  Bedside  Other Assisting Provider: Yes (comment) Dean Odell    Consent:     Consent obtained:  Verbal    Consent given by:  Parent    Risks discussed:  Arterial puncture, bleeding, infection, incorrect placement and pneumothorax    Alternatives discussed:  No treatment and delayed treatment  Universal protocol:     Procedure explained and questions answered to patient or proxy's satisfaction: yes      Relevant documents present and verified: yes      Test results available and properly labeled: yes      Radiology Images displayed and confirmed  If images not available, report reviewed: yes      Required blood products, implants, devices, and special equipment available: yes      Site/side marked: yes      Immediately prior to procedure, a time out was called: yes      Patient identity confirmed:  Arm band  Pre-procedure details:     Hand hygiene: Hand hygiene performed prior to insertion      Sterile barrier technique: All elements of maximal sterile technique followed      Skin preparation:  2% chlorhexidine    Skin preparation agent: Skin preparation agent completely dried prior to procedure    Indications:     Central line indications: no peripheral vascular access    Sedation:     Sedation type: Moderate (conscious) sedation  Anesthesia (see MAR for exact dosages):      Anesthesia method:  Local infiltration    Local anesthetic:  Lidocaine 1% w/o epi  Procedure details:     Location:  Right internal jugular    Vessel type: vein      Laterality:  Right    Approach: percutaneous technique used      Patient position:  Flat    Catheter type:  Triple lumen 20cm    Catheter size:  7 Fr    Landmarks identified: yes      Ultrasound guidance: yes      Sterile ultrasound techniques: Sterile gel and sterile probe covers were used      Number of attempts:  1    Successful placement: yes    Post-procedure details:     Post-procedure:  Dressing applied and line sutured    Assessment:  Blood return through all ports and free fluid flow    Post-procedure complications: none      Patient tolerance of procedure:   Tolerated well, no immediate complications

## 2019-06-30 NOTE — RESPIRATORY THERAPY NOTE
RT Ventilator Management Note  Jerrel Buerger 20 y o  female MRN: [id number]  Unit/Bed#: ICU 13 Encounter: [id number]      Daily Screen       6/29/2019  0800             Patient safety screen outcome[de-identified]  Failed    Not Ready for Weaning due to[de-identified]  PEEP > 8cmH2O;FiO2 >60%; Underline problem not resolved; Recieving paralytics            Physical Exam:   Assessment Type: Assess only  General Appearance: Sedated  Respiratory Pattern: Assisted  Chest Assessment: Chest expansion symmetrical  Bilateral Breath Sounds: Clear  O2 Device: vent      Resp Comments: pt remained on AC/VC mode/settings all evening/night   No issues while on vent

## 2019-06-30 NOTE — PROGRESS NOTES
[facility] Progress Note  POD#2     19 y/o F s/p elective bimaxillary surgery  No acute events overnight  Pt remains intubated, on vents, sedated, on tube feeds  Is CVS stable  Her chest x-ray shows improvement of the B/L infiltrate  Echo study came normal, however lower ejection fx was noted  I performed oral care      P/E  GUS: moderate swelling to midface  Nose: dried blood to nostrils, no active bleeding  Mouth: oral intubation with tube on midline, NG tube in place  Upper occlusal splint in place  Sutures to upper/lower vestibule intact and hemostatic  No wound dehiscence noted  Neck: moderate swelling to lower face, as expected      CVS: RRR, S1/S2  CHEST: CTA B/L  ABD: +BS   A-line in place  Gomez with clear urine production     Labs, ABG were reviewed    A: s/p bimaxillary jaw surgery, improving ARDS     Recommendations:  Continue antibiotics (Ancef)   Maintain adequate sedation while patient intubated in order to prevent biting on the tube- which could affect the surgical healing of the jaws  Tube feeds as pt will remains intubated  Vaseline to lips  HOB 30  DVT prophylaxis  Overall management as per ICU team  Oral care by Anthony Tobar if patient extubated - or if any issue

## 2019-07-01 LAB
ABO GROUP BLD BPU: NORMAL
ABO GROUP BLD BPU: NORMAL
ANION GAP SERPL CALCULATED.3IONS-SCNC: 2 MMOL/L (ref 4–13)
ATRIAL RATE: 57 BPM
BACTERIA SPT RESP CULT: NO GROWTH
BPU ID: NORMAL
BPU ID: NORMAL
BUN SERPL-MCNC: 19 MG/DL (ref 5–25)
CA-I BLD-SCNC: 1.13 MMOL/L (ref 1.12–1.32)
CALCIUM SERPL-MCNC: 7.8 MG/DL (ref 8.3–10.1)
CHLORIDE SERPL-SCNC: 111 MMOL/L (ref 100–108)
CO2 SERPL-SCNC: 29 MMOL/L (ref 21–32)
CREAT SERPL-MCNC: 0.57 MG/DL (ref 0.6–1.3)
CROSSMATCH: NORMAL
CROSSMATCH: NORMAL
GFR SERPL CREATININE-BSD FRML MDRD: 134 ML/MIN/1.73SQ M
GLUCOSE SERPL-MCNC: 134 MG/DL (ref 65–140)
GRAM STN SPEC: NORMAL
MAGNESIUM SERPL-MCNC: 2.2 MG/DL (ref 1.6–2.6)
P AXIS: 66 DEGREES
PHOSPHATE SERPL-MCNC: 1.6 MG/DL (ref 2.7–4.5)
PHOSPHATE SERPL-MCNC: 3.2 MG/DL (ref 2.7–4.5)
POTASSIUM SERPL-SCNC: 3.8 MMOL/L (ref 3.5–5.3)
PR INTERVAL: 146 MS
PROCALCITONIN SERPL-MCNC: 0.87 NG/ML
QRS AXIS: 79 DEGREES
QRSD INTERVAL: 92 MS
QT INTERVAL: 425 MS
QTC INTERVAL: 414 MS
SODIUM SERPL-SCNC: 142 MMOL/L (ref 136–145)
T WAVE AXIS: 92 DEGREES
UNIT DISPENSE STATUS: NORMAL
UNIT DISPENSE STATUS: NORMAL
UNIT PRODUCT CODE: NORMAL
UNIT PRODUCT CODE: NORMAL
UNIT RH: NORMAL
UNIT RH: NORMAL
VENTRICULAR RATE: 57 BPM

## 2019-07-01 PROCEDURE — 82330 ASSAY OF CALCIUM: CPT | Performed by: SURGERY

## 2019-07-01 PROCEDURE — 84100 ASSAY OF PHOSPHORUS: CPT | Performed by: PHYSICIAN ASSISTANT

## 2019-07-01 PROCEDURE — 80048 BASIC METABOLIC PNL TOTAL CA: CPT | Performed by: SURGERY

## 2019-07-01 PROCEDURE — 84145 PROCALCITONIN (PCT): CPT | Performed by: PHYSICIAN ASSISTANT

## 2019-07-01 PROCEDURE — 93010 ELECTROCARDIOGRAM REPORT: CPT | Performed by: INTERNAL MEDICINE

## 2019-07-01 PROCEDURE — 83735 ASSAY OF MAGNESIUM: CPT | Performed by: SURGERY

## 2019-07-01 PROCEDURE — 94003 VENT MGMT INPAT SUBQ DAY: CPT

## 2019-07-01 PROCEDURE — 99291 CRITICAL CARE FIRST HOUR: CPT | Performed by: STUDENT IN AN ORGANIZED HEALTH CARE EDUCATION/TRAINING PROGRAM

## 2019-07-01 PROCEDURE — 84100 ASSAY OF PHOSPHORUS: CPT | Performed by: SURGERY

## 2019-07-01 PROCEDURE — 94760 N-INVAS EAR/PLS OXIMETRY 1: CPT

## 2019-07-01 RX ORDER — FUROSEMIDE 10 MG/ML
20 INJECTION INTRAMUSCULAR; INTRAVENOUS ONCE
Status: COMPLETED | OUTPATIENT
Start: 2019-07-01 | End: 2019-07-01

## 2019-07-01 RX ORDER — DEXAMETHASONE SODIUM PHOSPHATE 4 MG/ML
10 INJECTION, SOLUTION INTRA-ARTICULAR; INTRALESIONAL; INTRAMUSCULAR; INTRAVENOUS; SOFT TISSUE EVERY 6 HOURS SCHEDULED
Status: COMPLETED | OUTPATIENT
Start: 2019-07-01 | End: 2019-07-02

## 2019-07-01 RX ADMIN — HEPARIN SODIUM 5000 UNITS: 5000 INJECTION INTRAVENOUS; SUBCUTANEOUS at 05:55

## 2019-07-01 RX ADMIN — POTASSIUM PHOSPHATE, MONOBASIC AND POTASSIUM PHOSPHATE, DIBASIC 30 MMOL: 224; 236 INJECTION, SOLUTION INTRAVENOUS at 07:54

## 2019-07-01 RX ADMIN — MIDAZOLAM 2 MG: 1 INJECTION INTRAMUSCULAR; INTRAVENOUS at 04:30

## 2019-07-01 RX ADMIN — CEFEPIME HYDROCHLORIDE 2000 MG: 2 INJECTION, POWDER, FOR SOLUTION INTRAVENOUS at 21:35

## 2019-07-01 RX ADMIN — MIDAZOLAM 2 MG: 1 INJECTION INTRAMUSCULAR; INTRAVENOUS at 11:55

## 2019-07-01 RX ADMIN — MIDAZOLAM 2 MG: 1 INJECTION INTRAMUSCULAR; INTRAVENOUS at 06:30

## 2019-07-01 RX ADMIN — FUROSEMIDE 20 MG: 10 INJECTION, SOLUTION INTRAMUSCULAR; INTRAVENOUS at 10:57

## 2019-07-01 RX ADMIN — FENTANYL CITRATE 50 MCG/HR: 50 INJECTION, SOLUTION INTRAMUSCULAR; INTRAVENOUS at 22:25

## 2019-07-01 RX ADMIN — SENNOSIDES 8.6 MG: 8.6 TABLET, FILM COATED ORAL at 21:36

## 2019-07-01 RX ADMIN — ACETAMINOPHEN 650 MG: 160 SUSPENSION ORAL at 14:55

## 2019-07-01 RX ADMIN — Medication 1 SPRAY: at 21:37

## 2019-07-01 RX ADMIN — Medication 1 SPRAY: at 13:00

## 2019-07-01 RX ADMIN — CHLORHEXIDINE GLUCONATE 0.12% ORAL RINSE 15 ML: 1.2 LIQUID ORAL at 20:56

## 2019-07-01 RX ADMIN — ACETAMINOPHEN 650 MG: 160 SUSPENSION ORAL at 05:55

## 2019-07-01 RX ADMIN — METRONIDAZOLE 500 MG: 500 INJECTION, SOLUTION INTRAVENOUS at 15:59

## 2019-07-01 RX ADMIN — MIDAZOLAM 2 MG: 1 INJECTION INTRAMUSCULAR; INTRAVENOUS at 21:30

## 2019-07-01 RX ADMIN — MIDAZOLAM 2 MG: 1 INJECTION INTRAMUSCULAR; INTRAVENOUS at 23:30

## 2019-07-01 RX ADMIN — DEXAMETHASONE SODIUM PHOSPHATE 10 MG: 4 INJECTION, SOLUTION INTRAMUSCULAR; INTRAVENOUS at 11:32

## 2019-07-01 RX ADMIN — MIDAZOLAM 2 MG: 1 INJECTION INTRAMUSCULAR; INTRAVENOUS at 00:43

## 2019-07-01 RX ADMIN — SODIUM CHLORIDE 1.4 MCG/KG/HR: 9 INJECTION, SOLUTION INTRAVENOUS at 12:03

## 2019-07-01 RX ADMIN — MIDAZOLAM 2 MG: 1 INJECTION INTRAMUSCULAR; INTRAVENOUS at 15:46

## 2019-07-01 RX ADMIN — Medication 1 SPRAY: at 17:36

## 2019-07-01 RX ADMIN — SODIUM CHLORIDE 1.4 MCG/KG/HR: 9 INJECTION, SOLUTION INTRAVENOUS at 17:41

## 2019-07-01 RX ADMIN — DEXAMETHASONE SODIUM PHOSPHATE 10 MG: 4 INJECTION, SOLUTION INTRAMUSCULAR; INTRAVENOUS at 17:35

## 2019-07-01 RX ADMIN — METRONIDAZOLE 500 MG: 500 INJECTION, SOLUTION INTRAVENOUS at 09:42

## 2019-07-01 RX ADMIN — SODIUM CHLORIDE 1.4 MCG/KG/HR: 9 INJECTION, SOLUTION INTRAVENOUS at 23:26

## 2019-07-01 RX ADMIN — METRONIDAZOLE 500 MG: 500 INJECTION, SOLUTION INTRAVENOUS at 23:25

## 2019-07-01 RX ADMIN — ACETAMINOPHEN 650 MG: 160 SUSPENSION ORAL at 21:49

## 2019-07-01 RX ADMIN — FENTANYL CITRATE 100 MCG/HR: 50 INJECTION, SOLUTION INTRAMUSCULAR; INTRAVENOUS at 05:59

## 2019-07-01 RX ADMIN — DEXAMETHASONE SODIUM PHOSPHATE 10 MG: 4 INJECTION, SOLUTION INTRAMUSCULAR; INTRAVENOUS at 23:25

## 2019-07-01 RX ADMIN — MIDAZOLAM 2 MG: 1 INJECTION INTRAMUSCULAR; INTRAVENOUS at 19:00

## 2019-07-01 RX ADMIN — CHLORHEXIDINE GLUCONATE 0.12% ORAL RINSE 15 ML: 1.2 LIQUID ORAL at 09:42

## 2019-07-01 RX ADMIN — CEFEPIME HYDROCHLORIDE 2000 MG: 2 INJECTION, POWDER, FOR SOLUTION INTRAVENOUS at 09:43

## 2019-07-01 RX ADMIN — Medication 1 SPRAY: at 09:43

## 2019-07-01 RX ADMIN — HEPARIN SODIUM 5000 UNITS: 5000 INJECTION INTRAVENOUS; SUBCUTANEOUS at 21:36

## 2019-07-01 RX ADMIN — HEPARIN SODIUM 5000 UNITS: 5000 INJECTION INTRAVENOUS; SUBCUTANEOUS at 14:55

## 2019-07-01 RX ADMIN — MIDAZOLAM 2 MG: 1 INJECTION INTRAMUSCULAR; INTRAVENOUS at 02:30

## 2019-07-01 RX ADMIN — SODIUM CHLORIDE 1.4 MCG/KG/HR: 9 INJECTION, SOLUTION INTRAVENOUS at 05:54

## 2019-07-01 NOTE — RESPIRATORY THERAPY NOTE
RT Ventilator Management Note  Uyen Chi 20 y o  female MRN: [id number]  Unit/Bed#: ICU 13 Encounter: [id number]      Daily Screen       6/30/2019  0736 6/30/2019  1137          Patient safety screen outcome[de-identified]  Failed  Passed      Not Ready for Weaning due to[de-identified]  PEEP > 8cmH2O;FiO2 >60%; Underline problem not resolved                Physical Exam:   Assessment Type: Assess only  General Appearance: Sedated  Respiratory Pattern: Assisted  Chest Assessment: Chest expansion symmetrical  Bilateral Breath Sounds: Clear  O2 Device: vent      Resp Comments: Pt continues on AC/VC settings  No resp distress noted  Pt was not trialed on wean at this time  Spoke with nurse to coordinate a good time to lower sedation to trial wean  No changes made to vent settings at this time  Will continue to monitor per resp protocol

## 2019-07-01 NOTE — RESPIRATORY THERAPY NOTE
RT Ventilator Management Note  Alexander Navarro 20 y o  female MRN: [id number]  Unit/Bed#: ICU 13 Encounter: [id number]      Daily Screen       6/30/2019  1137 7/1/2019  1108          Patient safety screen outcome[de-identified]  Passed  Failed      Not Ready for Weaning due to[de-identified]    Underline problem not resolved;Poor inspiratory effort              Physical Exam:   Assessment Type: (P) Assess only  General Appearance: (P) Drowsy  Respiratory Pattern: (P) Assisted  Chest Assessment: (P) Chest expansion symmetrical  Bilateral Breath Sounds: (P) Clear  O2 Device: vent      Resp Comments: (P) Pt was trialed on PSV settings but failed due to decreased Vt and RR  Pt was placed back on AC/VC settings  A cuff leak was tested at this time and pt had no volume loss  MD and RN are aware  Will continue to monitor per resp protocol

## 2019-07-01 NOTE — PROGRESS NOTES
[facility] Progress Note  POD#3     21 y/o F s/p elective bimaxillary surgery  Chest DX shows significant improvement  Pt was remained intubated, on pressure support, stable  The respiratory plan is weaning for possible extubation today  She had IJ line placed yesterday due to difficulty to maintain peripheral IVs patency        P/E  GUS: moderate swelling to midface  Nose: dried blood to nostrils, no active bleeding  Mouth: oral intubation with tube on midline, NG tube in place  Upper occlusal splint in place  Sutures to upper/lower vestibule intact and hemostatic  No wound dehiscence noted  Neck: moderate swelling to lower face, as expected      CVS: RRR, S1/S2  CHEST: CTA B/L  ABD: +BS   A-line in place  Gomez with clear urine production        A: s/p bimaxillary jaw surgery, complicated by ARDS, possible extubation today      Recommendations:    Continue antibiotics (Ancef)   Tube feeds while intubated  Vaseline to lips  HOB 30  DVT prophylaxis  Keep patient comfortable while intubated in order to prevent biting on the tube- which could affect the surgical healing of the jaws  Once extubated, she should not clench her jaws    Contact Dr Bunny Olivera if patient extubated - or if any issue  Overall management as per ICU team  Oral care by [facility]

## 2019-07-01 NOTE — PROGRESS NOTES
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, maxillary hypoplasia, and malocclusion s/p bimaxillary surgery    Plan:      Neuro: Intubated and sedated   - precedex and fentanyl gtt   - scheduled tylenol   - dilaudid/versed prn                 CV:    - reduced EF of echo, uncertain etiology   - s/p lasix 20 IV x1   - recheck echo when extubated                 Lung:    Acute hypoxic respiratory failure, likely secondary to aspiration   - AC 14/330/50%/8   - wean to extubate today                 GI: Tube feeds at goal   - continue Jevity 1 2 @ 46   - senna/colace                 FEN: s/p lasix 20mg IV x1 dose   - repeat today   - replete phosphorus                 : Gomez in place   - discontinue for extubation                 ID: Cefepime/Flagyl   - abx day 3   - finish abx tomorrow                 Heme: Heparin ppx                 Endo: No issues                            Msk/Skin:    - turning and repositioning   - oral care per OMS                 Disposition: ICU    Chief Complaint: N/a    HPI/24hr events: no acute events      Physical Exam:   General: NAD, intubated and sedated  Eyes: open to voice  ENT: moist mucous membranes  Mild facial edema  Neck: supple  RIJ triple lumen catheter in place  CV: RRR +S1/S2  Chest: breath sounds bilaterally  Abdomen: soft, NT ND  Gomez in place  Extremities: atraumatic      Vitals:    19 0415 19 0500 19 0552 19 0600   BP:       BP Location:       Pulse: 57 58  60   Resp:  13  14   Temp:  98 6 °F (37 °C)  98 6 °F (37 °C)   TempSrc:       SpO2: 100% 100%  100%   Weight:   65 4 kg (144 lb 2 9 oz)    Height:         Arterial Line BP: 122/60  Arterial Line MAP (mmHg): 80 mmHg    Temperature:   Temp (24hrs), Av 2 °F (36 8 °C), Min:97 5 °F (36 4 °C), Max:98 6 °F (37 °C)    Current: Temperature: 98 6 °F (37 °C)    Weights:   IBW: 54 7 kg    Body mass index is 24 75 kg/m²  Weight (last 2 days)     Date/Time   Weight    07/01/19 0552   65 4 (144 18)    06/30/19 1327   54 (119 05)              Hemodynamic Monitoring:  N/A     Non-Invasive/Invasive Ventilation Settings:  Respiratory    Lab Data (Last 4 hours)    None         O2/Vent Data (Last 4 hours)      07/01 0415           Vent Mode AC/VC       Resp Rate (BPM) (BPM) 14       Vt (mL) (mL) 330       FIO2 (%) (%) 50       PEEP (cmH2O) (cmH2O) 8       MV 5 5                 Lab Results   Component Value Date    PHART 7 367 06/30/2019    QSA4YHM 43 0 06/30/2019    PO2ART 205 0 (H) 06/30/2019    YGC1ONM 24 1 06/30/2019    BEART -1 2 06/30/2019    SOURCE Line, Arterial 06/30/2019     SpO2: SpO2: 100 %    Intake and Outputs:  I/O       06/29 0701 - 06/30 0700 06/30 0701 - 07/01 0700 07/01 0701 - 07/02 0700    I V  (mL/kg) 2690 6 (49 8) 934 4 (14 3)     Blood       NG/ 120     IV Piggyback 450 400     Feedings 580 1104     Total Intake(mL/kg) 3820 6 (70 8) 2558 4 (39 1)     Urine (mL/kg/hr) 3100 (2 4) 2415 (1 5)     Blood       Total Output 3100 2415     Net +720 6 +143 4                UOP: 1 5/hour   Nutrition:        Diet Orders   (From admission, onward)            Start     Ordered    06/29/19 0839  Diet Enteral/Parenteral; Tube Feeding No Oral Diet; Jevity 1 2 Yamil; Continuous; 46; Prosource Protein Liquid - One Packet  Diet effective now     Question Answer Comment   Diet Type Enteral/Parenteral    Enteral/Parenteral Tube Feeding No Oral Diet    Tube Feeding Formula: Jevity 1 2 Yamil    Bolus/Cyclic/Continuous Continuous    Tube Feeding Goal Rate (mL/hr): 46    Prosource Protein Liquid - No Carb Prosource Protein Liquid - One Packet    RD to adjust diet per protocol? No        06/29/19 0838        TF currently running at 46/hour with a goal of 46   Formula:Jev 1 2    Labs:   Results from last 7 days   Lab Units 06/30/19  0501 06/29/19  0439 06/29/19  0200   WBC Thousand/uL 15 57* 14 63* 14 65*   HEMOGLOBIN g/dL 9 9* 10 5* 10 5*   HEMATOCRIT % 30 0* 32 3* 31 9*   PLATELETS Thousands/uL 132* 140* 142*   NEUTROS PCT % 87* 82* 89*   MONOS PCT % 8 11 6    Results from last 7 days   Lab Units 07/01/19  0433 06/30/19  0501 06/29/19  0439 06/28/19  2309   SODIUM mmol/L 142 142 140  --    POTASSIUM mmol/L 3 8 4 0 4 6  --    CHLORIDE mmol/L 111* 112* 112*  --    CO2 mmol/L 29 26 23  --    CO2, I-STAT mmol/L  --   --   --  22   BUN mg/dL 19 18 12  --    CREATININE mg/dL 0 57* 0 67 0 69  --    CALCIUM mg/dL 7 8* 8 0* 7 3*  --    GLUCOSE, ISTAT mg/dl  --   --   --  157*     Results from last 7 days   Lab Units 07/01/19  0433 06/30/19  0501   MAGNESIUM mg/dL 2 2 2 3     Results from last 7 days   Lab Units 07/01/19  0433 06/30/19  0501   PHOSPHORUS mg/dL 1 6* 1 1*          Results from last 7 days   Lab Units 06/29/19  0439   LACTIC ACID mmol/L 1 5     No results found for: TROPONINI    Imaging: I have personally reviewed pertinent reports        EKG: n/a    Micro:  Lab Results   Component Value Date    BLOODCX No Growth at 24 hrs  06/29/2019    BLOODCX No Growth at 24 hrs  06/29/2019    SPUTUMCULTUR No growth 06/30/2019       Allergies: No Known Allergies    Medications:   Scheduled Meds:  Current Facility-Administered Medications:  acetaminophen 650 mg Oral Q8H Suni Alvarado MD    albuterol 2 5 mg Nebulization Q6H PRN Suni Alvarado MD    cefepime 2,000 mg Intravenous Q12H Suni Alvarado MD Last Rate: 2,000 mg (06/30/19 2120)   chlorhexidine 15 mL Mouth/Throat Q12H [address] Jay Chino PA-C    dexmedetomidine 1 4 mcg/kg/hr Intravenous Titrated Nolan Medina PA-C Last Rate: 1 4 mcg/kg/hr (07/01/19 0554)   docusate sodium 100 mg Oral BID Suni Alvarado MD    fentaNYL 100 mcg/hr Intravenous Continuous Josselyn Robles PA-C Last Rate: 100 mcg/hr (07/01/19 0559)   heparin (porcine) 5,000 Units Subcutaneous Q8H [address] Suni Alvarado MD    HYDROmorphone 0 5 mg Intravenous Q4H PRN Lili Self TAQUERIA Chino    HYDROmorphone 1 mg Intravenous Once Bj Cruz PA-C    HYDROmorphone 1 mg Intravenous Q4H PRN Bj Cruz PA-C    metoclopramide 10 mg Intravenous Q6H PRN Rodo Sloan MD    metroNIDAZOLE 500 mg Intravenous Q8H Rodo Sloan MD Last Rate: Stopped (07/01/19 0000)   midazolam 2 mg Intravenous Q2H PRN Rodo Sloan MD    ondansetron 4 mg Intravenous Q8H PRN Rodo Sloan MD    senna 1 tablet Oral HS Rodo Sloan MD    sodium chloride 1 spray Each Nare 4x Daily Rodo Sloan MD      Continuous Infusions:  dexmedetomidine 1 4 mcg/kg/hr Last Rate: 1 4 mcg/kg/hr (07/01/19 0554)   fentaNYL 100 mcg/hr Last Rate: 100 mcg/hr (07/01/19 0559)     PRN Meds:    albuterol 2 5 mg Q6H PRN   HYDROmorphone 0 5 mg Q4H PRN   HYDROmorphone 1 mg Q4H PRN   metoclopramide 10 mg Q6H PRN   midazolam 2 mg Q2H PRN   ondansetron 4 mg Q8H PRN       VTE Pharmacologic Prophylaxis: Heparin  VTE Mechanical Prophylaxis: sequential compression device    Invasive lines and devices: Invasive Devices     Central Venous Catheter Line            CVC Central Lines 06/30/19 Triple 20cm less than 1 day          Arterial Line            Arterial Line 06/29/19 Radial 2 days          Drain            NG/OG/Enteral Tube Orogastric Center mouth 2 days    Urethral Catheter Temperature probe 16 Fr  2 days          Airway            ETT  Hi-Lo; Cuffed;Oral 7 mm 2 days                   Counseling / Coordination of Care  Total Critical Care time spent 30 minutes excluding procedures, teaching and family updates  Code Status: Level 1 - Full Code     Portions of the record may have been created with voice recognition software  Occasional wrong word or "sound a like" substitutions may have occurred due to the inherent limitations of voice recognition software  Read the chart carefully and recognize, using context, where substitutions have occurred       Freya Landry MD

## 2019-07-01 NOTE — RESPIRATORY THERAPY NOTE
RT Ventilator Management Note  Becki Rosales 20 y o  female MRN: [id number]  Unit/Bed#: ICU 13 Encounter: [id number]      Daily Screen       6/30/2019  1137 7/1/2019  1108          Patient safety screen outcome[de-identified]  Passed  Failed      Not Ready for Weaning due to[de-identified]    Underline problem not resolved;Poor inspiratory effort              Physical Exam:   Assessment Type: Assess only  General Appearance: Drowsy  Respiratory Pattern: Normal, Assisted  Chest Assessment: Chest expansion symmetrical  Bilateral Breath Sounds: Clear  R Breath Sounds: Clear  L Breath Sounds: Clear  Cough: None  Suction: ET Tube  O2 Device: ventilator      Resp Comments: Pt tolerating current vent settings and appears to be resting comfortably  No vent changes at this time  Will continue to monitor and assess per respiratory protocol

## 2019-07-01 NOTE — PROGRESS NOTES
md assesses pt  Plan of care discussed   Cuff leak test completed by respiratory personnel and no leak noted and reported to md  Lasix and iv steroids given as ordered

## 2019-07-02 ENCOUNTER — APPOINTMENT (INPATIENT)
Dept: RADIOLOGY | Facility: HOSPITAL | Age: 19
DRG: 089 | End: 2019-07-02
Payer: COMMERCIAL

## 2019-07-02 PROBLEM — J80 ARDS (ADULT RESPIRATORY DISTRESS SYNDROME) (HCC): Status: RESOLVED | Noted: 2019-06-29 | Resolved: 2019-07-02

## 2019-07-02 PROBLEM — J96.00 ACUTE RESPIRATORY FAILURE (HCC): Status: RESOLVED | Noted: 2019-06-29 | Resolved: 2019-07-02

## 2019-07-02 PROBLEM — E87.2 LACTIC ACIDOSIS: Status: RESOLVED | Noted: 2019-06-29 | Resolved: 2019-07-02

## 2019-07-02 PROCEDURE — 99232 SBSQ HOSP IP/OBS MODERATE 35: CPT | Performed by: STUDENT IN AN ORGANIZED HEALTH CARE EDUCATION/TRAINING PROGRAM

## 2019-07-02 PROCEDURE — 94760 N-INVAS EAR/PLS OXIMETRY 1: CPT

## 2019-07-02 PROCEDURE — 71045 X-RAY EXAM CHEST 1 VIEW: CPT

## 2019-07-02 RX ORDER — OXYCODONE HCL 5 MG/5 ML
10 SOLUTION, ORAL ORAL EVERY 4 HOURS PRN
Status: DISCONTINUED | OUTPATIENT
Start: 2019-07-02 | End: 2019-07-02

## 2019-07-02 RX ORDER — SODIUM CHLORIDE, SODIUM GLUCONATE, SODIUM ACETATE, POTASSIUM CHLORIDE, MAGNESIUM CHLORIDE, SODIUM PHOSPHATE, DIBASIC, AND POTASSIUM PHOSPHATE .53; .5; .37; .037; .03; .012; .00082 G/100ML; G/100ML; G/100ML; G/100ML; G/100ML; G/100ML; G/100ML
500 INJECTION, SOLUTION INTRAVENOUS ONCE
Status: COMPLETED | OUTPATIENT
Start: 2019-07-02 | End: 2019-07-03

## 2019-07-02 RX ORDER — CHLORHEXIDINE GLUCONATE 0.12 MG/ML
30 RINSE ORAL
Status: DISCONTINUED | OUTPATIENT
Start: 2019-07-02 | End: 2019-07-08 | Stop reason: HOSPADM

## 2019-07-02 RX ORDER — ACETAMINOPHEN 160 MG/5ML
20 SOLUTION ORAL EVERY 4 HOURS PRN
Qty: 473 ML | Refills: 1 | Status: SHIPPED | OUTPATIENT
Start: 2019-07-02 | End: 2019-07-08 | Stop reason: HOSPADM

## 2019-07-02 RX ORDER — CEFAZOLIN SODIUM 1 G/50ML
1000 SOLUTION INTRAVENOUS EVERY 8 HOURS
Status: DISCONTINUED | OUTPATIENT
Start: 2019-07-02 | End: 2019-07-02

## 2019-07-02 RX ORDER — HYDROMORPHONE HCL/PF 1 MG/ML
1 SYRINGE (ML) INJECTION EVERY 4 HOURS PRN
Status: DISCONTINUED | OUTPATIENT
Start: 2019-07-02 | End: 2019-07-02

## 2019-07-02 RX ORDER — SODIUM CHLORIDE, SODIUM GLUCONATE, SODIUM ACETATE, POTASSIUM CHLORIDE, MAGNESIUM CHLORIDE, SODIUM PHOSPHATE, DIBASIC, AND POTASSIUM PHOSPHATE .53; .5; .37; .037; .03; .012; .00082 G/100ML; G/100ML; G/100ML; G/100ML; G/100ML; G/100ML; G/100ML
500 INJECTION, SOLUTION INTRAVENOUS ONCE
Status: COMPLETED | OUTPATIENT
Start: 2019-07-02 | End: 2019-07-02

## 2019-07-02 RX ORDER — OXYCODONE HCL 5 MG/5 ML
5 SOLUTION, ORAL ORAL EVERY 4 HOURS PRN
Qty: 150 ML | Refills: 0 | Status: SHIPPED | OUTPATIENT
Start: 2019-07-02 | End: 2019-07-07 | Stop reason: HOSPADM

## 2019-07-02 RX ORDER — FENTANYL CITRATE 50 UG/ML
50 INJECTION, SOLUTION INTRAMUSCULAR; INTRAVENOUS EVERY 2 HOUR PRN
Status: DISCONTINUED | OUTPATIENT
Start: 2019-07-02 | End: 2019-07-02

## 2019-07-02 RX ORDER — SUCRALFATE ORAL 1 G/10ML
1000 SUSPENSION ORAL
Status: DISCONTINUED | OUTPATIENT
Start: 2019-07-02 | End: 2019-07-03

## 2019-07-02 RX ORDER — PROMETHAZINE HYDROCHLORIDE 25 MG/ML
25 INJECTION, SOLUTION INTRAMUSCULAR; INTRAVENOUS ONCE
Status: COMPLETED | OUTPATIENT
Start: 2019-07-02 | End: 2019-07-02

## 2019-07-02 RX ORDER — SODIUM CHLORIDE, SODIUM GLUCONATE, SODIUM ACETATE, POTASSIUM CHLORIDE, MAGNESIUM CHLORIDE, SODIUM PHOSPHATE, DIBASIC, AND POTASSIUM PHOSPHATE .53; .5; .37; .037; .03; .012; .00082 G/100ML; G/100ML; G/100ML; G/100ML; G/100ML; G/100ML; G/100ML
100 INJECTION, SOLUTION INTRAVENOUS CONTINUOUS
Status: DISCONTINUED | OUTPATIENT
Start: 2019-07-02 | End: 2019-07-04

## 2019-07-02 RX ORDER — FENTANYL CITRATE 50 UG/ML
100 INJECTION, SOLUTION INTRAMUSCULAR; INTRAVENOUS EVERY 2 HOUR PRN
Status: DISCONTINUED | OUTPATIENT
Start: 2019-07-02 | End: 2019-07-02

## 2019-07-02 RX ORDER — OXYCODONE HCL 5 MG/5 ML
5 SOLUTION, ORAL ORAL EVERY 4 HOURS PRN
Status: DISCONTINUED | OUTPATIENT
Start: 2019-07-02 | End: 2019-07-02

## 2019-07-02 RX ORDER — OXYCODONE HCL 5 MG/5 ML
5 SOLUTION, ORAL ORAL EVERY 4 HOURS PRN
Status: DISCONTINUED | OUTPATIENT
Start: 2019-07-02 | End: 2019-07-06

## 2019-07-02 RX ORDER — ONDANSETRON 2 MG/ML
4 INJECTION INTRAMUSCULAR; INTRAVENOUS EVERY 4 HOURS PRN
Status: DISCONTINUED | OUTPATIENT
Start: 2019-07-02 | End: 2019-07-02

## 2019-07-02 RX ORDER — CHLORHEXIDINE GLUCONATE 0.12 MG/ML
30 RINSE ORAL
Qty: 473 ML | Refills: 0 | Status: SHIPPED | OUTPATIENT
Start: 2019-07-02 | End: 2019-07-08 | Stop reason: SDUPTHER

## 2019-07-02 RX ORDER — OXYCODONE HCL 5 MG/5 ML
2.5 SOLUTION, ORAL ORAL EVERY 4 HOURS PRN
Status: DISCONTINUED | OUTPATIENT
Start: 2019-07-02 | End: 2019-07-03

## 2019-07-02 RX ORDER — LORAZEPAM 2 MG/ML
0.5 INJECTION INTRAMUSCULAR 2 TIMES DAILY PRN
Status: DISCONTINUED | OUTPATIENT
Start: 2019-07-02 | End: 2019-07-06

## 2019-07-02 RX ADMIN — OXYCODONE HYDROCHLORIDE 10 MG: 5 SOLUTION ORAL at 13:12

## 2019-07-02 RX ADMIN — CHLORHEXIDINE GLUCONATE 0.12% ORAL RINSE 30 ML: 1.2 LIQUID ORAL at 14:40

## 2019-07-02 RX ADMIN — ACETAMINOPHEN 650 MG: 160 SUSPENSION ORAL at 14:39

## 2019-07-02 RX ADMIN — DEXAMETHASONE SODIUM PHOSPHATE 10 MG: 4 INJECTION, SOLUTION INTRAMUSCULAR; INTRAVENOUS at 05:48

## 2019-07-02 RX ADMIN — SODIUM CHLORIDE, SODIUM GLUCONATE, SODIUM ACETATE, POTASSIUM CHLORIDE, MAGNESIUM CHLORIDE, SODIUM PHOSPHATE, DIBASIC, AND POTASSIUM PHOSPHATE 500 ML: .53; .5; .37; .037; .03; .012; .00082 INJECTION, SOLUTION INTRAVENOUS at 17:34

## 2019-07-02 RX ADMIN — PROMETHAZINE HYDROCHLORIDE 25 MG: 25 INJECTION INTRAMUSCULAR; INTRAVENOUS at 20:26

## 2019-07-02 RX ADMIN — ONDANSETRON 4 MG: 2 INJECTION INTRAMUSCULAR; INTRAVENOUS at 17:10

## 2019-07-02 RX ADMIN — SODIUM CHLORIDE, SODIUM GLUCONATE, SODIUM ACETATE, POTASSIUM CHLORIDE, MAGNESIUM CHLORIDE, SODIUM PHOSPHATE, DIBASIC, AND POTASSIUM PHOSPHATE 100 ML/HR: .53; .5; .37; .037; .03; .012; .00082 INJECTION, SOLUTION INTRAVENOUS at 19:29

## 2019-07-02 RX ADMIN — Medication 1 SPRAY: at 17:11

## 2019-07-02 RX ADMIN — HEPARIN SODIUM 5000 UNITS: 5000 INJECTION INTRAVENOUS; SUBCUTANEOUS at 05:48

## 2019-07-02 RX ADMIN — CHLORHEXIDINE GLUCONATE 0.12% ORAL RINSE 30 ML: 1.2 LIQUID ORAL at 21:48

## 2019-07-02 RX ADMIN — LORAZEPAM 0.5 MG: 2 INJECTION INTRAMUSCULAR; INTRAVENOUS at 20:26

## 2019-07-02 RX ADMIN — CHLORHEXIDINE GLUCONATE 0.12% ORAL RINSE 30 ML: 1.2 LIQUID ORAL at 17:11

## 2019-07-02 RX ADMIN — HEPARIN SODIUM 5000 UNITS: 5000 INJECTION INTRAVENOUS; SUBCUTANEOUS at 14:39

## 2019-07-02 RX ADMIN — LORAZEPAM 0.5 MG: 2 INJECTION INTRAMUSCULAR; INTRAVENOUS at 22:37

## 2019-07-02 RX ADMIN — ONDANSETRON 4 MG: 2 INJECTION INTRAMUSCULAR; INTRAVENOUS at 11:55

## 2019-07-02 RX ADMIN — SODIUM CHLORIDE 1.4 MCG/KG/HR: 9 INJECTION, SOLUTION INTRAVENOUS at 05:45

## 2019-07-02 RX ADMIN — METRONIDAZOLE 500 MG: 500 INJECTION, SOLUTION INTRAVENOUS at 08:30

## 2019-07-02 RX ADMIN — Medication 1 SPRAY: at 21:48

## 2019-07-02 RX ADMIN — METOCLOPRAMIDE 10 MG: 5 INJECTION, SOLUTION INTRAMUSCULAR; INTRAVENOUS at 12:52

## 2019-07-02 RX ADMIN — METOCLOPRAMIDE 10 MG: 5 INJECTION, SOLUTION INTRAMUSCULAR; INTRAVENOUS at 19:25

## 2019-07-02 RX ADMIN — CHLORHEXIDINE GLUCONATE 0.12% ORAL RINSE 30 ML: 1.2 LIQUID ORAL at 08:40

## 2019-07-02 RX ADMIN — HEPARIN SODIUM 5000 UNITS: 5000 INJECTION INTRAVENOUS; SUBCUTANEOUS at 21:48

## 2019-07-02 RX ADMIN — CEFEPIME HYDROCHLORIDE 2000 MG: 2 INJECTION, POWDER, FOR SOLUTION INTRAVENOUS at 10:47

## 2019-07-02 NOTE — PROGRESS NOTES
Pt continues to C/O nausea, vomiting small amount tan emesis  Has refused IV Reglan and oral Carafate  Pt is unable to keep liquids down  Surgical critical care team notified  Receiving 500mL Isolyte IV bolus x1  Pt refused prn dose of IV Ativan

## 2019-07-02 NOTE — PROGRESS NOTES
Pt is feeling better  Was given dose of 10mg Oxycodone at 1320  Assisted OOB to chair and commode  Comfortable at present

## 2019-07-02 NOTE — PROGRESS NOTES
[facility] Progress Note  POD#4     21 y/o F s/p elective bimaxillary surgery, complicated by ARDS  She was extubated uneventfully this am   I have replaced the interdental rubber bands  She is breathing comfortably and she tolerated sips of water with Marcel syringe        P/E  GUS: moderate swelling to midface, as expected at this stage after surgery  Sutures clean and intact to B/L angle of mandible  Nose: dried blood to nostrils, no active bleeding  Mouth: good intercuspidation in the occlusal splint  Sutures to upper/lower vestibule intact and hemostatic  No wound dehiscence  Lips competent    Neck: moderate swelling to lower face, as expected      CVS: RRR, S1/S2  CHEST: CTA B/L  ABD: +BS   A-line in place  Gomez with clear urine production        A: s/p bimaxillary jaw surgery, extubated uneventfully, stable      Recommendations:     Continue antibiotics (Ancef)  Tylenol PO q4h  Encourage PO intake: liquid and full liquid with Marcel syringe   Vaseline to lips  HOB 30  DVT prophylaxis  OOB to chair this am  Oral Care by OMS- will see patient in pm  Contact Dr Bart Peres

## 2019-07-02 NOTE — RESPIRATORY THERAPY NOTE
RT Ventilator Management Note  Nahomi Fischer 20 y o  female MRN: [id number]  Unit/Bed#: ICU 13 Encounter: [id number]      Daily Screen       7/2/2019  0426 7/2/2019  0649          Patient safety screen outcome[de-identified]  Passed        Spont breathing trial % for 30 min:          Spont breathing trial outcome[de-identified]    Passed      Name of Medical Team Notified[de-identified]    PA-C      Preparing to extubate/ Notify Nurse:  Joann Wellman  Yes      Extubation order obtained:    Yes      Consider Cuff Test:    Yes      Patient extubated:    Yes      RSBI:    56              Physical Exam:   Assessment Type: Assess only  General Appearance: Alert, Awake  Respiratory Pattern: Normal  Chest Assessment: Chest expansion symmetrical  Bilateral Breath Sounds: Clear  R Breath Sounds: Clear  L Breath Sounds: Clear  Cough: None  Suction: ET Tube  O2 Device: ventilator      Resp Comments: Pt extubated at this time and placed on a 4L NC with no complications  Prior to extubation pt RSBI was 56  A cuff leak test was completed and pt had no cuff leak  MD aware  Once extubated pt had no stridor  She is in no apparent resp distress, SpO2 WNL  Will continue to monitor per resp protocol

## 2019-07-02 NOTE — PROGRESS NOTES
Progress Note - Critical Care   Cristopher Roberts 20 y o  female MRN: [id number]  Unit/Bed#: ICU 13 Encounter: [id number]    Assessment: 20year old F with mandibular hyperplasia, maxillary hypoplasia, and malocclusion s/p bimaxillary surgery    Plan:      Neuro: extubated   - hold sedation   - pain control prn                 CV:    - reduced EF on echo   - recheck possibly as outpatient                 Lung:    Acute hypoxic respiratory failure, aspiration versus NPPE   - extubated this morning with OMFS at bedside   - monitor airway                 GI:    - bedside speech eval   - may resume diet                 FEN:    - phos within normal limits                 :    - discontinue calderon                 ID: Cefepime/Flagyl   - finish today   - ancef while inpatient per OMS                 Heme:    - Heparin ppx                 Endo:    - no issues                            Msk/Skin:    - oral care per OMS   - out of bed                 Disposition:    - possible discharge    Chief Complaint: None    HPI/24hr events: No acute events  Physical Exam:   General: NAD, AAOx3  Eyes: PERRL  ENT: moist mucous membranes  Neck: supple  CV: RRR +S1/S2  Chest: breath sounds bilaterally  Abdomen: soft, NT ND  Extremities: atraumatic, no edema      Vitals:    19 0400 19 0426 19 0500 19 0600   BP:       BP Location:       Pulse: (!) 48 (!) 51 (!) 48 (!) 48   Resp: 14  12 12   Temp: 98 6 °F (37 °C)  99 °F (37 2 °C) 99 °F (37 2 °C)   TempSrc:       SpO2: 100% 100% 100% 100%   Weight:    61 8 kg (136 lb 3 9 oz)   Height:         Arterial Line BP: 132/64  Arterial Line MAP (mmHg): 86 mmHg    Temperature:   Temp (24hrs), Av 8 °F (37 1 °C), Min:97 5 °F (36 4 °C), Max:99 7 °F (37 6 °C)    Current: Temperature: 99 °F (37 2 °C)    Weights:   IBW: 54 7 kg    Body mass index is 23 39 kg/m²    Weight (last 2 days)     Date/Time   Weight    19 0600   61 8 (136 24)    19 0552   65 4 (144 18) 06/30/19 1327   54 (119 05)              Hemodynamic Monitoring:  N/A     Non-Invasive/Invasive Ventilation Settings:  Respiratory    Lab Data (Last 4 hours)    None         O2/Vent Data (Last 4 hours)      07/02 0426           Vent Mode CPAP/PS Spont       Patient safety screen outcome: Passed       FIO2 (%) (%) 40       PEEP (cmH2O) (cmH2O) 5       Pressure Support (cmH2O) (cmH20) 8       MV (Obs) 3 91       RSBI 14                 No results found for: PHART, NJR6EYW, PO2ART, RWE4ZQS, E0UKUGPW, BEART, SOURCE  SpO2 Activity: SpO2 Activity: At Rest    Intake and Outputs:  I/O       06/30 0701 - 07/01 0700 07/01 0701 - 07/02 0700    P  O   0    I V  (mL/kg) 934 4 (14 3) 634 (10 3)    NG/ 90    IV Piggyback 400 250    Feedings 1104 828    Total Intake(mL/kg) 2558 4 (39 1) 1802 (29 2)    Urine (mL/kg/hr) 2415 (1 5) 4120 (2 8)    Emesis/NG output  950    Total Output 2415 5070    Net +143 4 -3268              UOP: 4 1 L /24hour   Nutrition:        Diet Orders   (From admission, onward)            Start     Ordered    06/29/19 0839  Diet Enteral/Parenteral; Tube Feeding No Oral Diet; Jevity 1 2 Yamil; Continuous; 46; Prosource Protein Liquid - One Packet  Diet effective now     Question Answer Comment   Diet Type Enteral/Parenteral    Enteral/Parenteral Tube Feeding No Oral Diet    Tube Feeding Formula: Jevity 1 2 Yamil    Bolus/Cyclic/Continuous Continuous    Tube Feeding Goal Rate (mL/hr): 46    Prosource Protein Liquid - No Carb Prosource Protein Liquid - One Packet    RD to adjust diet per protocol?  No        06/29/19 0838        Labs:   Results from last 7 days   Lab Units 06/30/19  0501 06/29/19  0439 06/29/19  0200   WBC Thousand/uL 15 57* 14 63* 14 65*   HEMOGLOBIN g/dL 9 9* 10 5* 10 5*   HEMATOCRIT % 30 0* 32 3* 31 9*   PLATELETS Thousands/uL 132* 140* 142*   NEUTROS PCT % 87* 82* 89*   MONOS PCT % 8 11 6    Results from last 7 days   Lab Units 07/01/19  0433 06/30/19  0501 06/29/19  0439 06/28/19  3673 SODIUM mmol/L 142 142 140  --    POTASSIUM mmol/L 3 8 4 0 4 6  --    CHLORIDE mmol/L 111* 112* 112*  --    CO2 mmol/L 29 26 23  --    CO2, I-STAT mmol/L  --   --   --  22   BUN mg/dL 19 18 12  --    CREATININE mg/dL 0 57* 0 67 0 69  --    CALCIUM mg/dL 7 8* 8 0* 7 3*  --    GLUCOSE, ISTAT mg/dl  --   --   --  157*     Results from last 7 days   Lab Units 07/01/19  0433 06/30/19  0501   MAGNESIUM mg/dL 2 2 2 3     Results from last 7 days   Lab Units 07/01/19  1828 07/01/19  0433 06/30/19  0501   PHOSPHORUS mg/dL 3 2 1 6* 1 1*          Results from last 7 days   Lab Units 06/29/19  0439   LACTIC ACID mmol/L 1 5     No results found for: TROPONINI    Imaging: n/a    EKG: sinus makenna    Micro:  Lab Results   Component Value Date    BLOODCX No Growth at 48 hrs  06/29/2019    BLOODCX No Growth at 48 hrs  06/29/2019    SPUTUMCULTUR No growth 07/01/2019       Allergies: No Known Allergies    Medications:   Scheduled Meds:  Current Facility-Administered Medications:  acetaminophen 650 mg Oral Q8H Sadi Ramos MD    albuterol 2 5 mg Nebulization Q6H PRN Sadi Ramos MD    cefepime 2,000 mg Intravenous Q12H Sadi Ramos MD Last Rate: Stopped (07/01/19 2205)   chlorhexidine 15 mL Mouth/Throat Q12H [address] Jay Chino PA-C    dexmedetomidine 1 4 mcg/kg/hr Intravenous Titrated Deon Perez PA-C Last Rate: 1 4 mcg/kg/hr (07/02/19 0545)   docusate sodium 100 mg Oral BID Sdai Ramos MD    fentaNYL 50 mcg/hr Intravenous Continuous Lucina Henriquez PA-C Last Rate: 50 mcg/hr (07/01/19 2225)   heparin (porcine) 5,000 Units Subcutaneous Q8H [address] Sadi Ramos MD    HYDROmorphone 0 5 mg Intravenous Q4H PRN Leobardo Rogelio, PA-C    HYDROmorphone 1 mg Intravenous Q4H PRN Leobardo Mercado PA-C    metoclopramide 10 mg Intravenous Q6H PRN Sadi Ramos MD    metroNIDAZOLE 500 mg Intravenous Q8H Arangela Ramos MD Last Rate: 500 mg (07/01/19 8474)   midazolam 2 mg Intravenous Q2H PRN Adolfo Patel MD Gabriella    ondansetron 4 mg Intravenous Q8H PRN Janey Henriquez MD    senna 1 tablet Oral HS Janey Henriquez MD    sodium chloride 1 spray Each Nare 4x Daily Janey Henriquez MD      Continuous Infusions:  dexmedetomidine 1 4 mcg/kg/hr Last Rate: 1 4 mcg/kg/hr (07/02/19 0208)   fentaNYL 50 mcg/hr Last Rate: 50 mcg/hr (07/01/19 9823)     PRN Meds:    albuterol 2 5 mg Q6H PRN   HYDROmorphone 0 5 mg Q4H PRN   HYDROmorphone 1 mg Q4H PRN   metoclopramide 10 mg Q6H PRN   midazolam 2 mg Q2H PRN   ondansetron 4 mg Q8H PRN       VTE Pharmacologic Prophylaxis: Heparin  VTE Mechanical Prophylaxis: sequential compression device    Invasive lines and devices: Invasive Devices     Central Venous Catheter Line            CVC Central Lines 06/30/19 Triple 20cm 1 day          Arterial Line            Arterial Line 06/29/19 Radial 3 days          Drain            NG/OG/Enteral Tube Orogastric Center mouth 3 days    Urethral Catheter Temperature probe 16 Fr  3 days          Airway            ETT  Hi-Lo; Cuffed;Oral 7 mm 3 days                  Code Status: Level 1 - Full Code     Portions of the record may have been created with voice recognition software  Occasional wrong word or "sound a like" substitutions may have occurred due to the inherent limitations of voice recognition software  Read the chart carefully and recognize, using context, where substitutions have occurred       Enrique Gillette MD

## 2019-07-02 NOTE — PROGRESS NOTES
[facility] Progress Note  POD#4       19 y/o F s/p elective bimaxillary surgery  Doing well, moved to chair, tolerated small amounts of liquid PO  Oral care performed  Mouth closed with rubber bands  Pain well controlled with tylenol        A: s/p bimaxillary jaw surgery, recovering well s/p extubation     Recommendations:     Dx all antibiotics tomorrow  Can d/c A-line  Tylenol PO q4h -oxycodone for break through  Avoid IV narcotics please  Ativan 0 5mg prn anxiety  Encourage PO intake: liquid and full liquid with Marcel syringe   Vaseline to lips  HOB 30  DVT prophylaxis  OOB to chair   Encourage ambulation  Nasal spray 4x/day  Can be stepped down from ICU level tomorrow am  Oral Care by OMS- will see patient in pm  Contact Dr Navdeep Yi

## 2019-07-02 NOTE — PROGRESS NOTES
Pt sitting up in chair  Seen by Dr Ayana Guardado, who reviewed oral care and diet  Vomited again, given IV Zofran

## 2019-07-03 ENCOUNTER — APPOINTMENT (INPATIENT)
Dept: RADIOLOGY | Facility: HOSPITAL | Age: 19
DRG: 089 | End: 2019-07-03
Payer: COMMERCIAL

## 2019-07-03 PROCEDURE — 99232 SBSQ HOSP IP/OBS MODERATE 35: CPT | Performed by: STUDENT IN AN ORGANIZED HEALTH CARE EDUCATION/TRAINING PROGRAM

## 2019-07-03 PROCEDURE — 94760 N-INVAS EAR/PLS OXIMETRY 1: CPT

## 2019-07-03 PROCEDURE — 74018 RADEX ABDOMEN 1 VIEW: CPT

## 2019-07-03 RX ORDER — SCOLOPAMINE TRANSDERMAL SYSTEM 1 MG/1
1 PATCH, EXTENDED RELEASE TRANSDERMAL
Status: DISCONTINUED | OUTPATIENT
Start: 2019-07-03 | End: 2019-07-05

## 2019-07-03 RX ORDER — PROMETHAZINE HYDROCHLORIDE 25 MG/ML
12.5 INJECTION, SOLUTION INTRAMUSCULAR; INTRAVENOUS EVERY 12 HOURS PRN
Status: DISCONTINUED | OUTPATIENT
Start: 2019-07-03 | End: 2019-07-06

## 2019-07-03 RX ORDER — ONDANSETRON 2 MG/ML
4 INJECTION INTRAMUSCULAR; INTRAVENOUS EVERY 8 HOURS PRN
Status: DISCONTINUED | OUTPATIENT
Start: 2019-07-03 | End: 2019-07-03

## 2019-07-03 RX ADMIN — HEPARIN SODIUM 5000 UNITS: 5000 INJECTION INTRAVENOUS; SUBCUTANEOUS at 08:00

## 2019-07-03 RX ADMIN — Medication 1 SPRAY: at 12:06

## 2019-07-03 RX ADMIN — Medication 1 SPRAY: at 22:35

## 2019-07-03 RX ADMIN — CHLORHEXIDINE GLUCONATE 0.12% ORAL RINSE 30 ML: 1.2 LIQUID ORAL at 22:34

## 2019-07-03 RX ADMIN — CHLORHEXIDINE GLUCONATE 0.12% ORAL RINSE 30 ML: 1.2 LIQUID ORAL at 13:10

## 2019-07-03 RX ADMIN — SCOPALAMINE 1 PATCH: 1 PATCH, EXTENDED RELEASE TRANSDERMAL at 01:57

## 2019-07-03 RX ADMIN — METOCLOPRAMIDE 10 MG: 5 INJECTION, SOLUTION INTRAMUSCULAR; INTRAVENOUS at 13:07

## 2019-07-03 RX ADMIN — OXYCODONE HYDROCHLORIDE 5 MG: 5 SOLUTION ORAL at 18:49

## 2019-07-03 RX ADMIN — CHLORHEXIDINE GLUCONATE 0.12% ORAL RINSE 30 ML: 1.2 LIQUID ORAL at 17:34

## 2019-07-03 RX ADMIN — PROMETHAZINE HYDROCHLORIDE 12.5 MG: 25 INJECTION INTRAMUSCULAR; INTRAVENOUS at 08:40

## 2019-07-03 RX ADMIN — PROMETHAZINE HYDROCHLORIDE 12.5 MG: 25 INJECTION INTRAMUSCULAR; INTRAVENOUS at 23:42

## 2019-07-03 RX ADMIN — ACETAMINOPHEN 650 MG: 160 SUSPENSION ORAL at 22:34

## 2019-07-03 RX ADMIN — HEPARIN SODIUM 5000 UNITS: 5000 INJECTION INTRAVENOUS; SUBCUTANEOUS at 13:11

## 2019-07-03 RX ADMIN — Medication 1 SPRAY: at 17:34

## 2019-07-03 RX ADMIN — CHLORHEXIDINE GLUCONATE 0.12% ORAL RINSE 30 ML: 1.2 LIQUID ORAL at 08:00

## 2019-07-03 RX ADMIN — SODIUM CHLORIDE, SODIUM GLUCONATE, SODIUM ACETATE, POTASSIUM CHLORIDE, MAGNESIUM CHLORIDE, SODIUM PHOSPHATE, DIBASIC, AND POTASSIUM PHOSPHATE 100 ML/HR: .53; .5; .37; .037; .03; .012; .00082 INJECTION, SOLUTION INTRAVENOUS at 18:00

## 2019-07-03 RX ADMIN — DEXMEDETOMIDINE 0.5 MCG/KG/HR: 100 INJECTION, SOLUTION, CONCENTRATE INTRAVENOUS at 00:29

## 2019-07-03 RX ADMIN — HEPARIN SODIUM 5000 UNITS: 5000 INJECTION INTRAVENOUS; SUBCUTANEOUS at 22:34

## 2019-07-03 RX ADMIN — CHLORHEXIDINE GLUCONATE 0.12% ORAL RINSE 30 ML: 1.2 LIQUID ORAL at 12:00

## 2019-07-03 RX ADMIN — SODIUM CHLORIDE, SODIUM GLUCONATE, SODIUM ACETATE, POTASSIUM CHLORIDE, MAGNESIUM CHLORIDE, SODIUM PHOSPHATE, DIBASIC, AND POTASSIUM PHOSPHATE 500 ML: .53; .5; .37; .037; .03; .012; .00082 INJECTION, SOLUTION INTRAVENOUS at 00:00

## 2019-07-03 RX ADMIN — Medication 1 SPRAY: at 08:20

## 2019-07-03 RX ADMIN — METOCLOPRAMIDE 10 MG: 5 INJECTION, SOLUTION INTRAMUSCULAR; INTRAVENOUS at 20:42

## 2019-07-03 RX ADMIN — METOCLOPRAMIDE 10 MG: 5 INJECTION, SOLUTION INTRAMUSCULAR; INTRAVENOUS at 02:21

## 2019-07-03 NOTE — PROGRESS NOTES
[facility] Progress Note  POD#5    21 y/o F s/p elective bimaxillary surgery  She was tachycardic overnight, likely 2/2 precidex withdrawal  Low dose precidex was restarted, tachycardia resolved  She had episodes of nausea, it seems that zofran increases the nausea- phrenegran worked well  Pain remain well controlled with PO tylenol  I performed oral and nasal care and reviewed drinking technique         P/E  GUS: improving swelling to midface Sutures clean and intact to B/L angle of mandible  Nose: dried blood to nostrils, no active bleeding  Mouth: good intercuspidation in the occlusal splint  Sutures to upper/lower vestibule intact and hemostatic  No wound dehiscence    Neck: moderate swelling to lower face, as expected      CVS: RRR, S1/S2  CHEST: CTA B/L  ABD: +BS   A-line in place        A: s/p bimaxillary jaw surgery, improving     Recommendations:    Please encourage pt OOB to chair and to make small steps between bed and chair  PO feeding goal: 60cc/ hour with Marcel syringe  Nasal saline spray 4x/day  Tooth brushing and chlorhexidine rinse 5x/day  Vaseline to lips    Pain management:  Tylenol PO q4h    Nausea/Vomitig  Reglan  Precedex    D/C A-line if pt stable once weaned off precedex  Overall management as per ICU    Contact Dr Bart Peres if any issues

## 2019-07-03 NOTE — PROGRESS NOTES
Progress Note - Critical Care   Rik Suggs 20 y o  female MRN: [id number]  Unit/Bed#: ICU 13 Encounter: [id number]    Assessment: 20 F status post bimaxillary jaw surgery, complicated by acute respiratory failure secondary to likely NPPE vs aspiration pneumonitis    Plan:        Neuro: GCS 15   - pain control prn   - precedex wean                 CV:    - recheck echo as outpatient                 Lung: Acute hypoxic respiratory failure   - resolved                 GI: nausea and vomiting, likely medication-induced   - precedex wean   - continue zofran, reglan                 FEN: Adequate urine output                 : No acute issues                 ID: Finished 5 day course of antibiotics                 Heme: Heparin ppx                 Endo: No issues                            Msk/Skin:    - Oral care per OMS   - out of bed and ambulate                 Disposition:    - med surg after precedex wean    Chief Complaint: None    HPI/24hr events: extubated yesterday, nausea yesterday evening with multiple episodes of vomiting    Physical Exam:   General: NAD, AAOx3  Eyes: PERRL  ENT: moist mucous membranes  Neck: supple  CV: RRR +S1/S2  Chest: breath sounds bilaterally  Abdomen: soft, NT ND  Extremities: atraumatic, no edema      Vitals:    19 0200 19 0300 19 0400 19 0500   BP:       BP Location:       Pulse: (!) 110 (!) 114 104 104   Resp: (!) 26 (!) 30 (!) 27 (!) 27   Temp:   99 4 °F (37 4 °C)    TempSrc:   Tympanic    SpO2: 98% 98% 98% 99%   Weight:    58 1 kg (128 lb 1 4 oz)   Height:         Arterial Line BP: 146/86  Arterial Line MAP (mmHg): 108 mmHg    Temperature:   Temp (24hrs), Av 9 °F (37 2 °C), Min:98 1 °F (36 7 °C), Max:100 1 °F (37 8 °C)    Current: Temperature: 99 4 °F (37 4 °C)    Weights:   IBW: 54 7 kg    Body mass index is 21 99 kg/m²    Weight (last 2 days)     Date/Time   Weight    19 0500   58 1 (128 09)    19 0600   61 8 (136 24)    19 0552   65 4 (144 18)              Hemodynamic Monitoring:  N/A     Non-Invasive/Invasive Ventilation Settings:  Respiratory    Lab Data (Last 4 hours)    None         O2/Vent Data (Last 4 hours)    None              No results found for: PHART, ZMU3BYB, PO2ART, RKN4GOR, C2LHHTGW, BEART, SOURCE  SpO2: SpO2: 97 %, SpO2 Device: O2 Device: Nasal cannula    Intake and Outputs:  I/O       07/01 0701 - 07/02 0700 07/02 0701 - 07/03 0700    P  O  0 60    I V  (mL/kg) 634 (10 3) 1920 8 (33 1)    NG/GT 90     IV Piggyback 250 0    Feedings 828     Total Intake(mL/kg) 1802 (29 2) 1980 8 (34 1)    Urine (mL/kg/hr) 4120 (2 8) 1884 (1 4)    Emesis/NG output 950 100    Total Output 5070 1984    Net -3268 -3 2          Unmeasured Emesis Occurrence  7 x        UOP: 1 9 L/24 hour   Nutrition:        Diet Orders   (From admission, onward)            Start     Ordered    07/02/19 1647  Dietary nutrition supplements  Once     Question Answer Comment   Select Supplement: Ensure Enlive-Strawberry    Select Supplement: Ensure Enlive-Vanilla    Frequency Breakfast, Lunch, Dinner, HS        07/02/19 1648    07/02/19 1118  Diet Surgical; Full Liquid  Diet effective now     Question Answer Comment   Diet Type Surgical    Surgical Full Liquid    RD to adjust diet per protocol?  No        07/02/19 1117          Labs:   Results from last 7 days   Lab Units 06/30/19  0501 06/29/19  0439 06/29/19  0200   WBC Thousand/uL 15 57* 14 63* 14 65*   HEMOGLOBIN g/dL 9 9* 10 5* 10 5*   HEMATOCRIT % 30 0* 32 3* 31 9*   PLATELETS Thousands/uL 132* 140* 142*   NEUTROS PCT % 87* 82* 89*   MONOS PCT % 8 11 6    Results from last 7 days   Lab Units 07/01/19  0433 06/30/19  0501 06/29/19  0439 06/28/19  2309   SODIUM mmol/L 142 142 140  --    POTASSIUM mmol/L 3 8 4 0 4 6  --    CHLORIDE mmol/L 111* 112* 112*  --    CO2 mmol/L 29 26 23  --    CO2, I-STAT mmol/L  --   --   --  22   BUN mg/dL 19 18 12  --    CREATININE mg/dL 0 57* 0 67 0 69  --    CALCIUM mg/dL 7 8* 8  0* 7 3*  --    GLUCOSE, ISTAT mg/dl  --   --   --  157*     Results from last 7 days   Lab Units 07/01/19  0433 06/30/19  0501   MAGNESIUM mg/dL 2 2 2 3     Results from last 7 days   Lab Units 07/01/19  1828 07/01/19  0433 06/30/19  0501   PHOSPHORUS mg/dL 3 2 1 6* 1 1*          Results from last 7 days   Lab Units 06/29/19  0439   LACTIC ACID mmol/L 1 5     No results found for: TROPONINI    Imaging: I have personally reviewed pertinent reports        EKG: sinus tachycardia    Micro:  Lab Results   Component Value Date    BLOODCX No Growth at 72 hrs  06/29/2019    BLOODCX No Growth at 72 hrs  06/29/2019    SPUTUMCULTUR No growth 07/01/2019       Allergies: No Known Allergies    Medications:   Scheduled Meds:  Current Facility-Administered Medications:  acetaminophen 650 mg Oral Q8H Suni Alvarado MD    albuterol 2 5 mg Nebulization Q6H PRN Suni Alvarado MD    chlorhexidine 30 mL Swish & Spit 5x Daily Yovani Robb DMD    dexmedetomidine 0 1-0 7 mcg/kg/hr Intravenous Titrated Krunal Bolesa, CRNP Last Rate: 0 4 mcg/kg/hr (07/03/19 0627)   heparin (porcine) 5,000 Units Subcutaneous Q8H [address] Suni Alvarado MD    LORazepam 0 5 mg Intravenous BID PRN Yovani Robb, NABOR    metoclopramide 10 mg Intravenous Q6H PRN Suni Alvarado MD    multi-electrolyte 100 mL/hr Intravenous Continuous Krunal Bolesa, CRNP Last Rate: 100 mL/hr (07/02/19 1929)   ondansetron 4 mg Intravenous Q8H PRN Yovani Robb, DMD    oxyCODONE 5 mg Oral Q4H PRN Florinda Moreland MD    scopolamine 1 patch Transdermal Q72H Krunal Hyde, TAJNP    senna 1 tablet Oral HS Suni Alvarado MD    sodium chloride 1 spray Each Nare 4x Daily Suni Alvarado MD      Continuous Infusions:  dexmedetomidine 0 1-0 7 mcg/kg/hr Last Rate: 0 4 mcg/kg/hr (07/03/19 0627)   multi-electrolyte 100 mL/hr Last Rate: 100 mL/hr (07/02/19 1929)     PRN Meds:    albuterol 2 5 mg Q6H PRN   LORazepam 0 5 mg BID PRN   metoclopramide 10 mg Q6H PRN ondansetron 4 mg Q8H PRN   oxyCODONE 5 mg Q4H PRN       VTE Pharmacologic Prophylaxis: Heparin  VTE Mechanical Prophylaxis: sequential compression device    Invasive lines and devices: Invasive Devices     Central Venous Catheter Line            CVC Central Lines 06/30/19 Triple 20cm 2 days          Arterial Line            Arterial Line 06/29/19 Radial 4 days                    Code Status: Level 1 - Full Code     Portions of the record may have been created with voice recognition software  Occasional wrong word or "sound a like" substitutions may have occurred due to the inherent limitations of voice recognition software  Read the chart carefully and recognize, using context, where substitutions have occurred       Cici Recio MD

## 2019-07-03 NOTE — PROGRESS NOTES
C/o nausea, vomited approx 150 cc green / tan, po intake poor, c/o abd pain, dr Krunal Welch made aware, placed back in bed, KUB abd xray ordered, comfort measures given

## 2019-07-04 PROBLEM — Z98.890 POST-OPERATIVE NAUSEA AND VOMITING: Status: ACTIVE | Noted: 2019-07-04

## 2019-07-04 PROBLEM — R11.2 POST-OPERATIVE NAUSEA AND VOMITING: Status: ACTIVE | Noted: 2019-07-04

## 2019-07-04 PROBLEM — E87.2 METABOLIC ACIDOSIS: Status: RESOLVED | Noted: 2019-06-29 | Resolved: 2019-07-04

## 2019-07-04 LAB
BACTERIA BLD CULT: NORMAL
BACTERIA BLD CULT: NORMAL

## 2019-07-04 PROCEDURE — 93005 ELECTROCARDIOGRAM TRACING: CPT

## 2019-07-04 PROCEDURE — 99232 SBSQ HOSP IP/OBS MODERATE 35: CPT | Performed by: STUDENT IN AN ORGANIZED HEALTH CARE EDUCATION/TRAINING PROGRAM

## 2019-07-04 RX ORDER — DEXTROSE, SODIUM CHLORIDE, AND POTASSIUM CHLORIDE 5; .45; .15 G/100ML; G/100ML; G/100ML
84 INJECTION INTRAVENOUS CONTINUOUS
Status: DISCONTINUED | OUTPATIENT
Start: 2019-07-04 | End: 2019-07-05

## 2019-07-04 RX ORDER — KETOROLAC TROMETHAMINE 30 MG/ML
15 INJECTION, SOLUTION INTRAMUSCULAR; INTRAVENOUS ONCE
Status: COMPLETED | OUTPATIENT
Start: 2019-07-04 | End: 2019-07-04

## 2019-07-04 RX ORDER — LORAZEPAM 2 MG/ML
0.5 CONCENTRATE ORAL 2 TIMES DAILY PRN
Qty: 8 ML | Refills: 0 | Status: SHIPPED | OUTPATIENT
Start: 2019-07-04 | End: 2019-07-07 | Stop reason: SDUPTHER

## 2019-07-04 RX ADMIN — Medication 1 SPRAY: at 11:11

## 2019-07-04 RX ADMIN — KETOROLAC TROMETHAMINE 15 MG: 30 INJECTION, SOLUTION INTRAMUSCULAR; INTRAVENOUS at 00:53

## 2019-07-04 RX ADMIN — HEPARIN SODIUM 5000 UNITS: 5000 INJECTION INTRAVENOUS; SUBCUTANEOUS at 06:20

## 2019-07-04 RX ADMIN — Medication 1 SPRAY: at 17:31

## 2019-07-04 RX ADMIN — Medication 1 SPRAY: at 21:48

## 2019-07-04 RX ADMIN — METOCLOPRAMIDE 10 MG: 5 INJECTION, SOLUTION INTRAMUSCULAR; INTRAVENOUS at 12:44

## 2019-07-04 RX ADMIN — METOCLOPRAMIDE 10 MG: 5 INJECTION, SOLUTION INTRAMUSCULAR; INTRAVENOUS at 20:19

## 2019-07-04 RX ADMIN — SODIUM CHLORIDE, SODIUM GLUCONATE, SODIUM ACETATE, POTASSIUM CHLORIDE, MAGNESIUM CHLORIDE, SODIUM PHOSPHATE, DIBASIC, AND POTASSIUM PHOSPHATE 100 ML/HR: .53; .5; .37; .037; .03; .012; .00082 INJECTION, SOLUTION INTRAVENOUS at 04:11

## 2019-07-04 RX ADMIN — DEXTROSE, SODIUM CHLORIDE, AND POTASSIUM CHLORIDE 84 ML/HR: 5; .45; .15 INJECTION INTRAVENOUS at 17:53

## 2019-07-04 RX ADMIN — ACETAMINOPHEN 650 MG: 160 SUSPENSION ORAL at 21:48

## 2019-07-04 RX ADMIN — CHLORHEXIDINE GLUCONATE 0.12% ORAL RINSE 30 ML: 1.2 LIQUID ORAL at 14:09

## 2019-07-04 RX ADMIN — HEPARIN SODIUM 5000 UNITS: 5000 INJECTION INTRAVENOUS; SUBCUTANEOUS at 14:09

## 2019-07-04 RX ADMIN — LORAZEPAM 0.5 MG: 2 INJECTION INTRAMUSCULAR; INTRAVENOUS at 01:14

## 2019-07-04 RX ADMIN — METOCLOPRAMIDE 10 MG: 5 INJECTION, SOLUTION INTRAMUSCULAR; INTRAVENOUS at 04:43

## 2019-07-04 RX ADMIN — CHLORHEXIDINE GLUCONATE 0.12% ORAL RINSE 30 ML: 1.2 LIQUID ORAL at 17:31

## 2019-07-04 RX ADMIN — PROMETHAZINE HYDROCHLORIDE 12.5 MG: 25 INJECTION INTRAMUSCULAR; INTRAVENOUS at 11:06

## 2019-07-04 RX ADMIN — CHLORHEXIDINE GLUCONATE 0.12% ORAL RINSE 30 ML: 1.2 LIQUID ORAL at 06:20

## 2019-07-04 RX ADMIN — Medication 1 SPRAY: at 08:05

## 2019-07-04 RX ADMIN — CHLORHEXIDINE GLUCONATE 0.12% ORAL RINSE 30 ML: 1.2 LIQUID ORAL at 11:13

## 2019-07-04 RX ADMIN — CHLORHEXIDINE GLUCONATE 0.12% ORAL RINSE 30 ML: 1.2 LIQUID ORAL at 21:48

## 2019-07-04 RX ADMIN — PROMETHAZINE HYDROCHLORIDE 12.5 MG: 25 INJECTION INTRAMUSCULAR; INTRAVENOUS at 23:02

## 2019-07-04 RX ADMIN — HEPARIN SODIUM 5000 UNITS: 5000 INJECTION INTRAVENOUS; SUBCUTANEOUS at 21:48

## 2019-07-04 NOTE — PLAN OF CARE
Problem: Prexisting or High Potential for Compromised Skin Integrity  Goal: Skin integrity is maintained or improved  Description  INTERVENTIONS:  - Identify patients at risk for skin breakdown  - Assess and monitor skin integrity  - Assess and monitor nutrition and hydration status  - Monitor labs (i e  albumin)  - Assess for incontinence   - Turn and reposition patient  - Assist with mobility/ambulation  - Relieve pressure over bony prominences  - Avoid friction and shearing  - Provide appropriate hygiene as needed including keeping skin clean and dry  - Evaluate need for skin moisturizer/barrier cream  - Collaborate with interdisciplinary team (i e  Nutrition, Rehabilitation, etc )   - Patient/family teaching  Outcome: Progressing     Problem: Potential for Falls  Goal: Patient will remain free of falls  Description  INTERVENTIONS:  - Assess patient frequently for physical needs  -  Identify cognitive and physical deficits and behaviors that affect risk of falls    -  [place] fall precautions as indicated by assessment   - Educate patient/family on patient safety including physical limitations  - Instruct patient to call for assistance with activity based on assessment  - Modify environment to reduce risk of injury  - Consider OT/PT consult to assist with strengthening/mobility  Outcome: Progressing     Problem: PAIN - ADULT  Goal: Verbalizes/displays adequate comfort level or baseline comfort level  Description  Interventions:  - Encourage patient to monitor pain and request assistance  - Assess pain using appropriate pain scale  - Administer analgesics based on type and severity of pain and evaluate response  - Implement non-pharmacological measures as appropriate and evaluate response  - Consider cultural and social influences on pain and pain management  - Notify physician/advanced practitioner if interventions unsuccessful or patient reports new pain  Outcome: Progressing     Problem: INFECTION - ADULT  Goal: Absence or prevention of progression during hospitalization  Description  INTERVENTIONS:  - Assess and monitor for signs and symptoms of infection  - Monitor lab/diagnostic results  - Monitor all insertion sites, i e  indwelling lines, tubes, and drains  - Monitor endotracheal (as able) and nasal secretions for changes in amount and color  - [place] appropriate cooling/warming therapies per order  - Administer medications as ordered  - Instruct and encourage patient and family to use good hand hygiene technique  - Identify and instruct in appropriate isolation precautions for identified infection/condition  Outcome: Progressing  Goal: Absence of fever/infection during neutropenic period  Description  INTERVENTIONS:  - Monitor WBC  - Implement neutropenic guidelines  Outcome: Progressing     Problem: SAFETY ADULT  Goal: Patient will remain free of falls  Description  INTERVENTIONS:  - Assess patient frequently for physical needs  -  Identify cognitive and physical deficits and behaviors that affect risk of falls    -  [place] fall precautions as indicated by assessment   - Educate patient/family on patient safety including physical limitations  - Instruct patient to call for assistance with activity based on assessment  - Modify environment to reduce risk of injury  - Consider OT/PT consult to assist with strengthening/mobility  Outcome: Progressing  Goal: Maintain or return to baseline ADL function  Description  INTERVENTIONS:  -  Assess patient's ability to carry out ADLs; assess patient's baseline for ADL function and identify physical deficits which impact ability to perform ADLs (bathing, care of mouth/teeth, toileting, grooming, dressing, etc )  - Assess/evaluate cause of self-care deficits   - Assess range of motion  - Assess patient's mobility; develop plan if impaired  - Assess patient's need for assistive devices and provide as appropriate  - Encourage maximum independence but intervene and supervise when necessary  ¯ Involve family in performance of ADLs  ¯ Assess for home care needs following discharge   ¯ Request OT consult to assist with ADL evaluation and planning for discharge  ¯ Provide patient education as appropriate  Outcome: Progressing  Goal: Maintain or return mobility status to optimal level  Description  INTERVENTIONS:  - Assess patient's baseline mobility status (ambulation, transfers, stairs, etc )    - Identify cognitive and physical deficits and behaviors that affect mobility  - Identify mobility aids required to assist with transfers and/or ambulation (gait belt, sit-to-stand, lift, walker, cane, etc )  - [place] fall precautions as indicated by assessment  - Record patient progress and toleration of activity level on Mobility SBAR; progress patient to next Phase/Stage  - Instruct patient to call for assistance with activity based on assessment  - Request Rehabilitation consult to assist with strengthening/weightbearing, etc   Outcome: Progressing     Problem: DISCHARGE PLANNING  Goal: Discharge to home or other facility with appropriate resources  Description  INTERVENTIONS:  - Identify barriers to discharge w/patient and caregiver  - Arrange for needed discharge resources and transportation as appropriate  - Identify discharge learning needs (meds, wound care, etc )  - Arrange for interpretive services to assist at discharge as needed  - Refer to Case Management Department for coordinating discharge planning if the patient needs post-hospital services based on physician/advanced practitioner order or complex needs related to functional status, cognitive ability, or social support system  Outcome: Progressing     Problem: Knowledge Deficit  Goal: Patient/family/caregiver demonstrates understanding of disease process, treatment plan, medications, and discharge instructions  Description  Complete learning assessment and assess knowledge base    Interventions:  - Provide teaching at level of understanding  - Provide teaching via preferred learning methods  Outcome: Progressing     Problem: COPING  Goal: Will report anxiety at manageable levels  Description  INTERVENTIONS:  - Administer medication as ordered  - Teach and encourage coping skills  - Provide emotional support  - Assess patient/family for anxiety and ability to cope  Outcome: Progressing     Problem: Nutrition/Hydration-ADULT  Goal: Nutrient/Hydration intake appropriate for improving, restoring or maintaining nutritional needs  Description  Monitor and assess patient's nutrition/hydration status for malnutrition (ex- brittle hair, bruises, dry skin, pale skin and conjunctiva, muscle wasting, smooth red tongue, and disorientation)  Collaborate with interdisciplinary team and initiate plan and interventions as ordered  Monitor patient's weight and dietary intake as ordered or per policy  Utilize nutrition screening tool and intervene per policy  Determine patient's food preferences and provide high-protein, high-caloric foods as appropriate       INTERVENTIONS:  - Monitor oral intake, urinary output, labs, and treatment plans  - Assess nutrition and hydration status and recommend course of action  - Evaluate amount of meals eaten  - Assist patient with eating if necessary   - Allow adequate time for meals  - Recommend/ encourage appropriate diets, oral nutritional supplements, and vitamin/mineral supplements  - Order, calculate, and assess calorie counts as needed  - Recommend, monitor, and adjust tube feedings and TPN/PPN based on assessed needs  - Assess need for intravenous fluids  - Provide specific nutrition/hydration education as appropriate  - Include patient/family/caregiver in decisions related to nutrition  Outcome: Progressing

## 2019-07-04 NOTE — PLAN OF CARE
Problem: Prexisting or High Potential for Compromised Skin Integrity  Goal: Skin integrity is maintained or improved  Description  INTERVENTIONS:  - Identify patients at risk for skin breakdown  - Assess and monitor skin integrity  - Assess and monitor nutrition and hydration status  - Monitor labs (i e  albumin)  - Assess for incontinence   - Turn and reposition patient  - Assist with mobility/ambulation  - Relieve pressure over bony prominences  - Avoid friction and shearing  - Provide appropriate hygiene as needed including keeping skin clean and dry  - Evaluate need for skin moisturizer/barrier cream  - Collaborate with interdisciplinary team (i e  Nutrition, Rehabilitation, etc )   - Patient/family teaching  Outcome: Progressing     Problem: Potential for Falls  Goal: Patient will remain free of falls  Description  INTERVENTIONS:  - Assess patient frequently for physical needs  -  Identify cognitive and physical deficits and behaviors that affect risk of falls    -  [place] fall precautions as indicated by assessment   - Educate patient/family on patient safety including physical limitations  - Instruct patient to call for assistance with activity based on assessment  - Modify environment to reduce risk of injury  - Consider OT/PT consult to assist with strengthening/mobility  Outcome: Progressing     Problem: PAIN - ADULT  Goal: Verbalizes/displays adequate comfort level or baseline comfort level  Description  Interventions:  - Encourage patient to monitor pain and request assistance  - Assess pain using appropriate pain scale  - Administer analgesics based on type and severity of pain and evaluate response  - Implement non-pharmacological measures as appropriate and evaluate response  - Consider cultural and social influences on pain and pain management  - Notify physician/advanced practitioner if interventions unsuccessful or patient reports new pain  Outcome: Progressing     Problem: INFECTION - ADULT  Goal: Absence or prevention of progression during hospitalization  Description  INTERVENTIONS:  - Assess and monitor for signs and symptoms of infection  - Monitor lab/diagnostic results  - Monitor all insertion sites, i e  indwelling lines, tubes, and drains  - Monitor endotracheal (as able) and nasal secretions for changes in amount and color  - [place] appropriate cooling/warming therapies per order  - Administer medications as ordered  - Instruct and encourage patient and family to use good hand hygiene technique  - Identify and instruct in appropriate isolation precautions for identified infection/condition  Outcome: Progressing  Goal: Absence of fever/infection during neutropenic period  Description  INTERVENTIONS:  - Monitor WBC  - Implement neutropenic guidelines  Outcome: Progressing     Problem: SAFETY ADULT  Goal: Patient will remain free of falls  Description  INTERVENTIONS:  - Assess patient frequently for physical needs  -  Identify cognitive and physical deficits and behaviors that affect risk of falls    -  [place] fall precautions as indicated by assessment   - Educate patient/family on patient safety including physical limitations  - Instruct patient to call for assistance with activity based on assessment  - Modify environment to reduce risk of injury  - Consider OT/PT consult to assist with strengthening/mobility  Outcome: Progressing  Goal: Maintain or return to baseline ADL function  Description  INTERVENTIONS:  -  Assess patient's ability to carry out ADLs; assess patient's baseline for ADL function and identify physical deficits which impact ability to perform ADLs (bathing, care of mouth/teeth, toileting, grooming, dressing, etc )  - Assess/evaluate cause of self-care deficits   - Assess range of motion  - Assess patient's mobility; develop plan if impaired  - Assess patient's need for assistive devices and provide as appropriate  - Encourage maximum independence but intervene and supervise when necessary  ¯ Involve family in performance of ADLs  ¯ Assess for home care needs following discharge   ¯ Request OT consult to assist with ADL evaluation and planning for discharge  ¯ Provide patient education as appropriate  Outcome: Progressing  Goal: Maintain or return mobility status to optimal level  Description  INTERVENTIONS:  - Assess patient's baseline mobility status (ambulation, transfers, stairs, etc )    - Identify cognitive and physical deficits and behaviors that affect mobility  - Identify mobility aids required to assist with transfers and/or ambulation (gait belt, sit-to-stand, lift, walker, cane, etc )  - [place] fall precautions as indicated by assessment  - Record patient progress and toleration of activity level on Mobility SBAR; progress patient to next Phase/Stage  - Instruct patient to call for assistance with activity based on assessment  - Request Rehabilitation consult to assist with strengthening/weightbearing, etc   Outcome: Progressing     Problem: DISCHARGE PLANNING  Goal: Discharge to home or other facility with appropriate resources  Description  INTERVENTIONS:  - Identify barriers to discharge w/patient and caregiver  - Arrange for needed discharge resources and transportation as appropriate  - Identify discharge learning needs (meds, wound care, etc )  - Arrange for interpretive services to assist at discharge as needed  - Refer to Case Management Department for coordinating discharge planning if the patient needs post-hospital services based on physician/advanced practitioner order or complex needs related to functional status, cognitive ability, or social support system  Outcome: Progressing     Problem: Knowledge Deficit  Goal: Patient/family/caregiver demonstrates understanding of disease process, treatment plan, medications, and discharge instructions  Description  Complete learning assessment and assess knowledge base    Interventions:  - Provide teaching at level of understanding  - Provide teaching via preferred learning methods  Outcome: Progressing     Problem: COPING  Goal: Will report anxiety at manageable levels  Description  INTERVENTIONS:  - Administer medication as ordered  - Teach and encourage coping skills  - Provide emotional support  - Assess patient/family for anxiety and ability to cope  Outcome: Progressing     Problem: Nutrition/Hydration-ADULT  Goal: Nutrient/Hydration intake appropriate for improving, restoring or maintaining nutritional needs  Description  Monitor and assess patient's nutrition/hydration status for malnutrition (ex- brittle hair, bruises, dry skin, pale skin and conjunctiva, muscle wasting, smooth red tongue, and disorientation)  Collaborate with interdisciplinary team and initiate plan and interventions as ordered  Monitor patient's weight and dietary intake as ordered or per policy  Utilize nutrition screening tool and intervene per policy  Determine patient's food preferences and provide high-protein, high-caloric foods as appropriate       INTERVENTIONS:  - Monitor oral intake, urinary output, labs, and treatment plans  - Assess nutrition and hydration status and recommend course of action  - Evaluate amount of meals eaten  - Assist patient with eating if necessary   - Allow adequate time for meals  - Recommend/ encourage appropriate diets, oral nutritional supplements, and vitamin/mineral supplements  - Order, calculate, and assess calorie counts as needed  - Recommend, monitor, and adjust tube feedings and TPN/PPN based on assessed needs  - Assess need for intravenous fluids  - Provide specific nutrition/hydration education as appropriate  - Include patient/family/caregiver in decisions related to nutrition  Outcome: Progressing

## 2019-07-04 NOTE — PROGRESS NOTES
[facility] Progress Note  POD#6     21 y/o F s/p elective bimaxillary surgery  He tolerates small amounts of PO but has been having repeated episodes of nausea/vomiting  She has sluggish bowel sounds, passing gas  She is OOB to chair and started ambulating with assistance, with the air of a walker  No pain issues  I performed oral care        P/E  GUS: improving swelling to midface  sutures to B/L cheeks clean and intact  Nose: dried blood to nostrils, patent airway  Mouth: good intercuspidation in the occlusal splint  Sutures to upper/lower vestibule intact and hemostatic  No wound dehiscence  Neck: improving swelling to lower face, as expected   R IJ in place      CVS: RRR, S1/S2  CHEST: CTA B/L  ABD: +BS, sluggish         A: s/p bimaxillary jaw surgery, improving     Recommendations:     OOB to chair and ambulate with assistance every hour  PO feeding goal: 60cc/ hour with Marcel syringe  Nasal saline spray 4x/day  Tooth brushing and chlorhexidine rinse 5x/day  Vaseline to lips     Pain management:  Tylenol PO q4h     Nausea/Vomitig  Reglan  Precedex     Stop IV fluids - will bolus later if if intake inadequate  Obtain Periferal IV  D/C right IJ line  Step down to level 2  Overall management as per ICU     Contact Dr Mickey Strong if any issues

## 2019-07-04 NOTE — PROGRESS NOTES
Progress Note - Critical Care   Uyen Chi 20 y o  female MRN: [id number]  Unit/Bed#: ICU 13 Encounter: [id number]    Assessment: 20 F s/p bimaxillary surgery complicated by acute respiratory failure secondary to NPPE vs aspiration    Plan:      Neuro: no acute issues   - scheduled tylenol   - pain control prn   - ativan prn                 CV: No acute issues   - outpatient echo                 Lung: acute respiratory failure   - resolved                 GI: post-operative nausea and vomiting   - improved   - continue scopolamine patch   - continue reglan, phenergan prn                 FEN: UOP adequate   - trend                 : No acute issues                 ID: No acute issues                 Heme: Heparin ppx                 Endo: No acute issues                            Msk/Skin: Out of bed and ambulate                 Disposition: MS    Chief Complaint: None    HPI/24hr events: weaned off precedex  nausea and vomiting yesterday, improved this morning  Passing flatus, no bowel movements    Physical Exam:   General: NAD, AAOx3  Eyes: PERRL  ENT: moist mucous membranes  Neck: supple  CV: RRR +S1/S2  Chest: breath sounds bilaterally  Abdomen: soft, NT ND  Extremities: atraumatic, no edema      Vitals:    19 1800 19 2045 19 0000 19 0400   BP: 127/66 133/71 133/68 138/67   BP Location:  Right arm Right arm Right arm   Pulse: 74 94 72 86   Resp: 16 21 (!) 23 (!) 23   Temp: 100 2 °F (37 9 °C) 99 7 °F (37 6 °C) 98 4 °F (36 9 °C) 98 9 °F (37 2 °C)   TempSrc:  Axillary Axillary Axillary   SpO2: 98% 100% 98% 98%   Weight:       Height:         Arterial Line BP: 136/62  Arterial Line MAP (mmHg): 84 mmHg    Temperature:   Temp (24hrs), Av 8 °F (37 1 °C), Min:97 8 °F (36 6 °C), Max:100 2 °F (37 9 °C)    Current: Temperature: 98 9 °F (37 2 °C)    Weights:   IBW: 54 7 kg    Body mass index is 21 99 kg/m²    Weight (last 2 days)     Date/Time   Weight    19 0500   58 1 (128 09) 07/02/19 0600   61 8 (136 24)              Hemodynamic Monitoring:  N/A     Non-Invasive/Invasive Ventilation Settings:  Respiratory    Lab Data (Last 4 hours)    None         O2/Vent Data (Last 4 hours)    None              No results found for: PHART, XNM0WTC, PO2ART, HYK8TJH, I0JNCSQX, BEART, SOURCE  SpO2: SpO2: 98 %    Intake and Outputs:  I/O       07/02 0701 - 07/03 0700 07/03 0701 - 07/04 0700 07/04 0701 - 07/05 0700    P  O  60 548     I V  (mL/kg) 1920 8 (33 1) 2664 (45 9)     NG/GT       IV Piggyback 0      Feedings       Total Intake(mL/kg) 1980 8 (34 1) 3212 (55 3)     Urine (mL/kg/hr) 1884 (1 4) 2625 (1 9)     Emesis/NG output 100 650     Total Output 1984 3275     Net -3 2 -63            Unmeasured Emesis Occurrence 7 x          UOP: 1 9/hour   Nutrition:        Diet Orders   (From admission, onward)            Start     Ordered    07/03/19 1651  Diet Surgical; Full Liquid  Diet effective now     Question Answer Comment   Diet Type Surgical    Surgical Full Liquid    RD to adjust diet per protocol?  Yes        07/03/19 1650    07/03/19 1647  Dietary nutrition supplements  Once     Question Answer Comment   Select Supplement: Ensure Clear Anjum Hammed    Select Supplement: Ensure Clear Apple    Frequency Breakfast, Lunch, Dinner        07/03/19 1647          Labs:   Results from last 7 days   Lab Units 06/30/19  0501 06/29/19 0439 06/29/19  0200   WBC Thousand/uL 15 57* 14 63* 14 65*   HEMOGLOBIN g/dL 9 9* 10 5* 10 5*   HEMATOCRIT % 30 0* 32 3* 31 9*   PLATELETS Thousands/uL 132* 140* 142*   NEUTROS PCT % 87* 82* 89*   MONOS PCT % 8 11 6    Results from last 7 days   Lab Units 07/01/19  0433 06/30/19  0501 06/29/19  0439 06/28/19  2309   SODIUM mmol/L 142 142 140  --    POTASSIUM mmol/L 3 8 4 0 4 6  --    CHLORIDE mmol/L 111* 112* 112*  --    CO2 mmol/L 29 26 23  --    CO2, I-STAT mmol/L  --   --   --  22   BUN mg/dL 19 18 12  --    CREATININE mg/dL 0 57* 0 67 0 69  --    CALCIUM mg/dL 7 8* 8 0* 7 3*  -- GLUCOSE, ISTAT mg/dl  --   --   --  157*     Results from last 7 days   Lab Units 07/01/19  0433 06/30/19  0501   MAGNESIUM mg/dL 2 2 2 3     Results from last 7 days   Lab Units 07/01/19  1828 07/01/19  0433 06/30/19  0501   PHOSPHORUS mg/dL 3 2 1 6* 1 1*          Results from last 7 days   Lab Units 06/29/19  0439   LACTIC ACID mmol/L 1 5     No results found for: TROPONINI    Imaging: I have personally reviewed pertinent reports  EKG: normal sinus rhythm    Micro:  Lab Results   Component Value Date    BLOODCX No Growth After 4 Days  06/29/2019    BLOODCX No Growth After 4 Days  06/29/2019    SPUTUMCULTUR No growth 07/01/2019       Allergies: No Known Allergies    Medications:   Scheduled Meds:  Current Facility-Administered Medications:  acetaminophen 650 mg Oral Q8H Roby Wesley MD    albuterol 2 5 mg Nebulization Q6H PRN Roby Wesley MD    chlorhexidine 30 mL Swish & Spit 5x Daily Yovani Robb DMD    heparin (porcine) 5,000 Units Subcutaneous Q8H [address] Roby Wesley MD    LORazepam 0 5 mg Intravenous BID PRN Yovani Robb DMD    metoclopramide 10 mg Intravenous Q6H PRN Roby Wesley MD    multi-electrolyte 100 mL/hr Intravenous Continuous Pecola Amas, CRNP Last Rate: 100 mL/hr (07/04/19 0411)   oxyCODONE 5 mg Oral Q4H PRN Keaton Nelson MD    promethazine 12 5 mg Intravenous Q12H PRN Yovani Robb DMD    scopolamine 1 patch Transdermal Q72H Pecola Amas, CRNP    senna 1 tablet Oral HS Roby Wesley MD    sodium chloride 1 spray Each Nare 4x Daily Roby Wesley MD      Continuous Infusions:  multi-electrolyte 100 mL/hr Last Rate: 100 mL/hr (07/04/19 0411)     PRN Meds:    albuterol 2 5 mg Q6H PRN   LORazepam 0 5 mg BID PRN   metoclopramide 10 mg Q6H PRN   oxyCODONE 5 mg Q4H PRN   promethazine 12 5 mg Q12H PRN       VTE Pharmacologic Prophylaxis: Heparin  VTE Mechanical Prophylaxis: sequential compression device    Invasive lines and devices:   Invasive Devices Central Venous Catheter Line            CVC Central Lines 06/30/19 Triple 20cm 3 days                    Code Status: Level 1 - Full Code     Portions of the record may have been created with voice recognition software  Occasional wrong word or "sound a like" substitutions may have occurred due to the inherent limitations of voice recognition software  Read the chart carefully and recognize, using context, where substitutions have occurred       Latanya Pal MD

## 2019-07-05 ENCOUNTER — APPOINTMENT (INPATIENT)
Dept: RADIOLOGY | Facility: HOSPITAL | Age: 19
DRG: 089 | End: 2019-07-05
Payer: COMMERCIAL

## 2019-07-05 PROBLEM — R74.8 ELEVATED LIVER ENZYMES: Status: ACTIVE | Noted: 2019-07-05

## 2019-07-05 PROBLEM — E87.6 HYPOKALEMIA: Status: ACTIVE | Noted: 2019-07-05

## 2019-07-05 LAB
ALBUMIN SERPL BCP-MCNC: 3.4 G/DL (ref 3.5–5)
ALP SERPL-CCNC: 51 U/L (ref 46–116)
ALT SERPL W P-5'-P-CCNC: 108 U/L (ref 12–78)
AMYLASE SERPL-CCNC: 44 IU/L (ref 25–115)
ANION GAP SERPL CALCULATED.3IONS-SCNC: 5 MMOL/L (ref 4–13)
AST SERPL W P-5'-P-CCNC: 70 U/L (ref 5–45)
ATRIAL RATE: 79 BPM
BILIRUB SERPL-MCNC: 0.58 MG/DL (ref 0.2–1)
BUN SERPL-MCNC: 21 MG/DL (ref 5–25)
CALCIUM SERPL-MCNC: 8.8 MG/DL (ref 8.3–10.1)
CHLORIDE SERPL-SCNC: 107 MMOL/L (ref 100–108)
CHOLEST SERPL-MCNC: 136 MG/DL (ref 50–200)
CK MB SERPL-MCNC: 2.6 NG/ML (ref 0–5)
CK MB SERPL-MCNC: <1 % (ref 0–2.5)
CK SERPL-CCNC: 415 U/L (ref 26–192)
CO2 SERPL-SCNC: 28 MMOL/L (ref 21–32)
CREAT SERPL-MCNC: 0.77 MG/DL (ref 0.6–1.3)
GFR SERPL CREATININE-BSD FRML MDRD: 112 ML/MIN/1.73SQ M
GLUCOSE SERPL-MCNC: 136 MG/DL (ref 65–140)
HDLC SERPL-MCNC: 45 MG/DL (ref 40–60)
LDLC SERPL CALC-MCNC: 67 MG/DL (ref 0–100)
LIPASE SERPL-CCNC: 159 U/L (ref 73–393)
MAGNESIUM SERPL-MCNC: 2.5 MG/DL (ref 1.6–2.6)
NONHDLC SERPL-MCNC: 91 MG/DL
P AXIS: 68 DEGREES
PHOSPHATE SERPL-MCNC: 2.9 MG/DL (ref 2.7–4.5)
POTASSIUM SERPL-SCNC: 2.9 MMOL/L (ref 3.5–5.3)
PR INTERVAL: 133 MS
PROT SERPL-MCNC: 6.6 G/DL (ref 6.4–8.2)
QRS AXIS: 93 DEGREES
QRSD INTERVAL: 92 MS
QT INTERVAL: 358 MS
QTC INTERVAL: 411 MS
SODIUM SERPL-SCNC: 140 MMOL/L (ref 136–145)
T WAVE AXIS: 69 DEGREES
TRIGL SERPL-MCNC: 121 MG/DL
VENTRICULAR RATE: 79 BPM

## 2019-07-05 PROCEDURE — 84100 ASSAY OF PHOSPHORUS: CPT | Performed by: INTERNAL MEDICINE

## 2019-07-05 PROCEDURE — 82150 ASSAY OF AMYLASE: CPT | Performed by: INTERNAL MEDICINE

## 2019-07-05 PROCEDURE — 82553 CREATINE MB FRACTION: CPT | Performed by: INTERNAL MEDICINE

## 2019-07-05 PROCEDURE — 93010 ELECTROCARDIOGRAM REPORT: CPT | Performed by: INTERNAL MEDICINE

## 2019-07-05 PROCEDURE — 83735 ASSAY OF MAGNESIUM: CPT | Performed by: INTERNAL MEDICINE

## 2019-07-05 PROCEDURE — 99252 IP/OBS CONSLTJ NEW/EST SF 35: CPT | Performed by: INTERNAL MEDICINE

## 2019-07-05 PROCEDURE — 82550 ASSAY OF CK (CPK): CPT | Performed by: INTERNAL MEDICINE

## 2019-07-05 PROCEDURE — 74018 RADEX ABDOMEN 1 VIEW: CPT

## 2019-07-05 PROCEDURE — C9113 INJ PANTOPRAZOLE SODIUM, VIA: HCPCS | Performed by: INTERNAL MEDICINE

## 2019-07-05 PROCEDURE — 80061 LIPID PANEL: CPT | Performed by: INTERNAL MEDICINE

## 2019-07-05 PROCEDURE — 83690 ASSAY OF LIPASE: CPT | Performed by: INTERNAL MEDICINE

## 2019-07-05 PROCEDURE — 80053 COMPREHEN METABOLIC PANEL: CPT | Performed by: ORAL & MAXILLOFACIAL SURGERY

## 2019-07-05 RX ORDER — PANTOPRAZOLE SODIUM 40 MG/1
40 INJECTION, POWDER, FOR SOLUTION INTRAVENOUS EVERY 12 HOURS SCHEDULED
Status: COMPLETED | OUTPATIENT
Start: 2019-07-05 | End: 2019-07-07

## 2019-07-05 RX ORDER — METOCLOPRAMIDE HYDROCHLORIDE 5 MG/ML
5 INJECTION INTRAMUSCULAR; INTRAVENOUS EVERY 6 HOURS PRN
Status: DISCONTINUED | OUTPATIENT
Start: 2019-07-05 | End: 2019-07-08 | Stop reason: HOSPADM

## 2019-07-05 RX ORDER — HYDROMORPHONE HCL/PF 1 MG/ML
0.2 SYRINGE (ML) INJECTION ONCE
Status: COMPLETED | OUTPATIENT
Start: 2019-07-05 | End: 2019-07-05

## 2019-07-05 RX ORDER — POTASSIUM CHLORIDE 14.9 MG/ML
20 INJECTION INTRAVENOUS
Status: DISCONTINUED | OUTPATIENT
Start: 2019-07-05 | End: 2019-07-05

## 2019-07-05 RX ORDER — DEXTROSE, SODIUM CHLORIDE, AND POTASSIUM CHLORIDE 5; .9; .15 G/100ML; G/100ML; G/100ML
50 INJECTION INTRAVENOUS CONTINUOUS
Status: DISCONTINUED | OUTPATIENT
Start: 2019-07-05 | End: 2019-07-06

## 2019-07-05 RX ORDER — BISACODYL 10 MG
10 SUPPOSITORY, RECTAL RECTAL ONCE
Status: COMPLETED | OUTPATIENT
Start: 2019-07-05 | End: 2019-07-05

## 2019-07-05 RX ADMIN — CHLORHEXIDINE GLUCONATE 0.12% ORAL RINSE 30 ML: 1.2 LIQUID ORAL at 21:06

## 2019-07-05 RX ADMIN — LORAZEPAM 0.5 MG: 2 INJECTION INTRAMUSCULAR; INTRAVENOUS at 23:02

## 2019-07-05 RX ADMIN — Medication 1 SPRAY: at 12:22

## 2019-07-05 RX ADMIN — POTASSIUM CHLORIDE 20 MEQ: 200 INJECTION, SOLUTION INTRAVENOUS at 14:56

## 2019-07-05 RX ADMIN — Medication 1 SPRAY: at 17:03

## 2019-07-05 RX ADMIN — POTASSIUM CHLORIDE 20 MEQ: 200 INJECTION, SOLUTION INTRAVENOUS at 17:02

## 2019-07-05 RX ADMIN — HEPARIN SODIUM 5000 UNITS: 5000 INJECTION INTRAVENOUS; SUBCUTANEOUS at 21:08

## 2019-07-05 RX ADMIN — HYDROMORPHONE HYDROCHLORIDE 0.2 MG: 1 INJECTION, SOLUTION INTRAMUSCULAR; INTRAVENOUS; SUBCUTANEOUS at 18:29

## 2019-07-05 RX ADMIN — Medication 1 SPRAY: at 08:21

## 2019-07-05 RX ADMIN — SODIUM CHLORIDE 250 ML: 0.9 INJECTION, SOLUTION INTRAVENOUS at 17:10

## 2019-07-05 RX ADMIN — METOCLOPRAMIDE 10 MG: 5 INJECTION, SOLUTION INTRAMUSCULAR; INTRAVENOUS at 09:15

## 2019-07-05 RX ADMIN — DEXTROSE, SODIUM CHLORIDE, AND POTASSIUM CHLORIDE 50 ML/HR: 5; .9; .15 INJECTION INTRAVENOUS at 18:11

## 2019-07-05 RX ADMIN — CHLORHEXIDINE GLUCONATE 0.12% ORAL RINSE 30 ML: 1.2 LIQUID ORAL at 17:02

## 2019-07-05 RX ADMIN — HEPARIN SODIUM 5000 UNITS: 5000 INJECTION INTRAVENOUS; SUBCUTANEOUS at 06:13

## 2019-07-05 RX ADMIN — HEPARIN SODIUM 5000 UNITS: 5000 INJECTION INTRAVENOUS; SUBCUTANEOUS at 14:56

## 2019-07-05 RX ADMIN — BISACODYL 10 MG: 10 SUPPOSITORY RECTAL at 14:54

## 2019-07-05 RX ADMIN — Medication 1 SPRAY: at 21:07

## 2019-07-05 RX ADMIN — CHLORHEXIDINE GLUCONATE 0.12% ORAL RINSE 30 ML: 1.2 LIQUID ORAL at 14:56

## 2019-07-05 RX ADMIN — CHLORHEXIDINE GLUCONATE 0.12% ORAL RINSE 30 ML: 1.2 LIQUID ORAL at 06:14

## 2019-07-05 RX ADMIN — METOCLOPRAMIDE 5 MG: 5 INJECTION, SOLUTION INTRAMUSCULAR; INTRAVENOUS at 22:57

## 2019-07-05 RX ADMIN — PANTOPRAZOLE SODIUM 40 MG: 40 INJECTION, POWDER, FOR SOLUTION INTRAVENOUS at 21:06

## 2019-07-05 RX ADMIN — CHLORHEXIDINE GLUCONATE 0.12% ORAL RINSE 30 ML: 1.2 LIQUID ORAL at 10:19

## 2019-07-05 RX ADMIN — METOCLOPRAMIDE 10 MG: 5 INJECTION, SOLUTION INTRAMUSCULAR; INTRAVENOUS at 03:15

## 2019-07-05 RX ADMIN — PROMETHAZINE HYDROCHLORIDE 12.5 MG: 25 INJECTION INTRAMUSCULAR; INTRAVENOUS at 18:16

## 2019-07-05 NOTE — PLAN OF CARE
Problem: Prexisting or High Potential for Compromised Skin Integrity  Goal: Skin integrity is maintained or improved  Description  INTERVENTIONS:  - Identify patients at risk for skin breakdown  - Assess and monitor skin integrity  - Assess and monitor nutrition and hydration status  - Monitor labs (i e  albumin)  - Assess for incontinence   - Turn and reposition patient  - Assist with mobility/ambulation  - Relieve pressure over bony prominences  - Avoid friction and shearing  - Provide appropriate hygiene as needed including keeping skin clean and dry  - Evaluate need for skin moisturizer/barrier cream  - Collaborate with interdisciplinary team (i e  Nutrition, Rehabilitation, etc )   - Patient/family teaching  Outcome: Progressing     Problem: Potential for Falls  Goal: Patient will remain free of falls  Description  INTERVENTIONS:  - Assess patient frequently for physical needs  -  Identify cognitive and physical deficits and behaviors that affect risk of falls    -  [place] fall precautions as indicated by assessment   - Educate patient/family on patient safety including physical limitations  - Instruct patient to call for assistance with activity based on assessment  - Modify environment to reduce risk of injury  - Consider OT/PT consult to assist with strengthening/mobility  Outcome: Progressing     Problem: PAIN - ADULT  Goal: Verbalizes/displays adequate comfort level or baseline comfort level  Description  Interventions:  - Encourage patient to monitor pain and request assistance  - Assess pain using appropriate pain scale  - Administer analgesics based on type and severity of pain and evaluate response  - Implement non-pharmacological measures as appropriate and evaluate response  - Consider cultural and social influences on pain and pain management  - Notify physician/advanced practitioner if interventions unsuccessful or patient reports new pain  Outcome: Progressing     Problem: INFECTION - ADULT  Goal: Absence or prevention of progression during hospitalization  Description  INTERVENTIONS:  - Assess and monitor for signs and symptoms of infection  - Monitor lab/diagnostic results  - Monitor all insertion sites, i e  indwelling lines, tubes, and drains  - Monitor endotracheal (as able) and nasal secretions for changes in amount and color  - [place] appropriate cooling/warming therapies per order  - Administer medications as ordered  - Instruct and encourage patient and family to use good hand hygiene technique  - Identify and instruct in appropriate isolation precautions for identified infection/condition  Outcome: Progressing  Goal: Absence of fever/infection during neutropenic period  Description  INTERVENTIONS:  - Monitor WBC  - Implement neutropenic guidelines  Outcome: Progressing     Problem: SAFETY ADULT  Goal: Patient will remain free of falls  Description  INTERVENTIONS:  - Assess patient frequently for physical needs  -  Identify cognitive and physical deficits and behaviors that affect risk of falls    -  [place] fall precautions as indicated by assessment   - Educate patient/family on patient safety including physical limitations  - Instruct patient to call for assistance with activity based on assessment  - Modify environment to reduce risk of injury  - Consider OT/PT consult to assist with strengthening/mobility  Outcome: Progressing  Goal: Maintain or return to baseline ADL function  Description  INTERVENTIONS:  -  Assess patient's ability to carry out ADLs; assess patient's baseline for ADL function and identify physical deficits which impact ability to perform ADLs (bathing, care of mouth/teeth, toileting, grooming, dressing, etc )  - Assess/evaluate cause of self-care deficits   - Assess range of motion  - Assess patient's mobility; develop plan if impaired  - Assess patient's need for assistive devices and provide as appropriate  - Encourage maximum independence but intervene and supervise when necessary  ¯ Involve family in performance of ADLs  ¯ Assess for home care needs following discharge   ¯ Request OT consult to assist with ADL evaluation and planning for discharge  ¯ Provide patient education as appropriate  Outcome: Progressing  Goal: Maintain or return mobility status to optimal level  Description  INTERVENTIONS:  - Assess patient's baseline mobility status (ambulation, transfers, stairs, etc )    - Identify cognitive and physical deficits and behaviors that affect mobility  - Identify mobility aids required to assist with transfers and/or ambulation (gait belt, sit-to-stand, lift, walker, cane, etc )  - [place] fall precautions as indicated by assessment  - Record patient progress and toleration of activity level on Mobility SBAR; progress patient to next Phase/Stage  - Instruct patient to call for assistance with activity based on assessment  - Request Rehabilitation consult to assist with strengthening/weightbearing, etc   Outcome: Progressing     Problem: DISCHARGE PLANNING  Goal: Discharge to home or other facility with appropriate resources  Description  INTERVENTIONS:  - Identify barriers to discharge w/patient and caregiver  - Arrange for needed discharge resources and transportation as appropriate  - Identify discharge learning needs (meds, wound care, etc )  - Arrange for interpretive services to assist at discharge as needed  - Refer to Case Management Department for coordinating discharge planning if the patient needs post-hospital services based on physician/advanced practitioner order or complex needs related to functional status, cognitive ability, or social support system  Outcome: Progressing     Problem: Knowledge Deficit  Goal: Patient/family/caregiver demonstrates understanding of disease process, treatment plan, medications, and discharge instructions  Description  Complete learning assessment and assess knowledge base    Interventions:  - Provide teaching at level of understanding  - Provide teaching via preferred learning methods  Outcome: Progressing     Problem: COPING  Goal: Will report anxiety at manageable levels  Description  INTERVENTIONS:  - Administer medication as ordered  - Teach and encourage coping skills  - Provide emotional support  - Assess patient/family for anxiety and ability to cope  Outcome: Progressing     Problem: Nutrition/Hydration-ADULT  Goal: Nutrient/Hydration intake appropriate for improving, restoring or maintaining nutritional needs  Description  Monitor and assess patient's nutrition/hydration status for malnutrition (ex- brittle hair, bruises, dry skin, pale skin and conjunctiva, muscle wasting, smooth red tongue, and disorientation)  Collaborate with interdisciplinary team and initiate plan and interventions as ordered  Monitor patient's weight and dietary intake as ordered or per policy  Utilize nutrition screening tool and intervene per policy  Determine patient's food preferences and provide high-protein, high-caloric foods as appropriate       INTERVENTIONS:  - Monitor oral intake, urinary output, labs, and treatment plans  - Assess nutrition and hydration status and recommend course of action  - Evaluate amount of meals eaten  - Assist patient with eating if necessary   - Allow adequate time for meals  - Recommend/ encourage appropriate diets, oral nutritional supplements, and vitamin/mineral supplements  - Order, calculate, and assess calorie counts as needed  - Recommend, monitor, and adjust tube feedings and TPN/PPN based on assessed needs  - Assess need for intravenous fluids  - Provide specific nutrition/hydration education as appropriate  - Include patient/family/caregiver in decisions related to nutrition  Outcome: Progressing

## 2019-07-05 NOTE — PROGRESS NOTES
[facility] Progress Note  POD#7     21 y/o F s/p elective bimaxillary surgery  She is ambulating, voiding, able to have PO full liquids- however she has been having episodes of vomiting  Today, she complains of abdominal pain  She has been passing gas- but no stool since surgery  KUB x-ray from 2 days ago was normal  Chem study revealed AST/ALT elevated, and low K        P/E  GUS: resolving swelling to midface  sutures to B/L cheeks clean and intact  Nose: dried blood to nostrils, patent airway  Mouth: good intercuspidation in the occlusal splint  Sutures to upper/lower vestibule intact and hemostatic  No wound dehiscence  Neck: improving swelling to lower face, as expected   R IJ in place      CVS: RRR, S1/S2  CHEST: CTA B/L  ABD: +BS, sluggish  + tenderness to URQ, worse on inspiration          A:   s/p bimaxillary jaw surgery- no surgical issues   Nausea and vomiting with RUQ pain, elevated AST/ALT  Moderate hypokalemia likely 2/2 vomiting     Plan:    Facial Surgery  OOB, encourage hourly ambulation  Nasal saline spray 4x/day  Tooth brushing and chlorhexidine rinse 5x/day  Vaseline to lips    Nausea/Vomiting  Medicine consult regarding N/V, abdominal pain  Bisacodyl suppository  Reglan/ Pherenergan PRN    Nutrition:  PO feeding goal: 60cc/ hour with Marcel syringe  IV fluids to cover deficit     Pain management:  Tylenol PO q4h  No narcotics

## 2019-07-05 NOTE — CONSULTS
Consult- Yoni Mckeon 2000, 20 y o  female MRN: [id number]    Unit/Bed#: -01 Encounter: [id number]    Primary Care Provider: Nolvia Pinto MD   Date and time admitted to hospital: 6/28/2019  6:20 AM      Inpatient consult to Internal Medicine  Consult performed by: Jaylan Malin MD  Consult ordered by: Stu Guerrero DMD          Elevated liver enzymes  Assessment & Plan  This might be due to propofol use during her surgery, will monitor daily to see if she improves  Hypokalemia  Assessment & Plan  Hypokalemia due to vomiting, this might be related   Potassium was burning her, so we started D5 normal saline with potassium   Morning labs  Repeat mag, phos today    Post-operative nausea and vomiting  Assessment & Plan  This might be due to post anesthesia and pain medication withdrawal  She is off pain medications now  Not taking opioids  This might be due to ensure as well, it sometimes causes indigestion, would hold off tonight to see if this is causing any issues  Monitor liver function tests closely as this might be related to propofol administration, will closely monitor  Monitor electrolytes, K, Mg, Phos and replete  Unsure why Zofran did not work, Lucy Maryanle works, should be limited use due to side effects  phenergan at night for sleeping is okay  Keep NPO for now  Passing flatulence  Did not have bowel movement, receive suppository  Monitor closely with supportive care  I would consider acute pain services consult, better pain control, unsure if she is pain and subsequent nausea, she says pain is 7/10, abdominal cramping  * Skeletal deformity of jaw  Assessment & Plan  S/p surgery  Per primary team    VTE Prophylaxis: Sequential compression device (Venodyne)   / sequential compression device      Recommendations for Discharge:  · pending     Counseling / Coordination of Care Time: 30 minutes    Greater than 50% of total time spent on patient counseling and coordination of care      Collaboration of Care: Were Recommendations Directly Discussed with Primary Treatment Team? - Yes      History of Present Illness:     Demarcus Kevin is a 20 y o  female who is originally admitted to the oral surgery service due to Jaw repair  We are consulted for nausea and vomiting on going  48-year-old female no previous history of any medical issues, complaining of nausea and vomiting  She has been vomiting more frequently after extubation  She has improved today  Still having more nausea and vomiting x2 today  There is no blood in it  Since her jaw is wired shut, she has been eating liquid diet  It includes Ensure  She has also did not have a bowel movement for couple a days  During surgery he have used propofol, she was on fentanyl for pain        Her pain at this time is about 7/10, she endorsed that pain is located in the mid belly area  We will monitor patient for her nausea vomiting, hypokalemia       Review of Systems:     Review of Systems   Gastrointestinal: Positive for abdominal distention, abdominal pain, constipation, nausea and vomiting          Past Medical and Surgical History:      Medical History   History reviewed   No pertinent past medical history         Surgical History         Past Surgical History:   Procedure Laterality Date    MAXILLARY LE FORTE OSTEOTOMY Bilateral 6/28/2019     Procedure: GENIOPLASTY, LEFORTE I, BILATERAL SAGITAL SPLIT RAMUS OSTEOTOMY, SEPTOPLASTY, INFERIOR TURBINATE REDUCTION;  Surgeon: Trey Justice DMD;  [facility];  Service: Maxillofacial    CA BRONCHOSCOPY,DIAGNOSTIC N/A 6/28/2019     Procedure: BRONCHOSCOPY FLEXIBLE, CONTROLLED INTUBATION;  Surgeon: Hans Young DO;  [facility];  Service: General    WISDOM TOOTH EXTRACTION                Meds/Allergies:     all medications and allergies reviewed     Allergies: No Known Allergies     Social History:     Marital Status: Unknown     Substance Use History: Social History           Substance and Sexual Activity   Alcohol Use Never    Frequency: Never      Social History          Tobacco Use   Smoking Status Never Smoker   Smokeless Tobacco Never Used      Social History          Substance and Sexual Activity   Drug Use Never         Family History:     History reviewed  No pertinent family history      Physical Exam:      Vitals:   Blood Pressure: 125/79 (07/05/19 1553)  Pulse: 69 (07/05/19 1553)  Temperature: 99 1 °F (37 3 °C) (07/05/19 1553)  Temp Source: Axillary (07/05/19 1553)  Respirations: 17 (07/05/19 1553)  Height: 5' 4" (162 6 cm) (06/30/19 1328)  Weight - Scale: 58 1 kg (128 lb 1 4 oz) (07/03/19 0500)  SpO2: 100 % (07/05/19 1553)     Physical Exam   Constitutional: She is oriented to person, place, and time  HENT:   Head: Normocephalic and atraumatic  Nose: Nose normal    Mouth/Throat: No oropharyngeal exudate  Jaw wired shut   Cardiovascular: Normal rate, regular rhythm, normal heart sounds and intact distal pulses  Pulmonary/Chest: Effort normal and breath sounds normal  No stridor  No respiratory distress  She has no wheezes  She has no rales  She exhibits no tenderness  Abdominal: Soft  Bowel sounds are normal  She exhibits no distension and no mass  There is no tenderness  There is no guarding  Musculoskeletal: Normal range of motion  She exhibits no edema, tenderness or deformity  Neurological: She is alert and oriented to person, place, and time  Skin: Skin is warm and dry     Nursing note and vitals reviewed               Additional Data:      Lab Results: I have personally reviewed pertinent reports               Results from last 7 days   Lab Units 06/30/19  0501   06/28/19 2058   WBC Thousand/uL 15 57*   < > 11 36*   HEMOGLOBIN g/dL 9 9*   < > 8 9*   I STAT HEMOGLOBIN    --    < >  --    HEMATOCRIT % 30 0*   < > 27 7*   HEMATOCRIT, ISTAT    --    < >  --    PLATELETS Thousands/uL 132*   < > 142*   BANDS PCT %  --   --  26* NEUTROS PCT % 87*   < >  --    LYMPHS PCT % 4*   < >  --    LYMPHO PCT %  --   --  6*   MONOS PCT % 8   < >  --    MONO PCT %  --   --  4   EOS PCT % 0   < > 0    < > = values in this interval not displayed            Results from last 7 days   Lab Units 07/05/19  0833   SODIUM mmol/L 140   POTASSIUM mmol/L 2 9*   CHLORIDE mmol/L 107   CO2 mmol/L 28   BUN mg/dL 21   CREATININE mg/dL 0 77   ANION GAP mmol/L 5   CALCIUM mg/dL 8 8   ALBUMIN g/dL 3 4*   TOTAL BILIRUBIN mg/dL 0 58   ALK PHOS U/L 51   ALT U/L 108*   AST U/L 70*   GLUCOSE RANDOM mg/dL 136              No results found for: HGBA1C               Results from last 7 days   Lab Units 07/01/19  0433 06/29/19  0834 06/29/19  0439 06/29/19  0047 06/28/19  2058   LACTIC ACID mmol/L  --   --  1 5 4 3* 7 2*   PROCALCITONIN ng/ml 0 87* 1 71*  --   --   --          Imaging: I have personally reviewed pertinent reports        XR abdomen 1 view kub   Final Result by Franklin Mckeon DO (07/04 1233)   No evidence of intestinal obstruction        Workstation performed: [id number]           XR chest portable   Final Result by Ugo Vidal MD (07/02 1118)       1  Interval extubation and removal of the enteric tube        2  No substantial interval change in hazy right lower lung zone opacity        Workstation performed: [id number]           XR abdomen 1 vw portable   Final Result by Jimena Oneill MD (06/30 1245)       Reduction of redundant loop of nasogastric tube                  Workstation performed: [id number]           XR abdomen 1 vw portable   Final Result by Jimena Oneill MD (06/30 1236)       Nasogastric tube is at least within the stomach, if not more distal   It may be distending the stomach    Withdrawing the tube up to 16 cm would probably reduce the redundant loop, and still be the side port within the stomach                    Workstation performed: [id number]           XR chest portable   Final Result by Jimena Oneill MD (06/30 1237)     No complication after line placement               Workstation performed: [id number]           XR chest portable   Final Result by Geovanna Dleuca MD (06/30 1118)       Resolution of pulmonary infiltrates since 6/29/2019                Workstation performed: [id number]           XR chest 1 view portable   Final Result by Ronaldo Epperson DO (06/30 9300)   1  Improving bilateral upper lobe infiltrates  2   Slight progression of right lower lobe infiltrate        Workstation performed: [id number]           XR chest 1 view portable   Final Result by Brett Keenan MD (06/29 1111)       Unable to confidently identify the endotracheal tube and a patient with bilateral lung disease       Subsequent film obtained, the next morning, does show endotracheal tube in good position                Workstation performed: [id number]           XR chest 1 view portable   Final Result by Tian Sanchez MD (06/28 1934)       Bilateral pulmonary alveolar infiltrates with underlying slight vascular accentuation  Findings may be representative of ARDS        The study was marked in [facility] for immediate notification        Workstation performed: [id number]                 ·       ** Please Note: This note has been constructed using a voice recognition system   **

## 2019-07-05 NOTE — RESPIRATORY THERAPY NOTE
resp care      07/05/19 0836   Respiratory Protocol   Respiratory Plan Discontinue Protocol   Respiratory Assessment   Assessment Type Assess only   General Appearance Awake; Alert   Respiratory Pattern Normal   Chest Assessment Chest expansion symmetrical   Bilateral Breath Sounds Clear   Resp Comments Pt post op vent/jaw surgery  Pt walking halls  Pt unable to speak, but denies any resp issues  Will d/c prn udn and d/c pt from resp protocol

## 2019-07-05 NOTE — ASSESSMENT & PLAN NOTE
Hypokalemia due to vomiting, this might be related   Potassium was burning her, so we started D5 normal saline with potassium   Morning labs  Repeat mag, phos today

## 2019-07-05 NOTE — ASSESSMENT & PLAN NOTE
This might be due to post anesthesia and pain medication withdrawal  She is off pain medications now  Not taking opioids  This might be due to ensure as well, it sometimes causes indigestion, would hold off tonight to see if this is causing any issues  Monitor liver function tests closely as this might be related to propofol administration, will closely monitor  Monitor electrolytes, K, Mg, Phos and replete  Unsure why Zofran did not work, Lakim Lafleurmp works, should be limited use due to side effects  phenergan at night for sleeping is okay  Keep NPO for now  Passing flatulence  Did not have bowel movement, receive suppository  Monitor closely with supportive care  I would consider acute pain services consult, better pain control, unsure if she is pain and subsequent nausea, she says pain is 7/10, abdominal cramping

## 2019-07-05 NOTE — PLAN OF CARE
Problem: Nutrition/Hydration-ADULT  Goal: Nutrient/Hydration intake appropriate for improving, restoring or maintaining nutritional needs  Description  Monitor and assess patient's nutrition/hydration status for malnutrition (ex- brittle hair, bruises, dry skin, pale skin and conjunctiva, muscle wasting, smooth red tongue, and disorientation)  Collaborate with interdisciplinary team and initiate plan and interventions as ordered  Monitor patient's weight and dietary intake as ordered or per policy  Utilize nutrition screening tool and intervene per policy  Determine patient's food preferences and provide high-protein, high-caloric foods as appropriate       INTERVENTIONS:  - Monitor oral intake, urinary output, labs, and treatment plans  - Assess nutrition and hydration status and recommend course of action  - Evaluate amount of meals eaten  - Assist patient with eating if necessary   - Allow adequate time for meals  - Recommend/ encourage appropriate diets, oral nutritional supplements, and vitamin/mineral supplements  - Order, calculate, and assess calorie counts as needed  - Recommend, monitor, and adjust tube feedings and TPN/PPN based on assessed needs  - Assess need for intravenous fluids  - Provide specific nutrition/hydration education as appropriate  - Include patient/family/caregiver in decisions related to nutrition  Outcome: Not Progressing  Note:   Sub-optimal PO

## 2019-07-05 NOTE — H&P
H&P- Mari Harris 2000, 20 y o  female MRN: [id number]    Unit/Bed#: -01 Encounter: [id number]    Primary Care Provider: Uyen Avila MD   Date and time admitted to hospital: 6/28/2019  6:20 AM        Elevated liver enzymes  Assessment & Plan  This might be due to propofol use during her surgery, will monitor daily to see if she improves  Hypokalemia  Assessment & Plan  Hypokalemia due to vomiting, this might be related   Potassium was burning her, so we started D5 normal saline with potassium   Morning labs  Repeat mag, phos today    Post-operative nausea and vomiting  Assessment & Plan  This might be due to post anesthesia and pain medication withdrawal  She is off pain medications now  Not taking opioids  This might be due to ensure as well, it sometimes causes indigestion, would hold off tonight to see if this is causing any issues  Monitor liver function tests closely as this might be related to propofol administration, will closely monitor  Monitor electrolytes, K, Mg, Phos and replete  Unsure why Zofran did not work, Bigg Pilarshanel works, should be limited use due to side effects  phenergan at night for sleeping is okay  Keep NPO for now  Passing flatulence  Did not have bowel movement, receive suppository  Monitor closely with supportive care  I would consider acute pain services consult, better pain control, unsure if she is pain and subsequent nausea, she says pain is 7/10, abdominal cramping  * Skeletal deformity of jaw  Assessment & Plan  S/p surgery  Per primary team      VTE Prophylaxis: Sequential compression device (Venodyne)   / sequential compression device     Recommendations for Discharge:  · pending    Counseling / Coordination of Care Time: 30 minutes  Greater than 50% of total time spent on patient counseling and coordination of care  Collaboration of Care:  Were Recommendations Directly Discussed with Primary Treatment Team? - Yes     History of Present Illness:    Oneil Burgess is a 20 y o  female who is originally admitted to the oral surgery service due to Jaw repair  We are consulted for nausea and vomiting on going  79-year-old female no previous history of any medical issues, complaining of nausea and vomiting  She has been vomiting more frequently after extubation  She has improved today  Still having more nausea and vomiting x2 today  There is no blood in it  Since her jaw is wired shut, she has been eating liquid diet  It includes Ensure  She has also did not have a bowel movement for couple a days  During surgery he have used propofol, she was on fentanyl for pain  Her pain at this time is about 7/10, she endorsed that pain is located in the mid belly area  We will monitor patient for her nausea vomiting, hypokalemia  Review of Systems:    Review of Systems   Gastrointestinal: Positive for abdominal distention, abdominal pain, constipation, nausea and vomiting  Past Medical and Surgical History:     History reviewed  No pertinent past medical history      Past Surgical History:   Procedure Laterality Date    MAXILLARY LE FORTE OSTEOTOMY Bilateral 6/28/2019    Procedure: GENIOPLASTY, LEFORTE I, BILATERAL SAGITAL SPLIT RAMUS OSTEOTOMY, SEPTOPLASTY, INFERIOR TURBINATE REDUCTION;  Surgeon: Taran Shaw DMD;  [facility];  Service: Maxillofacial    MN BRONCHOSCOPY,DIAGNOSTIC N/A 6/28/2019    Procedure: BRONCHOSCOPY FLEXIBLE, CONTROLLED INTUBATION;  Surgeon: Angel Candelario DO;  [facility];  Service: General    WISDOM TOOTH EXTRACTION         Meds/Allergies:    all medications and allergies reviewed    Allergies: No Known Allergies    Social History:     Marital Status: Unknown    Substance Use History:   Social History     Substance and Sexual Activity   Alcohol Use Never    Frequency: Never     Social History     Tobacco Use   Smoking Status Never Smoker   Smokeless Tobacco Never Used     Social History Substance and Sexual Activity   Drug Use Never       Family History:    History reviewed  No pertinent family history  Physical Exam:     Vitals:   Blood Pressure: 125/79 (07/05/19 1553)  Pulse: 69 (07/05/19 1553)  Temperature: 99 1 °F (37 3 °C) (07/05/19 1553)  Temp Source: Axillary (07/05/19 1553)  Respirations: 17 (07/05/19 1553)  Height: 5' 4" (162 6 cm) (06/30/19 1328)  Weight - Scale: 58 1 kg (128 lb 1 4 oz) (07/03/19 0500)  SpO2: 100 % (07/05/19 1553)    Physical Exam   Constitutional: She is oriented to person, place, and time  HENT:   Head: Normocephalic and atraumatic  Nose: Nose normal    Mouth/Throat: No oropharyngeal exudate  Jaw wired shut   Cardiovascular: Normal rate, regular rhythm, normal heart sounds and intact distal pulses  Pulmonary/Chest: Effort normal and breath sounds normal  No stridor  No respiratory distress  She has no wheezes  She has no rales  She exhibits no tenderness  Abdominal: Soft  Bowel sounds are normal  She exhibits no distension and no mass  There is no tenderness  There is no guarding  Musculoskeletal: Normal range of motion  She exhibits no edema, tenderness or deformity  Neurological: She is alert and oriented to person, place, and time  Skin: Skin is warm and dry  Nursing note and vitals reviewed  Additional Data:     Lab Results: I have personally reviewed pertinent reports  Results from last 7 days   Lab Units 06/30/19  0501  06/28/19  2058   WBC Thousand/uL 15 57*   < > 11 36*   HEMOGLOBIN g/dL 9 9*   < > 8 9*   I STAT HEMOGLOBIN   --    < >  --    HEMATOCRIT % 30 0*   < > 27 7*   HEMATOCRIT, ISTAT   --    < >  --    PLATELETS Thousands/uL 132*   < > 142*   BANDS PCT %  --   --  26*   NEUTROS PCT % 87*   < >  --    LYMPHS PCT % 4*   < >  --    LYMPHO PCT %  --   --  6*   MONOS PCT % 8   < >  --    MONO PCT %  --   --  4   EOS PCT % 0   < > 0    < > = values in this interval not displayed       Results from last 7 days   Lab Units 07/05/19  0833   SODIUM mmol/L 140   POTASSIUM mmol/L 2 9*   CHLORIDE mmol/L 107   CO2 mmol/L 28   BUN mg/dL 21   CREATININE mg/dL 0 77   ANION GAP mmol/L 5   CALCIUM mg/dL 8 8   ALBUMIN g/dL 3 4*   TOTAL BILIRUBIN mg/dL 0 58   ALK PHOS U/L 51   ALT U/L 108*   AST U/L 70*   GLUCOSE RANDOM mg/dL 136             No results found for: HGBA1C      Results from last 7 days   Lab Units 07/01/19  0433 06/29/19  0834 06/29/19  0439 06/29/19  0047 06/28/19  2058   LACTIC ACID mmol/L  --   --  1 5 4 3* 7 2*   PROCALCITONIN ng/ml 0 87* 1 71*  --   --   --        Imaging: I have personally reviewed pertinent reports  XR abdomen 1 view kub   Final Result by Samuel Mendoza DO (07/04 1233)   No evidence of intestinal obstruction  Workstation performed: [id number]         XR chest portable   Final Result by Angelika Larsen MD (07/02 1118)      1  Interval extubation and removal of the enteric tube  2   No substantial interval change in hazy right lower lung zone opacity  Workstation performed: [id number]         XR abdomen 1 vw portable   Final Result by Maria D Hatch MD (06/30 1245)      Reduction of redundant loop of nasogastric tube  Workstation performed: [id number]         XR abdomen 1 vw portable   Final Result by Maria D Hatch MD (06/30 1239)      Nasogastric tube is at least within the stomach, if not more distal   It may be distending the stomach  Withdrawing the tube up to 16 cm would probably reduce the redundant loop, and still be the side port within the stomach  Workstation performed: [id number]         XR chest portable   Final Result by Maria D Hatch MD (06/30 1333)      No complication after line placement            Workstation performed: [id number]         XR chest portable   Final Result by Zeyad Dinero MD (06/30 1118)      Resolution of pulmonary infiltrates since 6/29/2019              Workstation performed: [id number]         XR chest 1 view portable   Final Result by Obdulia Vizcarra DO (06/30 4386)   1  Improving bilateral upper lobe infiltrates  2   Slight progression of right lower lobe infiltrate  Workstation performed: [id number]         XR chest 1 view portable   Final Result by Janella Dakin, MD (06/29 1111)      Unable to confidently identify the endotracheal tube and a patient with bilateral lung disease      Subsequent film obtained, the next morning, does show endotracheal tube in good position  Workstation performed: [id number]         XR chest 1 view portable   Final Result by Keara Lorenzo MD (06/28 1934)      Bilateral pulmonary alveolar infiltrates with underlying slight vascular accentuation  Findings may be representative of ARDS  The study was marked in [facility] for immediate notification  Workstation performed: [id number]             ·      ** Please Note: This note has been constructed using a voice recognition system   **

## 2019-07-05 NOTE — MALNUTRITION/BMI
This medical record reflects one or more clinical indicators suggestive of malnutrition  Malnutrition Findings:      Degree of Malnutrition: Malnutrition of mild degree  Malnutrition Characteristics: Inadequate energy, Fluid accumulation(energy intake meeting less than 50% needs for greater than 3 days and non pitting bilateral upper and lower extremity edema; treated with full liquid diet and oral nutrition supplements)        See Nutrition note dated 7/5/19 for additional details  Completed nutrition assessment is viewable in the nutrition documentation

## 2019-07-05 NOTE — ASSESSMENT & PLAN NOTE
This might be due to post anesthesia and pain medication withdrawal  She is off pain medications now  Not taking opioids  This might be due to ensure as well, it sometimes causes indigestion, would hold off tonight to see if this is causing any issues  Monitor liver function tests closely as this might be related to propofol administration, will closely monitor  Monitor electrolytes, K, Mg, Phos and replete  Unsure why Zofran did not work, Mabeline Jesu works, should be limited use due to side effects  phenergan at night for sleeping is okay  Keep NPO for now  Passing flatulence  Did not have bowel movement, receive suppository  Monitor closely with supportive care  I would consider acute pain services consult, better pain control, unsure if she is pain and subsequent nausea, she says pain is 7/10, abdominal cramping

## 2019-07-06 ENCOUNTER — APPOINTMENT (INPATIENT)
Dept: RADIOLOGY | Facility: HOSPITAL | Age: 19
DRG: 089 | End: 2019-07-06
Payer: COMMERCIAL

## 2019-07-06 PROBLEM — K81.0 CHOLECYSTITIS, ACUTE: Status: ACTIVE | Noted: 2019-07-06

## 2019-07-06 LAB
ALBUMIN SERPL BCP-MCNC: 3.1 G/DL (ref 3.5–5)
ALP SERPL-CCNC: 46 U/L (ref 46–116)
ALT SERPL W P-5'-P-CCNC: 137 U/L (ref 12–78)
ANION GAP SERPL CALCULATED.3IONS-SCNC: 6 MMOL/L (ref 4–13)
AST SERPL W P-5'-P-CCNC: 78 U/L (ref 5–45)
BASOPHILS # BLD AUTO: 0.01 THOUSANDS/ΜL (ref 0–0.1)
BASOPHILS NFR BLD AUTO: 0 % (ref 0–1)
BILIRUB SERPL-MCNC: 0.65 MG/DL (ref 0.2–1)
BUN SERPL-MCNC: 20 MG/DL (ref 5–25)
CALCIUM SERPL-MCNC: 8.5 MG/DL (ref 8.3–10.1)
CHLORIDE SERPL-SCNC: 113 MMOL/L (ref 100–108)
CO2 SERPL-SCNC: 29 MMOL/L (ref 21–32)
CREAT SERPL-MCNC: 0.65 MG/DL (ref 0.6–1.3)
EOSINOPHIL # BLD AUTO: 0.03 THOUSAND/ΜL (ref 0–0.61)
EOSINOPHIL NFR BLD AUTO: 0 % (ref 0–6)
ERYTHROCYTE [DISTWIDTH] IN BLOOD BY AUTOMATED COUNT: 14 % (ref 11.6–15.1)
GFR SERPL CREATININE-BSD FRML MDRD: 128 ML/MIN/1.73SQ M
GLUCOSE SERPL-MCNC: 108 MG/DL (ref 65–140)
HCT VFR BLD AUTO: 28.8 % (ref 34.8–46.1)
HGB BLD-MCNC: 9.3 G/DL (ref 11.5–15.4)
IMM GRANULOCYTES # BLD AUTO: 0.1 THOUSAND/UL (ref 0–0.2)
IMM GRANULOCYTES NFR BLD AUTO: 1 % (ref 0–2)
LYMPHOCYTES # BLD AUTO: 2.4 THOUSANDS/ΜL (ref 0.6–4.47)
LYMPHOCYTES NFR BLD AUTO: 20 % (ref 14–44)
MCH RBC QN AUTO: 30.7 PG (ref 26.8–34.3)
MCHC RBC AUTO-ENTMCNC: 32.3 G/DL (ref 31.4–37.4)
MCV RBC AUTO: 95 FL (ref 82–98)
MONOCYTES # BLD AUTO: 1.34 THOUSAND/ΜL (ref 0.17–1.22)
MONOCYTES NFR BLD AUTO: 11 % (ref 4–12)
NEUTROPHILS # BLD AUTO: 8.04 THOUSANDS/ΜL (ref 1.85–7.62)
NEUTS SEG NFR BLD AUTO: 68 % (ref 43–75)
NRBC BLD AUTO-RTO: 0 /100 WBCS
PLATELET # BLD AUTO: 225 THOUSANDS/UL (ref 149–390)
PMV BLD AUTO: 9.9 FL (ref 8.9–12.7)
POTASSIUM SERPL-SCNC: 3.3 MMOL/L (ref 3.5–5.3)
PROT SERPL-MCNC: 5.7 G/DL (ref 6.4–8.2)
RBC # BLD AUTO: 3.03 MILLION/UL (ref 3.81–5.12)
SODIUM SERPL-SCNC: 148 MMOL/L (ref 136–145)
WBC # BLD AUTO: 11.92 THOUSAND/UL (ref 4.31–10.16)

## 2019-07-06 PROCEDURE — 99254 IP/OBS CNSLTJ NEW/EST MOD 60: CPT | Performed by: SURGERY

## 2019-07-06 PROCEDURE — 85025 COMPLETE CBC W/AUTO DIFF WBC: CPT | Performed by: INTERNAL MEDICINE

## 2019-07-06 PROCEDURE — 99233 SBSQ HOSP IP/OBS HIGH 50: CPT | Performed by: GENERAL PRACTICE

## 2019-07-06 PROCEDURE — 74177 CT ABD & PELVIS W/CONTRAST: CPT

## 2019-07-06 PROCEDURE — 80053 COMPREHEN METABOLIC PANEL: CPT | Performed by: INTERNAL MEDICINE

## 2019-07-06 PROCEDURE — C9113 INJ PANTOPRAZOLE SODIUM, VIA: HCPCS | Performed by: INTERNAL MEDICINE

## 2019-07-06 PROCEDURE — 76705 ECHO EXAM OF ABDOMEN: CPT

## 2019-07-06 RX ORDER — BISACODYL 10 MG
10 SUPPOSITORY, RECTAL RECTAL ONCE
Status: COMPLETED | OUTPATIENT
Start: 2019-07-06 | End: 2019-07-06

## 2019-07-06 RX ORDER — BISACODYL 10 MG
10 SUPPOSITORY, RECTAL RECTAL DAILY
Status: DISCONTINUED | OUTPATIENT
Start: 2019-07-06 | End: 2019-07-06

## 2019-07-06 RX ORDER — KETOROLAC TROMETHAMINE 30 MG/ML
15 INJECTION, SOLUTION INTRAMUSCULAR; INTRAVENOUS EVERY 6 HOURS PRN
Status: DISCONTINUED | OUTPATIENT
Start: 2019-07-06 | End: 2019-07-07

## 2019-07-06 RX ORDER — BISACODYL 10 MG
10 SUPPOSITORY, RECTAL RECTAL DAILY
Status: DISCONTINUED | OUTPATIENT
Start: 2019-07-07 | End: 2019-07-08 | Stop reason: HOSPADM

## 2019-07-06 RX ORDER — PROMETHAZINE HYDROCHLORIDE 25 MG/ML
12.5 INJECTION, SOLUTION INTRAMUSCULAR; INTRAVENOUS EVERY 12 HOURS PRN
Status: DISCONTINUED | OUTPATIENT
Start: 2019-07-06 | End: 2019-07-08 | Stop reason: HOSPADM

## 2019-07-06 RX ORDER — ONDANSETRON 2 MG/ML
4 INJECTION INTRAMUSCULAR; INTRAVENOUS ONCE AS NEEDED
Status: DISCONTINUED | OUTPATIENT
Start: 2019-07-06 | End: 2019-07-06

## 2019-07-06 RX ORDER — DIPHENHYDRAMINE HYDROCHLORIDE 50 MG/ML
25 INJECTION INTRAMUSCULAR; INTRAVENOUS
Status: DISCONTINUED | OUTPATIENT
Start: 2019-07-06 | End: 2019-07-08 | Stop reason: HOSPADM

## 2019-07-06 RX ORDER — HYDROMORPHONE HCL/PF 1 MG/ML
0.2 SYRINGE (ML) INJECTION ONCE
Status: COMPLETED | OUTPATIENT
Start: 2019-07-06 | End: 2019-07-06

## 2019-07-06 RX ORDER — BISACODYL 10 MG
10 SUPPOSITORY, RECTAL RECTAL 2 TIMES DAILY
Status: DISCONTINUED | OUTPATIENT
Start: 2019-07-06 | End: 2019-07-06

## 2019-07-06 RX ORDER — DEXTROSE, SODIUM CHLORIDE, AND POTASSIUM CHLORIDE 5; .45; .3 G/100ML; G/100ML; G/100ML
75 INJECTION INTRAVENOUS CONTINUOUS
Status: DISCONTINUED | OUTPATIENT
Start: 2019-07-06 | End: 2019-07-08

## 2019-07-06 RX ADMIN — PANTOPRAZOLE SODIUM 40 MG: 40 INJECTION, POWDER, FOR SOLUTION INTRAVENOUS at 21:23

## 2019-07-06 RX ADMIN — Medication 10 MG: at 11:14

## 2019-07-06 RX ADMIN — HEPARIN SODIUM 5000 UNITS: 5000 INJECTION INTRAVENOUS; SUBCUTANEOUS at 13:28

## 2019-07-06 RX ADMIN — BISACODYL 10 MG: 10 SUPPOSITORY RECTAL at 21:23

## 2019-07-06 RX ADMIN — Medication 1 SPRAY: at 08:07

## 2019-07-06 RX ADMIN — KETOROLAC TROMETHAMINE 15 MG: 30 INJECTION, SOLUTION INTRAMUSCULAR at 11:55

## 2019-07-06 RX ADMIN — HEPARIN SODIUM 5000 UNITS: 5000 INJECTION INTRAVENOUS; SUBCUTANEOUS at 05:06

## 2019-07-06 RX ADMIN — CHLORHEXIDINE GLUCONATE 0.12% ORAL RINSE 30 ML: 1.2 LIQUID ORAL at 08:06

## 2019-07-06 RX ADMIN — METOCLOPRAMIDE 5 MG: 5 INJECTION, SOLUTION INTRAMUSCULAR; INTRAVENOUS at 05:13

## 2019-07-06 RX ADMIN — HEPARIN SODIUM 5000 UNITS: 5000 INJECTION INTRAVENOUS; SUBCUTANEOUS at 21:24

## 2019-07-06 RX ADMIN — CHLORHEXIDINE GLUCONATE 0.12% ORAL RINSE 30 ML: 1.2 LIQUID ORAL at 21:24

## 2019-07-06 RX ADMIN — CHLORHEXIDINE GLUCONATE 0.12% ORAL RINSE 30 ML: 1.2 LIQUID ORAL at 11:14

## 2019-07-06 RX ADMIN — HYDROMORPHONE HYDROCHLORIDE 0.2 MG: 1 INJECTION, SOLUTION INTRAMUSCULAR; INTRAVENOUS; SUBCUTANEOUS at 05:42

## 2019-07-06 RX ADMIN — DIPHENHYDRAMINE HYDROCHLORIDE 25 MG: 50 INJECTION, SOLUTION INTRAMUSCULAR; INTRAVENOUS at 21:23

## 2019-07-06 RX ADMIN — CHLORHEXIDINE GLUCONATE 0.12% ORAL RINSE 30 ML: 1.2 LIQUID ORAL at 17:33

## 2019-07-06 RX ADMIN — PANTOPRAZOLE SODIUM 40 MG: 40 INJECTION, POWDER, FOR SOLUTION INTRAVENOUS at 08:07

## 2019-07-06 RX ADMIN — IOHEXOL 65 ML: 350 INJECTION, SOLUTION INTRAVENOUS at 14:15

## 2019-07-06 RX ADMIN — CHLORHEXIDINE GLUCONATE 0.12% ORAL RINSE 30 ML: 1.2 LIQUID ORAL at 13:28

## 2019-07-06 RX ADMIN — Medication 1 SPRAY: at 11:20

## 2019-07-06 NOTE — ASSESSMENT & PLAN NOTE
RUQ US suspicious for cholecystitis - I have discussed w/ surgery  NPO as pt vomits and has severe pain when she eats  Toradol and morphine prn pain - no pain at this time as she is NPO  Gen surg consulted

## 2019-07-06 NOTE — DISCHARGE SUMMARY
The patient is a 20year old female with no PHMx who presented for scheduled elective jaw and intranasal surgery  She has a history of developmental jaw deformity with malocclusion, deviated nasal septum and enlargement of inferior turbinates  The patient underwent maxillary LeFort I segmental osteotomy, bilateral sagittal split osteotomies of the mandible, oblique osteotomy of the chin, septoplasty and reduction of inferior turbinates on 06/28/2019  An upper splint was left in place to the maxilla  Upon completion of the above listed surgical procedures, the patient was extubated in the OR and transferred to the postanesthesia care unit  Her O2 saturation was in the low 90s and did not improve in spite of non rebreather mask and then high flow nasal cannula  She was admitted to SICU  There were no bleeding or obstruction issues from the maxillofacial surgery  The chest Dx revealed B/L chest infiltrates and she developed lactic acidosis  The patient was taken back to the OT and orally ET intubated without issues  I was present during the procedure  She returned to SICU and was started management for ARDS  She received 1u PRBC  On POD#1-3 patient remained intubated, on vents, sedation, NG tube feeds  Her status gradually improved  Her acidosis resolved and the chest Dx cleared up  On POD#4, she was extubated without issues  Same day, I replaced her interocclusal rubber bands and the jaws fit well into the occlusal maxillary splint  She developed transient tachycardia likely from sedation withdrawal  She was treated with weaning doses of sedation and tachycardia resolved  On POD#5 the patient started tolerating small amount of PO    On POD#6 she was able to ambulate with assistance and was able to perform oral care by herself  She was able to drink full liquid diet with the aid of a Marcel syringe, yet she had persistent nausea and vomiting, requiring IV fluids  The KUB study revealed no obstruction   She was transferred from ICU to regular floor  On POD#7, patient had bowel movement, but her nausea and vomiting worsened  She could drink w/o difficulty but could not hold fluids down  She stared experiencing abdominal pain in the RUQ  Chemistry revealed AST/ALT elevation  Medicine and Surgical services were consulted  Abdomen CT and US revealed non specific changes, no stones, cholecystitis could not be ruled out  Pt was kept NPO during the day and she started tolerating sips of clear liquid in the evening  On POD#8 pt felt better, tolerating PO clear liquids  I replaced the maxillo-mandibular rubber bands with new ones  Her occlusion remains good, into splint  On POD#9 pt continued to improve, tolerating clear liquids  On POD#10 she had HIDA scan, which r/o biliary obstruction  She remained asymptomatic during the day, tolerating PO full liquid diet without issues  Patient was instructed in take home medications and discharged home with all criteria met

## 2019-07-06 NOTE — PROGRESS NOTES
[facility] Progress Note  POD#8     21 y/o F s/p elective bimaxillary surgery  She is ambulating, no ongoing issues with the post surgical healing of the jaws  Remains in inter-occlusal rubberbands  I removed the facial sutures to B/L cheeks  She has been experiencing ongoing nausea and vomiting over the past 3 days  Was kept NPO overnight and IVF  Had a bowel movement last night  Today, she tried again PO intake and had worsening of abdominal pain  AST/ALT are again elevated  Medicine consult is appreciated  She will have abdominal CT to investigate source of pain  P/E  GUS: resolving swelling to midface  Nose: dried blood to nostrils, patent airway  Mouth: good intercuspidation in the occlusal splint  Sutures to upper/lower vestibule intact and hemostatic  No wound dehiscence  Neck: improving swelling to lower face, as expected  R IJ in place  CVS: RRR, S1/S2  CHEST: CTA B/L  ABD: +BS,+ tenderness to URQ, worse on inspiration           A:   s/p bimaxillary jaw surgery  Nausea and vomiting with RUQ pain, elevated AST/ALT  resolving hypokalemia likely 2/2 vomiting     Plan:     OOB, encourage hourly ambulation  Nasal saline spray 4x/day  Tooth brushing and chlorhexidine rinse 5x/day  Vaseline to lips     Abdominal pain, nausea/vomiting  Medicine will investigate possible cholecystitis  Ketorolac PRN  Morphine PRN  Bisacodyl suppository  Reglan/ Pherenergan PRN     Nutrition:  PO feeding goal: 60cc/ hour with Marcel syringe - will hold for now  IV fluids to cover deficit

## 2019-07-06 NOTE — ASSESSMENT & PLAN NOTE
Hypokalemia due to vomiting, this might be related   Potassium was burning her, so we started D5 1/2NS with potassium   Morning labs  Repeat mag, phos

## 2019-07-06 NOTE — PROGRESS NOTES
[facility] Progress Note  POD#8    Brenda Thompson has been NPO since am  No N/V episodes since this am  Abdominal pain was controlled with ketorolac  Abdomen CT and RUQ US did not come conclusive concerning possible cholecystitis  Surgery was consulted and a HIDA scan is planned for tomorrow  I examined Brenda Thompson this evening, she looks overall improved  Has been ambulating in room and now resting comfortable in chair  Her mother visited her this afternoon  I reviewed with her the plan of care  The healing of her maxilofacial complex undergoes normal course  Has +BS and mild epigastric/RUQ pain to palpation, no abdominal dystension  Plan:  Continue IV fluids  IV protonix  Have sips of clear liquids, no ensure for the moment    Pain management with Ketorolac, no narcotics or tylenol for now  Benadryl at night if insomnia  Dulcolax bid  Encourage OOB and ambilation  Encourage shower in am  F/U lab trends  F/U with Surgery and Medicine recommendations

## 2019-07-06 NOTE — PROGRESS NOTES
RN notified SLIM MD that RN received call from radiology stating there were immediate findings on the US of the gallbladder and CT c/a/p  No new orders at this time

## 2019-07-06 NOTE — PROGRESS NOTES
Progress Note - Nahomi Fischer 2000, 20 y o  female MRN: [id number]    Unit/Bed#: -01 Encounter: [id number]    Primary Care Provider: Kelly Allen MD   Date and time admitted to hospital: 6/28/2019  6:20 AM        Cholecystitis, acute  Assessment & Plan  RUQ US suspicious for cholecystitis - I have discussed w/ surgery  NPO as pt vomits and has severe pain when she eats  Toradol and morphine prn pain - no pain at this time as she is NPO  Gen surg consulted      Elevated liver enzymes  Assessment & Plan  Suspect cholecysitits    Hypokalemia  Assessment & Plan  Hypokalemia due to vomiting, this might be related   Potassium was burning her, so we started D5 1/2NS with potassium   Morning labs  Repeat mag, phos    Post-operative nausea and vomiting  Assessment & Plan  I am very suspicious of acute cholecystitis  Monitor electrolytes, K, Mg, Phos and replete  Unsure why Zofran did not work, Elex Diver works, should be limited use due to side effects  phenergan at night for sleeping is okay  Keep NPO for now  Passing flatulence  Did not have bowel movement, receive suppository  Monitor closely with supportive care  * Skeletal deformity of jaw  Assessment & Plan  S/p surgery  Per primary team    VTE Pharmacologic Prophylaxis:   Pharmacologic: Heparin  Mechanical VTE Prophylaxis in Place: Yes    Patient Centered Rounds: I have performed bedside rounds with nursing staff today  Discussions with Specialists or Other Care Team Provider: OMFS and gen surg    Education and Discussions with Family / Patient: pt    Time Spent for Care: 30 minutes  More than 50% of total time spent on counseling and coordination of care as described above      Current Length of Stay: 8 day(s)    Current Patient Status: Inpatient   Certification Statement: The patient will continue to require additional inpatient hospital stay due to NPO    Discharge Plan: pending surgical eval and taking care of her cholecystitis    Code Status: Level 1 - Full Code      Subjective:   Pain and nausea w/ eating  No complaints while NPO    Objective:     Vitals:   Temp (24hrs), Av 7 °F (37 1 °C), Min:98 3 °F (36 8 °C), Max:99 1 °F (37 3 °C)    Temp:  [98 3 °F (36 8 °C)-99 1 °F (37 3 °C)] 98 3 °F (36 8 °C)  HR:  [66-71] 68  Resp:  [16-18] 18  BP: (114-126)/(62-77) 114/62  SpO2:  [98 %-100 %] 100 %  Body mass index is 21 99 kg/m²  Input and Output Summary (last 24 hours): Intake/Output Summary (Last 24 hours) at 2019 1658  Last data filed at 2019 0556  Gross per 24 hour   Intake 785 83 ml   Output    Net 785 83 ml       Physical Exam:     Physical Exam   Constitutional: She is oriented to person, place, and time  No distress  HENT:   Head: Normocephalic and atraumatic  Eyes: Conjunctivae and EOM are normal    Neck: Normal range of motion  Neck supple  Cardiovascular: Normal rate and regular rhythm  Pulmonary/Chest: Effort normal and breath sounds normal  She has no wheezes  She has no rales  Abdominal: There is tenderness (RUQ  Pos Bowles's sign)  Musculoskeletal: Normal range of motion  She exhibits no edema  Neurological: She is alert and oriented to person, place, and time  Skin: Skin is warm and dry  She is not diaphoretic  Additional Data:     Labs:    Results from last 7 days   Lab Units 19  0504   WBC Thousand/uL 11 92*   HEMOGLOBIN g/dL 9 3*   HEMATOCRIT % 28 8*   PLATELETS Thousands/uL 225   NEUTROS PCT % 68   LYMPHS PCT % 20   MONOS PCT % 11   EOS PCT % 0     Results from last 7 days   Lab Units 19  0504   POTASSIUM mmol/L 3 3*   CHLORIDE mmol/L 113*   CO2 mmol/L 29   BUN mg/dL 20   CREATININE mg/dL 0 65   CALCIUM mg/dL 8 5   ALK PHOS U/L 46   ALT U/L 137*   AST U/L 78*           * I Have Reviewed All Lab Data Listed Above  * Additional Pertinent Lab Tests Reviewed:  [facility]        Recent Cultures (last 7 days):     Results from last 7 days   Lab Units 07/01/19  0853   SPUTUM CULTURE  No growth   GRAM STAIN RESULT  No Epithelial cells seen  4+ Polys  2+ Mononuclear Cells  No bacteria seen       Last 24 Hours Medication List:     Current Facility-Administered Medications:  acetaminophen 650 mg Oral Q8H Vishal Mariee MD    bisacodyl 10 mg Rectal Daily Yovani Robb DMD    chlorhexidine 30 mL Swish & Spit 5x Daily Vishal Mariee MD    dextrose 5 % and sodium chloride 0 45 % with KCl 40 mEq/L 75 mL/hr Intravenous Continuous Denzel Fowler DO Last Rate: Stopped (07/06/19 1312)   heparin (porcine) 5,000 Units Subcutaneous [facility] Vishal Mariee MD    ketorolac 15 mg Intravenous Q6H PRN Yovani Robb, NABOR    LORazepam 0 5 mg Intravenous BID PRN Vishal Mariee MD    metoclopramide 5 mg Intravenous Q6H PRN Karishma Wagoner MD    morphine injection 2 mg Intravenous Q2H PRN Yovani Robb DMD    ondansetron 4 mg Intravenous Once PRN Felicia Shah PA-C    oxyCODONE 5 mg Oral Q4H PRN Vishal Mariee MD    pantoprazole 40 mg Intravenous Q12H [address] Karishma Wagoner MD    promethazine 12 5 mg Intravenous Q12H PRN Denzel Fowler DO    sodium chloride 1 spray Each Nare 4x Daily Vishal Mariee MD         Today, Patient Was Seen By: Denzel Fowler DO    ** Please Note: Dictation voice to text software may have been used in the creation of this document   **

## 2019-07-06 NOTE — CONSULTS
Consultation - General Surgery   Demarcus Kevin 20 y o  female MRN: [id number]  Unit/Bed#: -01 Encounter: [id number]    Assessment/Plan   Assessment:  20 y o  female with history of developmental deformity of the jaws now status post elective reconstruction on 06/28/2019, who now has developed abdominal pain and transaminitis which can be seen with 7 days of emesis/retching, however cholecystitis has not been ruled out  Plan:  Given imaging findings thus far been equivocal for acute cholecystitis, will order a HIDA scan to confirm  Will continue to monitor abdominal exam  Patient has only thrown up once today which she states is improving will continue to monitor frequency of emesis  Given history of brown emesis and black stool would continue IV Protonix and consider liquid Carafate  Continue NPO with meds for now until imaging can result    Staffed with Dr Anabel Hodgson      History of Present Illness   HPI:  Demarcus Kevin is a 20 y o  female with history of developmental deformity of the jaws now status post elective reconstruction on 06/28/2019, who now has developed abdominal pain and transaminitis  Since the patient's surgery she has been nauseous and vomiting multiple times a day  The patient does not feel that the vomiting is in relation to her meals of Ensure  The patient describes the emesis as brown  The patient has had 1 bowel movement since her surgery and described as black  When asked about the location of her abdominal pain the patient motions to her epigastrium region  She has had this abdominal pain for about 2 days  The patient has been afebrile but she endorses subjective chills  CT abdomen pelvis was performed revealing mild gallbladder wall thickening, equivocal for acute cholecystitis, normal appendix    Right upper quadrant ultrasound was performed described the gallbladder is contracted with thickened walls and sludge, equivocal   Surgery was consulted to rule out acute cholecystitis  Consults    Review of Systems  10/14 systems reviewed and negative except those mentioned in the HPI  Historical Information   History reviewed  No pertinent past medical history    Past Surgical History:   Procedure Laterality Date    MAXILLARY LE FORTE OSTEOTOMY Bilateral 6/28/2019    Procedure: GENIOPLASTY, LEFORTE I, BILATERAL SAGITAL SPLIT RAMUS OSTEOTOMY, SEPTOPLASTY, INFERIOR TURBINATE REDUCTION;  Surgeon: Trey Justice DMD;  Location: [facility];  Service: Maxillofacial    CT BRONCHOSCOPY,DIAGNOSTIC N/A 6/28/2019    Procedure: BRONCHOSCOPY FLEXIBLE, CONTROLLED INTUBATION;  Surgeon: Hans Young DO;  Location: [facility];  Service: General    WISDOM TOOTH EXTRACTION       Social History   Social History     Substance and Sexual Activity   Alcohol Use Never    Frequency: Never     Social History     Substance and Sexual Activity   Drug Use Never     Social History     Tobacco Use   Smoking Status Never Smoker   Smokeless Tobacco Never Used     Family History: non-contributory    Meds/Allergies   current meds:   Current Facility-Administered Medications   Medication Dose Route Frequency    acetaminophen (TYLENOL) oral suspension 650 mg  650 mg Oral Q8H    bisacodyl (DULCOLAX) rectal suppository 10 mg  10 mg Rectal Daily    chlorhexidine (PERIDEX) 0 12 % oral rinse 30 mL  30 mL Swish & Spit 5x Daily    dextrose 5 % and sodium chloride 0 45 % with KCl 40 mEq/L infusion (premix)  75 mL/hr Intravenous Continuous    heparin (porcine) subcutaneous injection 5,000 Units  5,000 Units Subcutaneous Q8H [address]    ketorolac (TORADOL) injection 15 mg  15 mg Intravenous Q6H PRN    LORazepam (ATIVAN) 2 mg/mL injection 0 5 mg  0 5 mg Intravenous BID PRN    metoclopramide (REGLAN) injection 5 mg  5 mg Intravenous Q6H PRN    morphine injection 2 mg  2 mg Intravenous Q2H PRN    ondansetron (ZOFRAN) injection 4 mg  4 mg Intravenous Once PRN    oxyCODONE (ROXICODONE) oral solution 5 mg  5 mg Oral Q4H PRN    pantoprazole (PROTONIX) injection 40 mg  40 mg Intravenous Q12H [facility]    promethazine (PHENERGAN) injection 12 5 mg  12 5 mg Intravenous Q12H PRN    sodium chloride (OCEAN) 0 65 % nasal spray 1 spray  1 spray Each Nare 4x Daily    and PTA meds:   None     No Known Allergies    Objective   First Vitals:   Blood Pressure: 109/74 (06/28/19 0656)  Pulse: (!) 110 (06/28/19 0656)  Temperature: (!) 96 5 °F (35 8 °C) (06/28/19 0656)  Temp Source: Oral (06/28/19 0656)  Respirations: 20 (06/28/19 0656)  Height: 5' 4" (162 6 cm) (06/28/19 0656)  Weight - Scale: 54 kg (119 lb) (06/28/19 0656)  SpO2: 96 % (06/28/19 0656)    Current Vitals:   Blood Pressure: 114/62 (07/06/19 1500)  Pulse: 68 (07/06/19 1500)  Temperature: 98 3 °F (36 8 °C) (07/06/19 1500)  Temp Source: Axillary (07/06/19 1500)  Respirations: 18 (07/06/19 1500)  Height: 5' 4" (162 6 cm) (06/30/19 1328)  Weight - Scale: 58 1 kg (128 lb 1 4 oz) (07/03/19 0500)  SpO2: 100 % (07/06/19 1500)      Intake/Output Summary (Last 24 hours) at 7/6/2019 1751  Last data filed at 7/6/2019 0556  Gross per 24 hour   Intake 785 83 ml   Output    Net 785 83 ml       Invasive Devices     Peripheral Intravenous Line            Peripheral IV 07/05/19 Right Arm less than 1 day                Physical Exam   Constitutional: She is oriented to person, place, and time  She appears well-developed and well-nourished  No distress  HENT:   Head: Normocephalic and atraumatic  Patient is mouth is wired shut  Eyes: Pupils are equal, round, and reactive to light  Conjunctivae and EOM are normal  No scleral icterus  Neck: Normal range of motion  Neck supple  No JVD present  No tracheal deviation present  Cardiovascular: Normal rate, regular rhythm and intact distal pulses  Pulmonary/Chest: Effort normal  No respiratory distress  She exhibits no tenderness  Abdominal: Soft  She exhibits no distension  There is tenderness  There is no rebound and no guarding  Patient is tender across epigastrium and right abdomen  Musculoskeletal: Normal range of motion  She exhibits no edema, tenderness or deformity  Neurological: She is alert and oriented to person, place, and time  No cranial nerve deficit  Skin: Skin is warm and dry  She is not diaphoretic  Psychiatric: She has a normal mood and affect  Her behavior is normal  Judgment and thought content normal    Nursing note and vitals reviewed  Lab Results:   CBC:   Lab Results   Component Value Date    WBC 11 92 (H) 07/06/2019    HGB 9 3 (L) 07/06/2019    HCT 28 8 (L) 07/06/2019    MCV 95 07/06/2019     07/06/2019    MCH 30 7 07/06/2019    MCHC 32 3 07/06/2019    RDW 14 0 07/06/2019    MPV 9 9 07/06/2019    NRBC 0 07/06/2019   , CMP:   Lab Results   Component Value Date    SODIUM 148 (H) 07/06/2019    K 3 3 (L) 07/06/2019     (H) 07/06/2019    CO2 29 07/06/2019    BUN 20 07/06/2019    CREATININE 0 65 07/06/2019    CALCIUM 8 5 07/06/2019    AST 78 (H) 07/06/2019     (H) 07/06/2019    ALKPHOS 46 07/06/2019    EGFR 128 07/06/2019   , Coagulation: No results found for: PT, INR, APTT, Urinalysis: No results found for: COLORU, CLARITYU, SPECGRAV, PHUR, LEUKOCYTESUR, NITRITE, PROTEINUA, GLUCOSEU, KETONESU, BILIRUBINUR, BLOODU, Amylase: No results found for: AMYLASE, Lipase: No results found for: LIPASE    Imaging: I have personally reviewed pertinent reports  Procedure: Xr Abdomen 1 View Kub  Result Date: 7/6/2019  Impression: No radiographic evidence of bowel obstruction or intraperitoneal free air  Other findings as above  Procedure: Xr Abdomen 1 View Kub  Result Date: 7/4/2019   Impression: No evidence of intestinal obstruction       Procedure: Us Gallbladder  Result Date: 7/6/2019  Impresion: Gallbladder is contracted measuring approximately 5 2 cm in length by 1 7 cm diameter There appears to be some high attenuation material layering dependently without acoustic shadowing, probably thickened bile/"sludge"  Gallbladder wall is thickened at 5 mm, this is nonspecific in the setting of both incompletely distended gallbladder and ascites  Reportedly positive sonographic Bowles sign  These findings can be seen in the setting of cholecystitis however the diagnosis is not confirmed by this ultrasound  Surgical consultation advised  HIDA scan may be of value  Procedure: Ct Abdomen Pelvis W Contrast  Result Date: 7/6/2019  Impression: 1  Mild gallbladder wall thickening and mucosal hyperemia  However, the gallbladder is nondistended  Findings are equivocal for cholecystitis  Consider ultrasound or hepatobiliary scintigraphy  2   Mild intrahepatic biliary dilation  3   Small volume abdominal pelvic ascites  EKG, Pathology, and Other Studies: I have personally reviewed pertinent reports

## 2019-07-06 NOTE — ASSESSMENT & PLAN NOTE
I am very suspicious of acute cholecystitis  Monitor electrolytes, K, Mg, Phos and replete  Unsure why Zofran did not work, Lucy Wen works, should be limited use due to side effects  phenergan at night for sleeping is okay  Keep NPO for now  Passing flatulence  Did not have bowel movement, receive suppository  Monitor closely with supportive care

## 2019-07-07 PROBLEM — D64.9 ANEMIA: Status: ACTIVE | Noted: 2019-06-29

## 2019-07-07 LAB
ALBUMIN SERPL BCP-MCNC: 2.8 G/DL (ref 3.5–5)
ALP SERPL-CCNC: 46 U/L (ref 46–116)
ALT SERPL W P-5'-P-CCNC: 112 U/L (ref 12–78)
ANION GAP SERPL CALCULATED.3IONS-SCNC: 6 MMOL/L (ref 4–13)
AST SERPL W P-5'-P-CCNC: 32 U/L (ref 5–45)
BILIRUB SERPL-MCNC: 0.62 MG/DL (ref 0.2–1)
BUN SERPL-MCNC: 19 MG/DL (ref 5–25)
CALCIUM SERPL-MCNC: 8.2 MG/DL (ref 8.3–10.1)
CHLORIDE SERPL-SCNC: 112 MMOL/L (ref 100–108)
CO2 SERPL-SCNC: 27 MMOL/L (ref 21–32)
CREAT SERPL-MCNC: 0.73 MG/DL (ref 0.6–1.3)
ERYTHROCYTE [DISTWIDTH] IN BLOOD BY AUTOMATED COUNT: 13.9 % (ref 11.6–15.1)
GFR SERPL CREATININE-BSD FRML MDRD: 119 ML/MIN/1.73SQ M
GLUCOSE SERPL-MCNC: 87 MG/DL (ref 65–140)
HCT VFR BLD AUTO: 27.7 % (ref 34.8–46.1)
HGB BLD-MCNC: 8.6 G/DL (ref 11.5–15.4)
MAGNESIUM SERPL-MCNC: 2.2 MG/DL (ref 1.6–2.6)
MCH RBC QN AUTO: 30.3 PG (ref 26.8–34.3)
MCHC RBC AUTO-ENTMCNC: 31 G/DL (ref 31.4–37.4)
MCV RBC AUTO: 98 FL (ref 82–98)
PHOSPHATE SERPL-MCNC: 4 MG/DL (ref 2.7–4.5)
PLATELET # BLD AUTO: 238 THOUSANDS/UL (ref 149–390)
PMV BLD AUTO: 9.8 FL (ref 8.9–12.7)
POTASSIUM SERPL-SCNC: 3.5 MMOL/L (ref 3.5–5.3)
PROT SERPL-MCNC: 5.4 G/DL (ref 6.4–8.2)
RBC # BLD AUTO: 2.84 MILLION/UL (ref 3.81–5.12)
SODIUM SERPL-SCNC: 145 MMOL/L (ref 136–145)
WBC # BLD AUTO: 10.98 THOUSAND/UL (ref 4.31–10.16)

## 2019-07-07 PROCEDURE — 85027 COMPLETE CBC AUTOMATED: CPT | Performed by: GENERAL PRACTICE

## 2019-07-07 PROCEDURE — 80053 COMPREHEN METABOLIC PANEL: CPT | Performed by: GENERAL PRACTICE

## 2019-07-07 PROCEDURE — C9113 INJ PANTOPRAZOLE SODIUM, VIA: HCPCS | Performed by: INTERNAL MEDICINE

## 2019-07-07 PROCEDURE — 99232 SBSQ HOSP IP/OBS MODERATE 35: CPT | Performed by: GENERAL PRACTICE

## 2019-07-07 PROCEDURE — 83735 ASSAY OF MAGNESIUM: CPT | Performed by: GENERAL PRACTICE

## 2019-07-07 PROCEDURE — 84100 ASSAY OF PHOSPHORUS: CPT | Performed by: GENERAL PRACTICE

## 2019-07-07 PROCEDURE — 99232 SBSQ HOSP IP/OBS MODERATE 35: CPT | Performed by: SURGERY

## 2019-07-07 RX ORDER — ACETAMINOPHEN 325 MG/1
650 TABLET ORAL EVERY 4 HOURS PRN
Status: DISCONTINUED | OUTPATIENT
Start: 2019-07-07 | End: 2019-07-08 | Stop reason: HOSPADM

## 2019-07-07 RX ORDER — KETOROLAC TROMETHAMINE 30 MG/ML
15 INJECTION, SOLUTION INTRAMUSCULAR; INTRAVENOUS EVERY 6 HOURS PRN
Status: DISCONTINUED | OUTPATIENT
Start: 2019-07-07 | End: 2019-07-08 | Stop reason: HOSPADM

## 2019-07-07 RX ORDER — LORAZEPAM 2 MG/ML
0.5 CONCENTRATE ORAL 2 TIMES DAILY PRN
Qty: 5 ML | Refills: 0 | Status: SHIPPED | OUTPATIENT
Start: 2019-07-07 | End: 2019-07-08 | Stop reason: SDUPTHER

## 2019-07-07 RX ADMIN — CHLORHEXIDINE GLUCONATE 0.12% ORAL RINSE 30 ML: 1.2 LIQUID ORAL at 10:21

## 2019-07-07 RX ADMIN — HEPARIN SODIUM 5000 UNITS: 5000 INJECTION INTRAVENOUS; SUBCUTANEOUS at 16:05

## 2019-07-07 RX ADMIN — CHLORHEXIDINE GLUCONATE 0.12% ORAL RINSE 30 ML: 1.2 LIQUID ORAL at 21:44

## 2019-07-07 RX ADMIN — CHLORHEXIDINE GLUCONATE 0.12% ORAL RINSE 30 ML: 1.2 LIQUID ORAL at 16:05

## 2019-07-07 RX ADMIN — POTASSIUM CHLORIDE, DEXTROSE MONOHYDRATE AND SODIUM CHLORIDE 75 ML/HR: 300; 5; 450 INJECTION, SOLUTION INTRAVENOUS at 21:47

## 2019-07-07 RX ADMIN — Medication 1 SPRAY: at 17:23

## 2019-07-07 RX ADMIN — PANTOPRAZOLE SODIUM 40 MG: 40 INJECTION, POWDER, FOR SOLUTION INTRAVENOUS at 08:35

## 2019-07-07 RX ADMIN — HEPARIN SODIUM 5000 UNITS: 5000 INJECTION INTRAVENOUS; SUBCUTANEOUS at 05:26

## 2019-07-07 RX ADMIN — POTASSIUM CHLORIDE, DEXTROSE MONOHYDRATE AND SODIUM CHLORIDE 75 ML/HR: 300; 5; 450 INJECTION, SOLUTION INTRAVENOUS at 05:27

## 2019-07-07 RX ADMIN — HEPARIN SODIUM 5000 UNITS: 5000 INJECTION INTRAVENOUS; SUBCUTANEOUS at 21:44

## 2019-07-07 RX ADMIN — Medication 1 SPRAY: at 08:36

## 2019-07-07 RX ADMIN — Medication 1 SPRAY: at 21:46

## 2019-07-07 RX ADMIN — CHLORHEXIDINE GLUCONATE 0.12% ORAL RINSE 30 ML: 1.2 LIQUID ORAL at 17:23

## 2019-07-07 RX ADMIN — CHLORHEXIDINE GLUCONATE 0.12% ORAL RINSE 30 ML: 1.2 LIQUID ORAL at 06:04

## 2019-07-07 RX ADMIN — Medication 1 SPRAY: at 10:22

## 2019-07-07 NOTE — PLAN OF CARE
Problem: Prexisting or High Potential for Compromised Skin Integrity  Goal: Skin integrity is maintained or improved  Description  INTERVENTIONS:  - Identify patients at risk for skin breakdown  - Assess and monitor skin integrity  - Assess and monitor nutrition and hydration status  - Monitor labs (i e  albumin)  - Assess for incontinence   - Turn and reposition patient  - Assist with mobility/ambulation  - Relieve pressure over bony prominences  - Avoid friction and shearing  - Provide appropriate hygiene as needed including keeping skin clean and dry  - Evaluate need for skin moisturizer/barrier cream  - Collaborate with interdisciplinary team (i e  Nutrition, Rehabilitation, etc )   - Patient/family teaching  7/6/2019 2303 by Stephen Gerard  Outcome: Progressing  7/6/2019 2302 by Stephen Gerard  Outcome: Progressing     Problem: Potential for Falls  Goal: Patient will remain free of falls  Description  INTERVENTIONS:  - Assess patient frequently for physical needs  -  Identify cognitive and physical deficits and behaviors that affect risk of falls    -  [place] fall precautions as indicated by assessment   - Educate patient/family on patient safety including physical limitations  - Instruct patient to call for assistance with activity based on assessment  - Modify environment to reduce risk of injury  - Consider OT/PT consult to assist with strengthening/mobility  7/6/2019 2303 by Stephen Gerard  Outcome: Progressing  7/6/2019 2302 by Stephen Gerard  Outcome: Progressing     Problem: PAIN - ADULT  Goal: Verbalizes/displays adequate comfort level or baseline comfort level  Description  Interventions:  - Encourage patient to monitor pain and request assistance  - Assess pain using appropriate pain scale  - Administer analgesics based on type and severity of pain and evaluate response  - Implement non-pharmacological measures as appropriate and evaluate response  - Consider cultural and social influences on pain and pain management  - Notify physician/advanced practitioner if interventions unsuccessful or patient reports new pain  7/6/2019 2303 by Carla Frederick  Outcome: Progressing  7/6/2019 2302 by Carla Frederick  Outcome: Progressing     Problem: INFECTION - ADULT  Goal: Absence or prevention of progression during hospitalization  Description  INTERVENTIONS:  - Assess and monitor for signs and symptoms of infection  - Monitor lab/diagnostic results  - Monitor all insertion sites, i e  indwelling lines, tubes, and drains  - Monitor endotracheal (as able) and nasal secretions for changes in amount and color  - [place] appropriate cooling/warming therapies per order  - Administer medications as ordered  - Instruct and encourage patient and family to use good hand hygiene technique  - Identify and instruct in appropriate isolation precautions for identified infection/condition  7/6/2019 2303 by Carla Frederick  Outcome: Progressing  7/6/2019 2302 by Carla Frederick  Outcome: Progressing  Goal: Absence of fever/infection during neutropenic period  Description  INTERVENTIONS:  - Monitor WBC  - Implement neutropenic guidelines  7/6/2019 2303 by Carla Frederick  Outcome: Progressing  7/6/2019 2302 by Carla Frederick  Outcome: Progressing     Problem: SAFETY ADULT  Goal: Patient will remain free of falls  Description  INTERVENTIONS:  - Assess patient frequently for physical needs  -  Identify cognitive and physical deficits and behaviors that affect risk of falls    -  [place] fall precautions as indicated by assessment   - Educate patient/family on patient safety including physical limitations  - Instruct patient to call for assistance with activity based on assessment  - Modify environment to reduce risk of injury  - Consider OT/PT consult to assist with strengthening/mobility  7/6/2019 2303 by Carla Frederick  Outcome: Progressing  7/6/2019 2302 by Carla Frederick  Outcome: Progressing  Goal: Maintain or return to baseline ADL function  Description  INTERVENTIONS:  -  Assess patient's ability to carry out ADLs; assess patient's baseline for ADL function and identify physical deficits which impact ability to perform ADLs (bathing, care of mouth/teeth, toileting, grooming, dressing, etc )  - Assess/evaluate cause of self-care deficits   - Assess range of motion  - Assess patient's mobility; develop plan if impaired  - Assess patient's need for assistive devices and provide as appropriate  - Encourage maximum independence but intervene and supervise when necessary  ¯ Involve family in performance of ADLs  ¯ Assess for home care needs following discharge   ¯ Request OT consult to assist with ADL evaluation and planning for discharge  ¯ Provide patient education as appropriate  7/6/2019 2303 by Berto Joseph  Outcome: Progressing  7/6/2019 2302 by Berto Joseph  Outcome: Progressing  Goal: Maintain or return mobility status to optimal level  Description  INTERVENTIONS:  - Assess patient's baseline mobility status (ambulation, transfers, stairs, etc )    - Identify cognitive and physical deficits and behaviors that affect mobility  - Identify mobility aids required to assist with transfers and/or ambulation (gait belt, sit-to-stand, lift, walker, cane, etc )  - [place] fall precautions as indicated by assessment  - Record patient progress and toleration of activity level on Mobility SBAR; progress patient to next Phase/Stage  - Instruct patient to call for assistance with activity based on assessment  - Request Rehabilitation consult to assist with strengthening/weightbearing, etc   7/6/2019 2303 by Berto Joseph  Outcome: Progressing  7/6/2019 2302 by Berto Joseph  Outcome: Progressing     Problem: DISCHARGE PLANNING  Goal: Discharge to home or other facility with appropriate resources  Description  INTERVENTIONS:  - Identify barriers to discharge w/patient and caregiver  - Arrange for needed discharge resources and transportation as appropriate  - Identify discharge learning needs (meds, wound care, etc )  - Arrange for interpretive services to assist at discharge as needed  - Refer to Case Management Department for coordinating discharge planning if the patient needs post-hospital services based on physician/advanced practitioner order or complex needs related to functional status, cognitive ability, or social support system  7/6/2019 2303 by Irasema Myers  Outcome: Progressing  7/6/2019 2302 by Irasema Myers  Outcome: Progressing     Problem: Knowledge Deficit  Goal: Patient/family/caregiver demonstrates understanding of disease process, treatment plan, medications, and discharge instructions  Description  Complete learning assessment and assess knowledge base  Interventions:  - Provide teaching at level of understanding  - Provide teaching via preferred learning methods  7/6/2019 2303 by Irasema Myers  Outcome: Progressing  7/6/2019 2302 by Irasema Myers  Outcome: Progressing     Problem: COPING  Goal: Will report anxiety at manageable levels  Description  INTERVENTIONS:  - Administer medication as ordered  - Teach and encourage coping skills  - Provide emotional support  - Assess patient/family for anxiety and ability to cope  7/6/2019 2303 by Irasema Myers  Outcome: Progressing  7/6/2019 2302 by Irasema Myers  Outcome: Progressing     Problem: Nutrition/Hydration-ADULT  Goal: Nutrient/Hydration intake appropriate for improving, restoring or maintaining nutritional needs  Description  Monitor and assess patient's nutrition/hydration status for malnutrition (ex- brittle hair, bruises, dry skin, pale skin and conjunctiva, muscle wasting, smooth red tongue, and disorientation)  Collaborate with interdisciplinary team and initiate plan and interventions as ordered  Monitor patient's weight and dietary intake as ordered or per policy  Utilize nutrition screening tool and intervene per policy  Determine patient's food preferences and provide high-protein, high-caloric foods as appropriate       INTERVENTIONS:  - Monitor oral intake, urinary output, labs, and treatment plans  - Assess nutrition and hydration status and recommend course of action  - Evaluate amount of meals eaten  - Assist patient with eating if necessary   - Allow adequate time for meals  - Recommend/ encourage appropriate diets, oral nutritional supplements, and vitamin/mineral supplements  - Order, calculate, and assess calorie counts as needed  - Recommend, monitor, and adjust tube feedings and TPN/PPN based on assessed needs  - Assess need for intravenous fluids  - Provide specific nutrition/hydration education as appropriate  - Include patient/family/caregiver in decisions related to nutrition  7/6/2019 2303 by Pooja Moran  Outcome: Progressing  7/6/2019 2302 by Pooja Moran  Outcome: Progressing

## 2019-07-07 NOTE — ASSESSMENT & PLAN NOTE
RUQ US suspicious for cholecystitis - I have discussed w/ surgery  Pt now tolerating CLD and now RUQ pain resolved  Gen surg appreciated  HIDA scan ordered - if positive, pt would prefer to delay surgery and tx conservatively with antibiotics

## 2019-07-07 NOTE — PROGRESS NOTES
Progress Note - General Surgery   Levell Bulmaro 20 y o  female MRN: [id number]  Unit/Bed#: -01 Encounter: [id number]    Assessment:  20 y o  female status post elective facial reconstruction on 06/28/2019, who now has developed abdominal pain and transaminitis which can be seen with 5 days of emesis/retching, however cholecystitis has not been ruled out  Plan:  HIDA ordered, will follow up result  Monitoring abdominal exam for changes  Patient tolerated clears yesterday without emesis  PPI for possible stress induced gastritis  Agree with stool softeners       Subjective/Objective     Subjective: NAEs  Patient states her abdominal pain feels somewhat improved from a 6-10 yesterday to a 3/10 today  Had no emesis overnight  Denies nausea this morning  Objective:    Blood pressure 117/78, pulse 88, temperature 98 2 °F (36 8 °C), temperature source Axillary, resp  rate 18, height 5' 4" (1 626 m), weight 58 1 kg (128 lb 1 4 oz), SpO2 98 %  ,Body mass index is 21 99 kg/m²        Intake/Output Summary (Last 24 hours) at 7/7/2019 0721  Last data filed at 7/7/2019 0527  Gross per 24 hour   Intake 781 25 ml   Output 775 ml   Net 6 25 ml       Invasive Devices     Peripheral Intravenous Line            Peripheral IV 07/05/19 Right Arm 1 day                Physical Exam:   Gen: NAD, A&O, Comfortable   Chest: Normal work of breathing, no resp distress  Abd: S, ND, tender over mid and right abd, no rebound or gaurding  Ext: No edema  Skin: warm, dry, intact      Lab, Imaging and other studies:  CBC:   Lab Results   Component Value Date    WBC 10 98 (H) 07/07/2019    HGB 8 6 (L) 07/07/2019    HCT 27 7 (L) 07/07/2019    MCV 98 07/07/2019     07/07/2019    MCH 30 3 07/07/2019    MCHC 31 0 (L) 07/07/2019    RDW 13 9 07/07/2019    MPV 9 8 07/07/2019   , CMP:   Lab Results   Component Value Date    SODIUM 145 07/07/2019    K 3 5 07/07/2019     (H) 07/07/2019    CO2 27 07/07/2019    BUN 19 07/07/2019 CREATININE 0 73 07/07/2019    CALCIUM 8 2 (L) 07/07/2019    AST 32 07/07/2019     (H) 07/07/2019    ALKPHOS 46 07/07/2019    EGFR 119 07/07/2019   , Coagulation: No results found for: PT, INR, APTT, Urinalysis: No results found for: COLORU, CLARITYU, SPECGRAV, PHUR, LEUKOCYTESUR, NITRITE, PROTEINUA, GLUCOSEU, KETONESU, BILIRUBINUR, BLOODU, Amylase: No results found for: AMYLASE, Lipase: No results found for: LIPASE  VTE Pharmacologic Prophylaxis: Heparin  VTE Mechanical Prophylaxis: sequential compression device

## 2019-07-07 NOTE — PROGRESS NOTES
[facility] Progress Note  POD#9     21 y/o F s/p elective bimaxillary surgery  No overnight issues  She tolerates clear liquids, no vomiting since yesterday  Pain to abdomen/ [place] is 3/10, controlled with ketorolac  Transaminases are trending down  She is scheduled for HIDA scan  I changed the dental rubber  Oral wounds are healing well  She performed oral care with toothbrush and I placed her back in maxillo-mandibular closed position with new rubber bands  The occlusion is good        P/E  GUS: resolving swelling to face  Nose: patent airway  Mouth: good intercuspidation in the occlusal splint  Sutures to upper/lower vestibule intact and hemostatic  No wound dehiscence    Neck: supple     CVS: RRR, S1/S2  CHEST: CTA B/L  ABD: +BS, + mild tenderness to epigastric and RUQ        A:   s/p bimaxillary jaw surgery- healing well  Abdominal pain-improving     Plan:     OOB, encourage hourly ambulation  Nasal saline spray 4x/day  Tooth brushing and chlorhexidine rinse 5x/day  Ketorolac PRN  Morphine PRN  Bisacodyl suppository  F/U on HIDA scan   F/U on Medicine  F/U on Surgery     Nutrition:  PO feeding goal: 60cc/ hour with Marcel syringe - clear liquids for now  IV fluids to cover deficit

## 2019-07-07 NOTE — ASSESSMENT & PLAN NOTE
I am very suspicious of acute cholecystitis  Monitor electrolytes, K, Mg, Phos and replete  Unsure why Zofran did not work, Frantz Benitez works  Can use phenergan prn as well  Not tolerating CLD  Passing flatulence  Did not have bowel movement, start bowel regimen when better tolerating PO  Monitor closely with supportive care    Liquid PPI

## 2019-07-07 NOTE — PROGRESS NOTES
Progress Note - Kelly Calle 2000, 20 y o  female MRN: [id number]    Unit/Bed#: -01 Encounter: [id number]    Primary Care Provider: Anamaria Mackenzie MD   Date and time admitted to hospital: 6/28/2019  6:20 AM        Cholecystitis, acute  Assessment & Plan  RUQ US suspicious for cholecystitis - I have discussed w/ surgery  Pt now tolerating CLD and now RUQ pain resolved  Gen surg appreciated  HIDA scan ordered - if positive, pt would prefer to delay surgery and tx conservatively with antibiotics    Elevated liver enzymes  Assessment & Plan  Suspect cholecysitits  Trending down    Hypokalemia  Assessment & Plan  Hypokalemia due to vomiting, this might be related   D5 1/2NS with 40 mEq potassium   Monitor labs    Post-operative nausea and vomiting  Assessment & Plan  I am very suspicious of acute cholecystitis  Monitor electrolytes, K, Mg, Phos and replete  Unsure why Zofran did not work, Chava Listen works  Can use phenergan prn as well  Not tolerating CLD  Passing flatulence  Did not have bowel movement, start bowel regimen when better tolerating PO  Monitor closely with supportive care  Liquid PPI         Anemia  Assessment & Plan  Will check FOBT  Pos ABLA for surgery    * Skeletal deformity of jaw  Assessment & Plan  S/p surgery  Per primary team    VTE Pharmacologic Prophylaxis:   Pharmacologic: Heparin  Mechanical VTE Prophylaxis in Place: Yes    Patient Centered Rounds: I have performed bedside rounds with nursing staff today  Discussions with Specialists or Other Care Team Provider[place] [facility] [place]    Education and Discussions with Family / Patient: pt    Time Spent for Care: 30 minutes  More than 50% of total time spent on counseling and coordination of care as described above      Current Length of Stay: 9 day(s)    Current Patient Status: Inpatient       Discharge Plan: pending HIDA report    Code Status: Level 1 - Full Code      Subjective:   Denies abd pain or nausea    Objective:     Vitals:   Temp (24hrs), Av 1 °F (36 7 °C), Min:97 9 °F (36 6 °C), Max:98 2 °F (36 8 °C)    Temp:  [97 9 °F (36 6 °C)-98 2 °F (36 8 °C)] 98 2 °F (36 8 °C)  HR:  [62-88] 88  Resp:  [18] 18  BP: (117-124)/(63-78) 117/78  SpO2:  [98 %] 98 %  Body mass index is 21 99 kg/m²  Input and Output Summary (last 24 hours): Intake/Output Summary (Last 24 hours) at 2019 1647  Last data filed at 2019 1401  Gross per 24 hour   Intake 1423 75 ml   Output 975 ml   Net 448 75 ml       Physical Exam:     Physical Exam   Constitutional: No distress  HENT:   Head: Normocephalic and atraumatic  Eyes: Conjunctivae and EOM are normal    Neck: Normal range of motion  Neck supple  Cardiovascular: Normal rate and regular rhythm  Pulmonary/Chest: Effort normal and breath sounds normal  She has no wheezes  She has no rales  Abdominal: Soft  Bowel sounds are normal  There is tenderness (periumbilical)  Musculoskeletal: Normal range of motion  She exhibits no edema  Neurological: She is alert  Skin: Skin is warm and dry  She is not diaphoretic  Additional Data:     Labs:    Results from last 7 days   Lab Units 19  0530 19  0504   WBC Thousand/uL 10 98* 11 92*   HEMOGLOBIN g/dL 8 6* 9 3*   HEMATOCRIT % 27 7* 28 8*   PLATELETS Thousands/uL 238 225   NEUTROS PCT %  --  68   LYMPHS PCT %  --  20   MONOS PCT %  --  11   EOS PCT %  --  0     Results from last 7 days   Lab Units 19  0530   POTASSIUM mmol/L 3 5   CHLORIDE mmol/L 112*   CO2 mmol/L 27   BUN mg/dL 19   CREATININE mg/dL 0 73   CALCIUM mg/dL 8 2*   ALK PHOS U/L 46   ALT U/L 112*   AST U/L 32           * I Have Reviewed All Lab Data Listed Above  * Additional Pertinent Lab Tests Reviewed:  [facility]        Recent Cultures (last 7 days):     Results from last 7 days   Lab Units 19  0853   SPUTUM CULTURE  No growth   GRAM STAIN RESULT  No Epithelial cells seen  4+ Polys  2+ Mononuclear Cells  No bacteria seen       Last 24 Hours Medication List:     Current Facility-Administered Medications:  acetaminophen 650 mg Oral Q4H PRN Yovani Robb DMD    bisacodyl 10 mg Rectal Daily Yovani Robb DMD    chlorhexidine 30 mL Swish & Spit 5x Daily Rebeka Kitchen MD    dextrose 5 % and sodium chloride 0 45 % with KCl 40 mEq/L 75 mL/hr Intravenous Continuous Radha Scott DO Last Rate: 75 mL/hr (07/07/19 0527)   diphenhydrAMINE 25 mg Intravenous HS PRN Yovani Robb DMD    heparin (porcine) 5,000 Units Subcutaneous [facility] Rebeka Kitchen MD    ketorolac 15 mg Intravenous Q6H PRN Yovani Robb DMD    metoclopramide 5 mg Intravenous Q6H PRN Samuel Dominguez MD    morphine injection 2 mg Intravenous Q2H PRN Yovani Robb DMD    [START ON 7/8/2019] omeprazole (PRILOSEC) suspension 2 mg/mL 20 mg Oral Daily Radha Scott DO    promethazine 12 5 mg Intravenous Q12H PRN Radha Scott DO    sodium chloride 1 spray Each Nare 4x Daily Rebeka Kitchen MD         Today, Patient Was Seen By: Radha Scott DO    ** Please Note: Dictation voice to text software may have been used in the creation of this document   **

## 2019-07-07 NOTE — DISCHARGE INSTRUCTIONS
POST OPERATIVE INSTRUCTIONS - ORTHOGNATHIC SURGERY    DISCHARGE & HOME CARE:   Your surgeon will provide prescriptions for medications to be taken at home  They may include analgesics to relieve discomfort, antibiotics to prevent infection, and a mouth rinse to keep the mouth clean  For upper jaw surgery, a nasal spray may be recommended  DIET:  Your diet will consist of full liquids, such as milk shakes, smoothies, soups, etc  Supplements such as Ensure® or [place]® Instant Breakfast can be used as well  For proper recovery from the surgery, it is important to consume an adequate number of calories per day (think in terms of 1500--2500 [place] )  It is important to consume a minimum of 2000 ml (two quarts) of liquid per day to avoid dehydration  Your surgeon will give you a table to help keep track of how much you drink every day  The large syringe used in the hospital may be used initially for getting food and water in the mouth  You are not allowed to use a straw  You can use a syringe, a spoon, or drink from a cup  You should not attempt to chew on any food till the surgeon tells you so  Do not try to open your teeth against the rubber bands or wires  MOUTH CARE:   You should gently brush your teeth 5 times per day, as your surgeon instructed you  You should use a soft toothbrush and the oral rinse you have been prescribed  You can not use a water-pick until the surgeon allows you  Your doctor may have advised you on performing lip movement exercises  If this is the case, continue doing them as instructed  SWELLING: Swelling is a prominent feature of recovery from jaw surgery  Ice placed on the sides of the face during the first 48 hours is helpful in moderating the swelling  After 48 hours, the swelling will begin to decrease  Ice is no longer of any benefit after the first 48 hours; however, it can be continued if it makes the face feel better   External heat in the form of moist warm compress will help to soften and promote reduction of swelling  Call the office if swelling continues to increase after the fourth post-operative day  Swelling will be most prominent in the first few days after surgery  Swelling should reduce by about 50% after seven days, by about 80% after 14 days, and by about 90% after one month  Residual swelling may take several months to resolve  It may be more comfortable to sleep with the head of the bed elevated or with extra pillows  NUMBNESS: This is a common side effect from the surgery  Depending on the type of surgery you had, it may involve the lips, chin, teeth, gum tissues, roof of the mouth, skin next to the nose, and tongue  Most of the time, the numbness will improve over several weeks or months  DISCOLORATION: It is common to develop bluish, greenish, or yellow skin discoloration after the surgery  This is from old blood that is breaking down after surgery  The discoloration will dissipate and travel down the neck over several days or a week  RUBBER BANDS: These are commonly used after jaw surgery and are intended to guide the bite after surgery  Your doctor will instruct you in their use  If you break a rubber band, call the office during office hours for advice  This is not an emergency  PAIN MANAGEMENT: You will be given a prescription medication for pain control  Most of the time, this will be in liquid form  It is important to remember that the average adult should not use more than 4000 mg of acetaminophen in 24 hours  Note that all of the above medications contain acetaminophen  You do not want to combine these medications with any other medication containing acetaminophen (Tylenol®)  Often, a narcotic medication is used as a supplement  Side effects of narcotics include nausea, vomiting, sedation, and constipation  If you encounter nausea or vomiting, stop the narcotic medication and call the office   Medication to control nausea can be prescribed and a different narcotic may be prescribed as well  Constipation may require a stool softener or laxative  Using less of the narcotic medication can moderate all of these side effects  If your pain control is inadequate with the medication you have been prescribed, call the office for assistance  ANTIBIOTICS: You may be prescribed an antibiotic to reduce the possibility of infection after surgery  You should take the medication as prescribed  Contact the office if you develop a rash, itching, or diarrhea from the medication  DECONGESTANTS: If you had upper jaw surgery, you may be prescribed a nasal sprays, this is useful for the relief from nasal congestion  O[place] Mist® saline nasal spray or humidified air may help with dried secretions in the nasal passages  Do not blow your nose until the doctor allows  BLEEDING: Initially there will be blood mixed with saliva and, for upper jaw surgery, bloody drainage from the nose  The nasal discharge is due to accumulated blood in the sinus areas  Most of this will drain down the back of the throat  This drainage will taper off over the first few days  Heavy fresh bleeding is not normal and should be brought to the attention of the doctor  Call the office if this occurs  SORE THROAT: This is common after the surgery  It is due to the breathing tube used during surgery  It will pass within a day or two  ACTIVITY: It is common to feel tired after surgery  Fatigue is due to blood loss during surgery, increased energy demand for healing, altered diet, and the use of narcotic pain relievers  Rest, proper nutrition, and hydration are appropriate  Do not push yourself  Your body will tell you when you are ready to be more active  The first week is when you are most likely to need rest      FOLLOW-UP APPOINTMENTS: Typically, you will be seen within seven days of surgery  Call for an appointment if you do not have one     WHEN TO CONTACT YOUR DOCTOR:   Nausea and vomiting Inadequate pain control   Persistent Diarrhea   Active Bleeding   Swelling that is increasing after the fourth post-operative day   Foul discharge from the surgical sites   Temperature above 101 5° F      Scheduling Concerns: May call [facility] for Oral and Maxillofacial Surgery at [phone]  In case of EMERGENCY: go directly to EMERGENCY ROOM at [facility] and ask for the Oral Surgeon on call  [facility] phone number is: [phone]

## 2019-07-08 ENCOUNTER — APPOINTMENT (INPATIENT)
Dept: RADIOLOGY | Facility: HOSPITAL | Age: 19
DRG: 089 | End: 2019-07-08
Payer: COMMERCIAL

## 2019-07-08 VITALS
HEART RATE: 69 BPM | SYSTOLIC BLOOD PRESSURE: 117 MMHG | WEIGHT: 128.09 LBS | BODY MASS INDEX: 21.87 KG/M2 | HEIGHT: 64 IN | OXYGEN SATURATION: 99 % | DIASTOLIC BLOOD PRESSURE: 59 MMHG | RESPIRATION RATE: 16 BRPM | TEMPERATURE: 97.5 F

## 2019-07-08 DIAGNOSIS — R74.8 ELEVATED LIVER ENZYMES: ICD-10-CM

## 2019-07-08 PROBLEM — R10.11 RUQ PAIN: Status: ACTIVE | Noted: 2019-07-06

## 2019-07-08 LAB
ALBUMIN SERPL BCP-MCNC: 3.1 G/DL (ref 3.5–5)
ALP SERPL-CCNC: 58 U/L (ref 46–116)
ALT SERPL W P-5'-P-CCNC: 102 U/L (ref 12–78)
ANION GAP SERPL CALCULATED.3IONS-SCNC: 6 MMOL/L (ref 4–13)
AST SERPL W P-5'-P-CCNC: 25 U/L (ref 5–45)
BILIRUB SERPL-MCNC: 0.66 MG/DL (ref 0.2–1)
BUN SERPL-MCNC: 10 MG/DL (ref 5–25)
CALCIUM SERPL-MCNC: 8.9 MG/DL (ref 8.3–10.1)
CHLORIDE SERPL-SCNC: 107 MMOL/L (ref 100–108)
CO2 SERPL-SCNC: 27 MMOL/L (ref 21–32)
CREAT SERPL-MCNC: 0.6 MG/DL (ref 0.6–1.3)
ERYTHROCYTE [DISTWIDTH] IN BLOOD BY AUTOMATED COUNT: 13.3 % (ref 11.6–15.1)
GFR SERPL CREATININE-BSD FRML MDRD: 132 ML/MIN/1.73SQ M
GLUCOSE SERPL-MCNC: 84 MG/DL (ref 65–140)
HCT VFR BLD AUTO: 30.7 % (ref 34.8–46.1)
HGB BLD-MCNC: 9.9 G/DL (ref 11.5–15.4)
MCH RBC QN AUTO: 30.5 PG (ref 26.8–34.3)
MCHC RBC AUTO-ENTMCNC: 32.2 G/DL (ref 31.4–37.4)
MCV RBC AUTO: 95 FL (ref 82–98)
PLATELET # BLD AUTO: 296 THOUSANDS/UL (ref 149–390)
PMV BLD AUTO: 9.9 FL (ref 8.9–12.7)
POTASSIUM SERPL-SCNC: 4 MMOL/L (ref 3.5–5.3)
PROT SERPL-MCNC: 6.1 G/DL (ref 6.4–8.2)
RBC # BLD AUTO: 3.25 MILLION/UL (ref 3.81–5.12)
SODIUM SERPL-SCNC: 140 MMOL/L (ref 136–145)
WBC # BLD AUTO: 13.02 THOUSAND/UL (ref 4.31–10.16)

## 2019-07-08 PROCEDURE — 99232 SBSQ HOSP IP/OBS MODERATE 35: CPT | Performed by: SURGERY

## 2019-07-08 PROCEDURE — 85027 COMPLETE CBC AUTOMATED: CPT | Performed by: GENERAL PRACTICE

## 2019-07-08 PROCEDURE — 78226 HEPATOBILIARY SYSTEM IMAGING: CPT

## 2019-07-08 PROCEDURE — 80053 COMPREHEN METABOLIC PANEL: CPT | Performed by: GENERAL PRACTICE

## 2019-07-08 PROCEDURE — 99232 SBSQ HOSP IP/OBS MODERATE 35: CPT | Performed by: GENERAL PRACTICE

## 2019-07-08 PROCEDURE — A9537 TC99M MEBROFENIN: HCPCS

## 2019-07-08 RX ORDER — OMEPRAZOLE 10 MG/1
10 CAPSULE, DELAYED RELEASE ORAL DAILY
Qty: 14 CAPSULE | Refills: 0 | Status: CANCELLED | OUTPATIENT
Start: 2019-07-08 | End: 2019-07-22

## 2019-07-08 RX ORDER — OMEPRAZOLE 10 MG/1
10 CAPSULE, DELAYED RELEASE ORAL DAILY
Qty: 14 CAPSULE | Refills: 0 | Status: SHIPPED | OUTPATIENT
Start: 2019-07-08 | End: 2019-07-22

## 2019-07-08 RX ORDER — CHLORHEXIDINE GLUCONATE 0.12 MG/ML
30 RINSE ORAL
Qty: 473 ML | Refills: 1 | Status: SHIPPED | OUTPATIENT
Start: 2019-07-08

## 2019-07-08 RX ORDER — LORAZEPAM 2 MG/ML
0.5 CONCENTRATE ORAL 2 TIMES DAILY PRN
Qty: 5 ML | Refills: 0 | Status: SHIPPED | OUTPATIENT
Start: 2019-07-08 | End: 2019-07-18

## 2019-07-08 RX ORDER — ALBUTEROL SULFATE 90 UG/1
2 AEROSOL, METERED RESPIRATORY (INHALATION) EVERY 6 HOURS PRN
COMMUNITY
Start: 2018-11-06 | End: 2019-11-06

## 2019-07-08 RX ORDER — DEXTROSE AND SODIUM CHLORIDE 5; .45 G/100ML; G/100ML
40 INJECTION, SOLUTION INTRAVENOUS CONTINUOUS
Status: DISCONTINUED | OUTPATIENT
Start: 2019-07-08 | End: 2019-07-08

## 2019-07-08 RX ORDER — POLYETHYLENE GLYCOL 3350 17 G/17G
17 POWDER, FOR SOLUTION ORAL DAILY
Status: DISCONTINUED | OUTPATIENT
Start: 2019-07-08 | End: 2019-07-08 | Stop reason: HOSPADM

## 2019-07-08 RX ORDER — ACETAMINOPHEN 500 MG
500 TABLET ORAL EVERY 6 HOURS PRN
Qty: 30 TABLET | Refills: 0 | Status: SHIPPED | OUTPATIENT
Start: 2019-07-08

## 2019-07-08 RX ADMIN — DEXTROSE AND SODIUM CHLORIDE 40 ML/HR: 5; .45 INJECTION, SOLUTION INTRAVENOUS at 13:26

## 2019-07-08 RX ADMIN — CHLORHEXIDINE GLUCONATE 0.12% ORAL RINSE 30 ML: 1.2 LIQUID ORAL at 07:18

## 2019-07-08 RX ADMIN — WATER 5 ML: 1 INJECTION INTRAMUSCULAR; INTRAVENOUS; SUBCUTANEOUS at 07:05

## 2019-07-08 RX ADMIN — CHLORHEXIDINE GLUCONATE 0.12% ORAL RINSE 30 ML: 1.2 LIQUID ORAL at 11:39

## 2019-07-08 RX ADMIN — CHLORHEXIDINE GLUCONATE 0.12% ORAL RINSE 30 ML: 1.2 LIQUID ORAL at 15:23

## 2019-07-08 RX ADMIN — Medication 1 SPRAY: at 08:34

## 2019-07-08 RX ADMIN — Medication 1 SPRAY: at 18:33

## 2019-07-08 RX ADMIN — CHLORHEXIDINE GLUCONATE 0.12% ORAL RINSE 30 ML: 1.2 LIQUID ORAL at 18:30

## 2019-07-08 RX ADMIN — Medication 1 SPRAY: at 12:48

## 2019-07-08 RX ADMIN — HEPARIN SODIUM 5000 UNITS: 5000 INJECTION INTRAVENOUS; SUBCUTANEOUS at 05:07

## 2019-07-08 RX ADMIN — POLYETHYLENE GLYCOL 3350 17 G: 17 POWDER, FOR SOLUTION ORAL at 13:27

## 2019-07-08 RX ADMIN — HEPARIN SODIUM 5000 UNITS: 5000 INJECTION INTRAVENOUS; SUBCUTANEOUS at 13:26

## 2019-07-08 RX ADMIN — SINCALIDE 1.2 MCG: 5 INJECTION, POWDER, LYOPHILIZED, FOR SOLUTION INTRAVENOUS at 07:23

## 2019-07-08 NOTE — QUICK NOTE
Progress note update:    HIDA scan negative for cholecystitis  Diet as tolerated from a general surgery standpoint  Will sign off at this time  Please [facility] [facility] pager with any questions or concerns      Natty Yu MD  PGY3, Gen Surg

## 2019-07-08 NOTE — PROGRESS NOTES
Progress Note - General Surgery   Levell Bulmaro 20 y o  female MRN: [id number]  Unit/Bed#: -01 Encounter: [id number]    Assessment:  21 y/o F presenting for elective bimaxillary surgery, now w/ RUQ abdominal pain, U/S concerning for acute cholecystitis    Plan:  --f/u HIDA scan  --f/u AM LFTs  --Serial abdominal exams  --Continue PPI for possible gastritis    Subjective/Objective     Subjective:     No acute events overnight  Pt reports her abdominal pain is improved this AM  Denies any N/V/D    Objective:    Blood pressure 115/70, pulse 67, temperature 97 8 °F (36 6 °C), temperature source Axillary, resp  rate 18, height 5' 4" (1 626 m), weight 58 1 kg (128 lb 1 4 oz), SpO2 100 %  ,Body mass index is 21 99 kg/m²  Intake/Output Summary (Last 24 hours) at 7/8/2019 0535  Last data filed at 7/8/2019 0000  Gross per 24 hour   Intake 1230 ml   Output 1500 ml   Net -270 ml       Invasive Devices     Peripheral Intravenous Line            Peripheral IV 07/05/19 Right Arm 2 days                Physical Exam:     GEN: NAD  HEENT: MMM  CV: RRR  Lung: normal effort  Ab: Soft, ND, RUQ tenderness  Extrem: No CCE  Neuro:  A+Ox3, motor and sensation grossly intact    Lab, Imaging and other studies:CBC: No results found for: WBC, HGB, HCT, MCV, PLT, ADJUSTEDWBC, MCH, MCHC, RDW, MPV, NRBC, CMP: No results found for: SODIUM, K, CL, CO2, ANIONGAP, BUN, CREATININE, GLUCOSE, CALCIUM, AST, ALT, ALKPHOS, PROT, BILITOT, EGFR, Coagulation: No results found for: PT, INR, APTT, Urinalysis: No results found for: COLORU, CLARITYU, SPECGRAV, PHUR, LEUKOCYTESUR, NITRITE, PROTEINUA, GLUCOSEU, KETONESU, BILIRUBINUR, BLOODU, Amylase: No results found for: AMYLASE, Lipase: No results found for: LIPASE  VTE Pharmacologic Prophylaxis: Heparin  VTE Mechanical Prophylaxis: sequential compression device

## 2019-07-08 NOTE — UTILIZATION REVIEW
Elective Surgical Continued Stay Review    Date: 7/8/19 Monday                 POD #:  10        Current Patient Class:   INPATIENT  Current Level of Care: ACUTE MED SURG LEVEL     HPI: 20 y o  female  Surgery Date: 6/28/2019  Procedure: BRONCHOSCOPY FLEXIBLE, CONTROLLED INTUBATION (N/A)  GENIOPLASTY, LEFORTE I, BILATERAL SAGITAL SPLIT RAMUS OSTEOTOMY, SEPTOPLASTY, INFERIOR TURBINATE REDUCTION     CURRENT  Assessment/Plan  Assessment:  21 y/o F presenting for elective bimaxillary surgery, now w/ RUQ abdominal pain, U/S concerning for acute cholecystitis     Plan:  --f/u HIDA scan  --f/u AM LFTs  --Serial abdominal exams  --Continue PPI for possible gastritis    Pertinent Labs/Diagnostic Results:  Results from last 7 days   Lab Units 07/07/19  0530 07/06/19  0504   WBC Thousand/uL 10 98* 11 92*   HEMOGLOBIN g/dL 8 6* 9 3*   HEMATOCRIT % 27 7* 28 8*   PLATELETS Thousands/uL 238 225   NEUTROS PCT %  --  68   LYMPHS PCT %  --  20   MONOS PCT %  --  11   EOS PCT %  --  0      Results from last 7 days   Lab Units 07/07/19  0530   POTASSIUM mmol/L 3 5   CHLORIDE mmol/L 112*   CO2 mmol/L 27   BUN mg/dL 19   CREATININE mg/dL 0 73   CALCIUM mg/dL 8 2*   ALK PHOS U/L 46   ALT U/L 112*   AST U/L 32        Vital Signs:   Blood pressure 115/70, pulse 67, temperature 97 8 °F (36 6 °C), temperature source Axillary, resp  rate 18, height 5' 4" (1 626 m), weight 58 1 kg (128 lb 1 4 oz), SpO2 100 %  ,Body mass index is 21 99 kg/m²       Intake/Output Summary (Last 24 hours) at 7/8/2019 0535  Last data filed at 7/8/2019 0000  Gross per 24 hour   Intake 1230 ml   Output 1500 ml   Net -270 ml       Medications:   Scheduled Meds:   Current Facility-Administered Medications:  acetaminophen 650 mg Oral Q4H PRN   bisacodyl 10 mg Rectal Daily   chlorhexidine 30 mL Swish & Spit 5x Daily   diphenhydrAMINE 25 mg Intravenous HS PRN   heparin (porcine) 5,000 Units Subcutaneous Q8H [facility]   ketorolac 15 mg Intravenous Q6H PRN   metoclopramide 5 mg Intravenous Q6H PRN   omeprazole (PRILOSEC) suspension 2 mg/mL 20 mg Oral Daily   polyethylene glycol 17 g Oral Daily   promethazine 12 5 mg Intravenous Q12H PRN   sodium chloride 1 spray Each Nare 4x Daily     Discharge Plan:   [address] MEDICALLY CLEARED  CASE MANAGEMENT FOLLOWING CLOSELY FOR ALL DISCHARGE NEEDS      Network Utilization Review Department  Phone: [phone]; Fax [phone]  [email or web address]  ATTENTION: Please call with any questions or concerns to [phone]  and carefully listen to the prompts so that you are directed to the right person  Send all requests for admission clinical reviews, approved or denied determinations and any other requests to fax [phone]   All voicemails are confidential

## 2019-07-08 NOTE — PLAN OF CARE
Problem: Prexisting or High Potential for Compromised Skin Integrity  Goal: Skin integrity is maintained or improved  Description  INTERVENTIONS:  - Identify patients at risk for skin breakdown  - Assess and monitor skin integrity  - Assess and monitor nutrition and hydration status  - Monitor labs (i e  albumin)  - Assess for incontinence   - Turn and reposition patient  - Assist with mobility/ambulation  - Relieve pressure over bony prominences  - Avoid friction and shearing  - Provide appropriate hygiene as needed including keeping skin clean and dry  - Evaluate need for skin moisturizer/barrier cream  - Collaborate with interdisciplinary team (i e  Nutrition, Rehabilitation, etc )   - Patient/family teaching  Outcome: Adequate for Discharge     Problem: Potential for Falls  Goal: Patient will remain free of falls  Description  INTERVENTIONS:  - Assess patient frequently for physical needs  -  Identify cognitive and physical deficits and behaviors that affect risk of falls    -  [place] fall precautions as indicated by assessment   - Educate patient/family on patient safety including physical limitations  - Instruct patient to call for assistance with activity based on assessment  - Modify environment to reduce risk of injury  - Consider OT/PT consult to assist with strengthening/mobility  Outcome: Adequate for Discharge     Problem: PAIN - ADULT  Goal: Verbalizes/displays adequate comfort level or baseline comfort level  Description  Interventions:  - Encourage patient to monitor pain and request assistance  - Assess pain using appropriate pain scale  - Administer analgesics based on type and severity of pain and evaluate response  - Implement non-pharmacological measures as appropriate and evaluate response  - Consider cultural and social influences on pain and pain management  - Notify physician/advanced practitioner if interventions unsuccessful or patient reports new pain  Outcome: Adequate for Discharge     Problem: INFECTION - ADULT  Goal: Absence or prevention of progression during hospitalization  Description  INTERVENTIONS:  - Assess and monitor for signs and symptoms of infection  - Monitor lab/diagnostic results  - Monitor all insertion sites, i e  indwelling lines, tubes, and drains  - Monitor endotracheal (as able) and nasal secretions for changes in amount and color  - [place] appropriate cooling/warming therapies per order  - Administer medications as ordered  - Instruct and encourage patient and family to use good hand hygiene technique  - Identify and instruct in appropriate isolation precautions for identified infection/condition  Outcome: Adequate for Discharge  Goal: Absence of fever/infection during neutropenic period  Description  INTERVENTIONS:  - Monitor WBC  - Implement neutropenic guidelines  Outcome: Adequate for Discharge     Problem: SAFETY ADULT  Goal: Patient will remain free of falls  Description  INTERVENTIONS:  - Assess patient frequently for physical needs  -  Identify cognitive and physical deficits and behaviors that affect risk of falls    -  [place] fall precautions as indicated by assessment   - Educate patient/family on patient safety including physical limitations  - Instruct patient to call for assistance with activity based on assessment  - Modify environment to reduce risk of injury  - Consider OT/PT consult to assist with strengthening/mobility  Outcome: Adequate for Discharge  Goal: Maintain or return to baseline ADL function  Description  INTERVENTIONS:  -  Assess patient's ability to carry out ADLs; assess patient's baseline for ADL function and identify physical deficits which impact ability to perform ADLs (bathing, care of mouth/teeth, toileting, grooming, dressing, etc )  - Assess/evaluate cause of self-care deficits   - Assess range of motion  - Assess patient's mobility; develop plan if impaired  - Assess patient's need for assistive devices and provide as appropriate  - Encourage maximum independence but intervene and supervise when necessary  ¯ Involve family in performance of ADLs  ¯ Assess for home care needs following discharge   ¯ Request OT consult to assist with ADL evaluation and planning for discharge  ¯ Provide patient education as appropriate  Outcome: Adequate for Discharge  Goal: Maintain or return mobility status to optimal level  Description  INTERVENTIONS:  - Assess patient's baseline mobility status (ambulation, transfers, stairs, etc )    - Identify cognitive and physical deficits and behaviors that affect mobility  - Identify mobility aids required to assist with transfers and/or ambulation (gait belt, sit-to-stand, lift, walker, cane, etc )  - [place] fall precautions as indicated by assessment  - Record patient progress and toleration of activity level on Mobility SBAR; progress patient to next Phase/Stage  - Instruct patient to call for assistance with activity based on assessment  - Request Rehabilitation consult to assist with strengthening/weightbearing, etc   Outcome: Adequate for Discharge     Problem: DISCHARGE PLANNING  Goal: Discharge to home or other facility with appropriate resources  Description  INTERVENTIONS:  - Identify barriers to discharge w/patient and caregiver  - Arrange for needed discharge resources and transportation as appropriate  - Identify discharge learning needs (meds, wound care, etc )  - Arrange for interpretive services to assist at discharge as needed  - Refer to Case Management Department for coordinating discharge planning if the patient needs post-hospital services based on physician/advanced practitioner order or complex needs related to functional status, cognitive ability, or social support system  Outcome: Adequate for Discharge     Problem: Knowledge Deficit  Goal: Patient/family/caregiver demonstrates understanding of disease process, treatment plan, medications, and discharge instructions  Description  Complete learning assessment and assess knowledge base  Interventions:  - Provide teaching at level of understanding  - Provide teaching via preferred learning methods  Outcome: Adequate for Discharge     Problem: COPING  Goal: Will report anxiety at manageable levels  Description  INTERVENTIONS:  - Administer medication as ordered  - Teach and encourage coping skills  - Provide emotional support  - Assess patient/family for anxiety and ability to cope  Outcome: Adequate for Discharge     Problem: Nutrition/Hydration-ADULT  Goal: Nutrient/Hydration intake appropriate for improving, restoring or maintaining nutritional needs  Description  Monitor and assess patient's nutrition/hydration status for malnutrition (ex- brittle hair, bruises, dry skin, pale skin and conjunctiva, muscle wasting, smooth red tongue, and disorientation)  Collaborate with interdisciplinary team and initiate plan and interventions as ordered  Monitor patient's weight and dietary intake as ordered or per policy  Utilize nutrition screening tool and intervene per policy  Determine patient's food preferences and provide high-protein, high-caloric foods as appropriate       INTERVENTIONS:  - Monitor oral intake, urinary output, labs, and treatment plans  - Assess nutrition and hydration status and recommend course of action  - Evaluate amount of meals eaten  - Assist patient with eating if necessary   - Allow adequate time for meals  - Recommend/ encourage appropriate diets, oral nutritional supplements, and vitamin/mineral supplements  - Order, calculate, and assess calorie counts as needed  - Recommend, monitor, and adjust tube feedings and TPN/PPN based on assessed needs  - Assess need for intravenous fluids  - Provide specific nutrition/hydration education as appropriate  - Include patient/family/caregiver in decisions related to nutrition  Outcome: Adequate for Discharge

## 2019-07-08 NOTE — PROGRESS NOTES
[facility] Progress Note  POD#10     21 y/o F s/p elective bimaxillary surgery  No overnight issues  She feels well, abdominal discomfort 1/10  HIDA scan today came negative       P/E  GUS: resolving swelling to face  Nose: patent airway  Mouth: good intercuspidation in the occlusal splint  Sutures to upper/lower vestibule intact and hemostatic  No wound dehiscence    Neck: supple     CVS: RRR, S1/S2  CHEST: CTA B/L  ABD: +BS, + mild tenderness to epigastric and RUQ        A:   s/p bimaxillary jaw surgery- healing well  Cholecystitis is r/o       Plan:    Start full liquid diet   Stop IVF  Ambulation  Nasal saline spray 4x/day  Tooth brushing and chlorhexidine rinse 5x/day  [place] PRN  D/C tonight if tolerating well PO diet

## 2019-07-08 NOTE — PROGRESS NOTES
Progress Note - Malcom Jin 2000, 20 y o  female MRN: [id number]    Unit/Bed#: -01 Encounter: [id number]    Primary Care Provider: Torsten Florence MD   Date and time admitted to hospital: 2019  6:20 AM        RUQ pain  Assessment & Plan  RUQ US suspicious for cholecystitis - I have discussed w/ surgery  Pt now tolerating CLD and now RUQ pain resolved  Gen surg appreciated  HIDA scan negative   Rec f/u w/ GI as pt had mild intrahepatic biliary dilatation    Elevated liver enzymes  Assessment & Plan  Possible gallstone  Trending down - CMP outpt    Hypokalemia  Assessment & Plan  Hypokalemia due to vomiting, this might be related   Resolved w/ IVF w/ K    Post-operative nausea and vomiting  Assessment & Plan  I was very suspicious of acute cholecystitis but HIDA WNL  Monitor electrolytes, K, Mg, Phos and replete  Unsure why Zofran did not work, [employer] works  Can use phenergan prn as well  Passing flatulence  Monitor closely with supportive care  Liquid PPI  Possible pt passed gallstone - outpt GI f/u         Anemia  Assessment & Plan  Hgb stable    * Skeletal deformity of jaw  Assessment & Plan  S/p surgery  Per primary team        Patient Centered Rounds: I have performed bedside rounds with nursing staff today  Discussions with Specialists or Other Care Team Provider: primary    Education and Discussions with Family / Patient: pt    Time Spent for Care: 30 minutes  More than 50% of total time spent on counseling and coordination of care as described above      Current Length of Stay: 10 day(s)    Current Patient Status: Inpatient       Discharge Plan: Outpt GI f/u and CMP should be checked by PCP    Code Status: Level 1 - Full Code      Subjective:   No acute complaints    Objective:     Vitals:   Temp (24hrs), Av 8 °F (36 6 °C), Min:97 5 °F (36 4 °C), Max:98 °F (36 7 °C)    Temp:  [97 5 °F (36 4 °C)-98 °F (36 7 °C)] 97 5 °F (36 4 °C)  HR:  [61-69] 69  Resp:  [16-18] 16  BP: (115-117)/(56-70) 117/59  SpO2:  [99 %-100 %] 99 %  Body mass index is 21 99 kg/m²  Input and Output Summary (last 24 hours): Intake/Output Summary (Last 24 hours) at 7/8/2019 2129  Last data filed at 7/8/2019 1650  Gross per 24 hour   Intake 1815 17 ml   Output 3800 ml   Net -1984 83 ml       Physical Exam:     Physical Exam   Constitutional: She is oriented to person, place, and time  No distress  HENT:   Head: Normocephalic and atraumatic  Eyes: Conjunctivae and EOM are normal    Neck: Normal range of motion  Neck supple  Cardiovascular: Normal rate and regular rhythm  Pulmonary/Chest: Effort normal and breath sounds normal  She has no wheezes  She has no rales  Abdominal: Soft  Bowel sounds are normal  She exhibits no distension  There is no tenderness  Musculoskeletal: Normal range of motion  She exhibits no edema  Neurological: She is alert and oriented to person, place, and time  Skin: Skin is warm and dry  She is not diaphoretic  Additional Data:     Labs:    Results from last 7 days   Lab Units 07/08/19  0530  07/06/19  0504   WBC Thousand/uL 13 02*   < > 11 92*   HEMOGLOBIN g/dL 9 9*   < > 9 3*   HEMATOCRIT % 30 7*   < > 28 8*   PLATELETS Thousands/uL 296   < > 225   NEUTROS PCT %  --   --  68   LYMPHS PCT %  --   --  20   MONOS PCT %  --   --  11   EOS PCT %  --   --  0    < > = values in this interval not displayed  Results from last 7 days   Lab Units 07/08/19  0603   POTASSIUM mmol/L 4 0   CHLORIDE mmol/L 107   CO2 mmol/L 27   BUN mg/dL 10   CREATININE mg/dL 0 60   CALCIUM mg/dL 8 9   ALK PHOS U/L 58   ALT U/L 102*   AST U/L 25           * I Have Reviewed All Lab Data Listed Above  * Additional Pertinent Lab Tests Reviewed:  Ofelia 66 Admission Reviewed        Recent Cultures (last 7 days):           Last 24 Hours Medication List:     Current Facility-Administered Medications:  acetaminophen 650 mg Oral Q4H PRN Yovani Radulescu, DMD   bisacodyl 10 mg Rectal Daily Yovani Robb DMD   chlorhexidine 30 mL Swish & Spit 5x Daily Javier Bowles MD   diphenhydrAMINE 25 mg Intravenous HS PRN Yovani Robb DMD   heparin (porcine) 5,000 Units Subcutaneous [facility] Javier Bowles MD   ketorolac 15 mg Intravenous Q6H PRN Yovani Robb DMD   metoclopramide 5 mg Intravenous Q6H PRN Lucas Brown MD   omeprazole (PRILOSEC) suspension 2 mg/mL 20 mg Oral Daily Micky Nino DO   polyethylene glycol 17 g Oral Daily Micky Nino DO   promethazine 12 5 mg Intravenous Q12H PRN Micky Nino DO   sodium chloride 1 spray Each Nare 4x Daily Javier Bowles MD        Today, Patient Was Seen By: Micky Nino DO    ** Please Note: Dictation voice to text software may have been used in the creation of this document   **

## 2019-07-09 NOTE — ASSESSMENT & PLAN NOTE
RUQ US suspicious for cholecystitis - I have discussed w/ surgery  Pt now tolerating CLD and now RUQ pain resolved  Gen surg appreciated  HIDA scan negative   Rec f/u w/ GI as pt had mild intrahepatic biliary dilatation

## 2019-07-09 NOTE — ASSESSMENT & PLAN NOTE
I was very suspicious of acute cholecystitis but HIDA WNL  Monitor electrolytes, K, Mg, Phos and replete  Unsure why Zofran did not work, [employer] works  Can use phenergan prn as well  Passing flatulence  Monitor closely with supportive care    Liquid PPI  Possible pt passed gallstone - outpt GI f/u

## 2021-03-28 ENCOUNTER — APPOINTMENT (EMERGENCY)
Dept: RADIOLOGY | Facility: HOSPITAL | Age: 21
End: 2021-03-28
Payer: COMMERCIAL

## 2021-03-28 ENCOUNTER — APPOINTMENT (EMERGENCY)
Dept: CT IMAGING | Facility: HOSPITAL | Age: 21
End: 2021-03-28
Payer: COMMERCIAL

## 2021-03-28 ENCOUNTER — HOSPITAL ENCOUNTER (EMERGENCY)
Facility: HOSPITAL | Age: 21
Discharge: HOME | End: 2021-03-28
Attending: EMERGENCY MEDICINE | Admitting: EMERGENCY MEDICINE
Payer: COMMERCIAL

## 2021-03-28 VITALS
SYSTOLIC BLOOD PRESSURE: 111 MMHG | DIASTOLIC BLOOD PRESSURE: 65 MMHG | HEART RATE: 90 BPM | RESPIRATION RATE: 22 BRPM | TEMPERATURE: 98.3 F | OXYGEN SATURATION: 98 %

## 2021-03-28 DIAGNOSIS — J02.0 STREP PHARYNGITIS: Primary | ICD-10-CM

## 2021-03-28 LAB
ALBUMIN SERPL BCP-MCNC: 3.9 G/DL (ref 3.5–5)
ALP SERPL-CCNC: 75 U/L (ref 46–116)
ALT SERPL W P-5'-P-CCNC: 16 U/L (ref 12–78)
ANION GAP SERPL CALCULATED.3IONS-SCNC: 13 MMOL/L (ref 4–13)
AST SERPL W P-5'-P-CCNC: 16 U/L (ref 5–45)
BACTERIA UR QL AUTO: NORMAL /HPF
BASOPHILS # BLD AUTO: 0.07 THOUSANDS/ΜL (ref 0–0.1)
BASOPHILS NFR BLD AUTO: 0 % (ref 0–1)
BILIRUB SERPL-MCNC: 0.7 MG/DL (ref 0.2–1)
BILIRUB UR QL STRIP: NEGATIVE
BUN SERPL-MCNC: 17 MG/DL (ref 5–25)
CALCIUM SERPL-MCNC: 9.2 MG/DL (ref 8.3–10.1)
CHLORIDE SERPL-SCNC: 100 MMOL/L (ref 100–108)
CLARITY UR: CLEAR
CO2 SERPL-SCNC: 24 MMOL/L (ref 21–32)
COLOR UR: YELLOW
CREAT SERPL-MCNC: 0.99 MG/DL (ref 0.6–1.3)
D DIMER PPP FEU-MCNC: 0.42 UG/ML FEU
EOSINOPHIL # BLD AUTO: 0 THOUSAND/ΜL (ref 0–0.61)
EOSINOPHIL NFR BLD AUTO: 0 % (ref 0–6)
ERYTHROCYTE [DISTWIDTH] IN BLOOD BY AUTOMATED COUNT: 12.7 % (ref 11.6–15.1)
EXT PREG TEST URINE: NORMAL
EXT. CONTROL ED NAV: NORMAL
FLUAV RNA RESP QL NAA+PROBE: NEGATIVE
FLUBV RNA RESP QL NAA+PROBE: NEGATIVE
GFR SERPL CREATININE-BSD FRML MDRD: 82 ML/MIN/1.73SQ M
GLUCOSE SERPL-MCNC: 111 MG/DL (ref 65–140)
GLUCOSE UR STRIP-MCNC: NEGATIVE MG/DL
HCT VFR BLD AUTO: 42.6 % (ref 34.8–46.1)
HGB BLD-MCNC: 14.2 G/DL (ref 11.5–15.4)
HGB UR QL STRIP.AUTO: ABNORMAL
IMM GRANULOCYTES # BLD AUTO: 0.1 THOUSAND/UL (ref 0–0.2)
IMM GRANULOCYTES NFR BLD AUTO: 1 % (ref 0–2)
KETONES UR STRIP-MCNC: NEGATIVE MG/DL
LACTATE SERPL-SCNC: 1.1 MMOL/L (ref 0.5–2)
LEUKOCYTE ESTERASE UR QL STRIP: NEGATIVE
LIPASE SERPL-CCNC: 100 U/L (ref 73–393)
LYMPHOCYTES # BLD AUTO: 1.03 THOUSANDS/ΜL (ref 0.6–4.47)
LYMPHOCYTES NFR BLD AUTO: 5 % (ref 14–44)
MCH RBC QN AUTO: 29.7 PG (ref 26.8–34.3)
MCHC RBC AUTO-ENTMCNC: 33.3 G/DL (ref 31.4–37.4)
MCV RBC AUTO: 89 FL (ref 82–98)
MONOCYTES # BLD AUTO: 2.3 THOUSAND/ΜL (ref 0.17–1.22)
MONOCYTES NFR BLD AUTO: 11 % (ref 4–12)
NEUTROPHILS # BLD AUTO: 17.63 THOUSANDS/ΜL (ref 1.85–7.62)
NEUTS SEG NFR BLD AUTO: 83 % (ref 43–75)
NITRITE UR QL STRIP: NEGATIVE
NON-SQ EPI CELLS URNS QL MICRO: NORMAL /HPF
NRBC BLD AUTO-RTO: 0 /100 WBCS
PH UR STRIP.AUTO: 7 [PH]
PLATELET # BLD AUTO: 263 THOUSANDS/UL (ref 149–390)
PMV BLD AUTO: 10.5 FL (ref 8.9–12.7)
POTASSIUM SERPL-SCNC: 3.7 MMOL/L (ref 3.5–5.3)
PROT SERPL-MCNC: 7.6 G/DL (ref 6.4–8.2)
PROT UR STRIP-MCNC: NEGATIVE MG/DL
RBC # BLD AUTO: 4.78 MILLION/UL (ref 3.81–5.12)
RBC #/AREA URNS AUTO: NORMAL /HPF
RSV RNA RESP QL NAA+PROBE: NEGATIVE
S PYO DNA THROAT QL NAA+PROBE: DETECTED
SARS-COV-2 RNA RESP QL NAA+PROBE: NEGATIVE
SODIUM SERPL-SCNC: 137 MMOL/L (ref 136–145)
SP GR UR STRIP.AUTO: 1.01 (ref 1–1.03)
TROPONIN I SERPL-MCNC: <0.02 NG/ML
UROBILINOGEN UR QL STRIP.AUTO: 0.2 E.U./DL
WBC # BLD AUTO: 21.13 THOUSAND/UL (ref 4.31–10.16)
WBC #/AREA URNS AUTO: NORMAL /HPF

## 2021-03-28 PROCEDURE — 99284 EMERGENCY DEPT VISIT MOD MDM: CPT

## 2021-03-28 PROCEDURE — 71275 CT ANGIOGRAPHY CHEST: CPT

## 2021-03-28 PROCEDURE — 85025 COMPLETE CBC W/AUTO DIFF WBC: CPT | Performed by: EMERGENCY MEDICINE

## 2021-03-28 PROCEDURE — 93005 ELECTROCARDIOGRAM TRACING: CPT

## 2021-03-28 PROCEDURE — 87651 STREP A DNA AMP PROBE: CPT | Performed by: EMERGENCY MEDICINE

## 2021-03-28 PROCEDURE — 96361 HYDRATE IV INFUSION ADD-ON: CPT

## 2021-03-28 PROCEDURE — 36415 COLL VENOUS BLD VENIPUNCTURE: CPT | Performed by: EMERGENCY MEDICINE

## 2021-03-28 PROCEDURE — 81025 URINE PREGNANCY TEST: CPT | Performed by: EMERGENCY MEDICINE

## 2021-03-28 PROCEDURE — 84145 PROCALCITONIN (PCT): CPT | Performed by: EMERGENCY MEDICINE

## 2021-03-28 PROCEDURE — 0241U HB NFCT DS VIR RESP RNA 4 TRGT: CPT | Performed by: EMERGENCY MEDICINE

## 2021-03-28 PROCEDURE — 83605 ASSAY OF LACTIC ACID: CPT | Performed by: EMERGENCY MEDICINE

## 2021-03-28 PROCEDURE — 71045 X-RAY EXAM CHEST 1 VIEW: CPT

## 2021-03-28 PROCEDURE — 99285 EMERGENCY DEPT VISIT HI MDM: CPT | Performed by: EMERGENCY MEDICINE

## 2021-03-28 PROCEDURE — 96375 TX/PRO/DX INJ NEW DRUG ADDON: CPT

## 2021-03-28 PROCEDURE — G1004 CDSM NDSC: HCPCS

## 2021-03-28 PROCEDURE — 96374 THER/PROPH/DIAG INJ IV PUSH: CPT

## 2021-03-28 PROCEDURE — 80053 COMPREHEN METABOLIC PANEL: CPT | Performed by: EMERGENCY MEDICINE

## 2021-03-28 PROCEDURE — 83690 ASSAY OF LIPASE: CPT | Performed by: EMERGENCY MEDICINE

## 2021-03-28 PROCEDURE — 87040 BLOOD CULTURE FOR BACTERIA: CPT | Performed by: EMERGENCY MEDICINE

## 2021-03-28 PROCEDURE — 81001 URINALYSIS AUTO W/SCOPE: CPT | Performed by: EMERGENCY MEDICINE

## 2021-03-28 PROCEDURE — 86308 HETEROPHILE ANTIBODY SCREEN: CPT | Performed by: EMERGENCY MEDICINE

## 2021-03-28 PROCEDURE — 84484 ASSAY OF TROPONIN QUANT: CPT | Performed by: EMERGENCY MEDICINE

## 2021-03-28 PROCEDURE — 74177 CT ABD & PELVIS W/CONTRAST: CPT

## 2021-03-28 PROCEDURE — 85379 FIBRIN DEGRADATION QUANT: CPT | Performed by: EMERGENCY MEDICINE

## 2021-03-28 RX ORDER — DEXAMETHASONE SODIUM PHOSPHATE 10 MG/ML
10 INJECTION, SOLUTION INTRAMUSCULAR; INTRAVENOUS ONCE
Status: COMPLETED | OUTPATIENT
Start: 2021-03-28 | End: 2021-03-28

## 2021-03-28 RX ORDER — FLUTICASONE PROPIONATE 50 MCG
1 SPRAY, SUSPENSION (ML) NASAL ONCE
Status: COMPLETED | OUTPATIENT
Start: 2021-03-28 | End: 2021-03-28

## 2021-03-28 RX ORDER — PENICILLIN V POTASSIUM 500 MG/1
500 TABLET ORAL EVERY 12 HOURS
Qty: 20 TABLET | Refills: 0 | Status: SHIPPED | OUTPATIENT
Start: 2021-03-28 | End: 2021-04-07

## 2021-03-28 RX ORDER — KETOROLAC TROMETHAMINE 30 MG/ML
15 INJECTION, SOLUTION INTRAMUSCULAR; INTRAVENOUS ONCE
Status: COMPLETED | OUTPATIENT
Start: 2021-03-28 | End: 2021-03-28

## 2021-03-28 RX ORDER — PENICILLIN V POTASSIUM 250 MG/1
500 TABLET ORAL ONCE
Status: COMPLETED | OUTPATIENT
Start: 2021-03-28 | End: 2021-03-28

## 2021-03-28 RX ORDER — ACETAMINOPHEN 325 MG/1
975 TABLET ORAL ONCE
Status: COMPLETED | OUTPATIENT
Start: 2021-03-28 | End: 2021-03-28

## 2021-03-28 RX ADMIN — DEXAMETHASONE SODIUM PHOSPHATE 10 MG: 10 INJECTION, SOLUTION INTRAMUSCULAR; INTRAVENOUS at 19:50

## 2021-03-28 RX ADMIN — ACETAMINOPHEN 975 MG: 325 TABLET, FILM COATED ORAL at 19:48

## 2021-03-28 RX ADMIN — PENICILLIN V POTASSIUM 500 MG: 250 TABLET, FILM COATED ORAL at 23:08

## 2021-03-28 RX ADMIN — FLUTICASONE PROPIONATE 1 SPRAY: 50 SPRAY, METERED NASAL at 19:55

## 2021-03-28 RX ADMIN — KETOROLAC TROMETHAMINE 15 MG: 30 INJECTION, SOLUTION INTRAMUSCULAR at 19:50

## 2021-03-28 RX ADMIN — SODIUM CHLORIDE 1000 ML: 0.9 INJECTION, SOLUTION INTRAVENOUS at 19:50

## 2021-03-28 RX ADMIN — SODIUM CHLORIDE 1000 ML: 0.9 INJECTION, SOLUTION INTRAVENOUS at 20:36

## 2021-03-28 RX ADMIN — IOHEXOL 100 ML: 350 INJECTION, SOLUTION INTRAVENOUS at 21:03

## 2021-03-28 NOTE — Clinical Note
Greg Giovannydanielito was seen and treated in our emergency department on 3/28/2021  Diagnosis: laura Trujillo  may return to work on return date  She may return on this date: 04/01/2021         If you have any questions or concerns, please don't hesitate to call        Betty Araujo MD    ______________________________           _______________          _______________  [facility]                              Date                                Time

## 2021-03-28 NOTE — ED PROVIDER NOTES
History  Chief Complaint   Patient presents with    Flu Symptoms     Pt presents to ER from home for reports of body aches, chills, headache, nasal congestion  Denies cough  HPI    22 yo F otherwise healthy presenting for multiple symptoms  Patient states her symptoms started suddenly this morning about 8 hours ago  Body aches lightheadedness, nasal congestion sore throat,nausea  Generalized abdominal pain  No cough, but patient has palpitations, she has shortness of breath  Diaphoresis  No sick contacts, but patient works at [employer]  Patient otherwise feel generalized malaise  No other complaints on ROS  LmP: march 15-  Prior to Admission Medications   Prescriptions Last Dose Informant Patient Reported? Taking? LORazepam (ATIVAN) 2 mg/mL concentrated solution   No No   Sig: Take 0 25 mL (0 5 mg total) by mouth 2 (two) times a day as needed for anxiety for up to 10 days   acetaminophen (TYLENOL) 500 mg tablet   No No   Sig: Take 1 tablet (500 mg total) by mouth every 6 (six) hours as needed for mild pain Crush and take with water   chlorhexidine (PERIDEX) 0 12 % solution   No No   Sig: Apply 30 mL to the mouth or throat 5 (five) times a day   omeprazole (PriLOSEC) 10 mg delayed release capsule   No No   Sig: Take 1 capsule (10 mg total) by mouth daily for 14 days Crush and take with water   sodium chloride (OCEAN) 0 65 % nasal spray   No No   Si spray into each nostril 4 (four) times a day      Facility-Administered Medications: None       History reviewed  No pertinent past medical history      Past Surgical History:   Procedure Laterality Date    MAXILLARY LE FORTE OSTEOTOMY Bilateral 2019    Procedure: GENIOPLASTY, LEFORTE I, BILATERAL SAGITAL SPLIT RAMUS OSTEOTOMY, SEPTOPLASTY, INFERIOR TURBINATE REDUCTION;  Surgeon: Nick Beckham DMD;  Location: [facility];  Service: Maxillofacial    MS BRONCHOSCOPY,DIAGNOSTIC N/A 2019    Procedure: BRONCHOSCOPY FLEXIBLE, CONTROLLED INTUBATION;  Surgeon: Denton Dahl DO;  Location: [facility];  Service: General    WISDOM TOOTH EXTRACTION         History reviewed  No pertinent family history  I have reviewed and agree with the history as documented  E-Cigarette/Vaping     E-Cigarette/Vaping Substances     Social History     Tobacco Use    Smoking status: Never Smoker    Smokeless tobacco: Never Used   Substance Use Topics    Alcohol use: Never     Frequency: Never    Drug use: Never       Review of Systems   Constitutional: Positive for chills, diaphoresis and fatigue  Negative for fever  HENT: Positive for sore throat  Eyes: Negative for redness and visual disturbance  Respiratory: Positive for shortness of breath  Negative for cough  Cardiovascular: Positive for palpitations  Negative for chest pain  Gastrointestinal: Positive for abdominal pain and nausea  Negative for diarrhea  Genitourinary: Positive for urgency  Negative for difficulty urinating, dysuria and pelvic pain  Musculoskeletal: Negative for back pain  Skin: Negative for rash  Neurological: Positive for light-headedness and headaches  Negative for syncope and weakness  All other systems reviewed and are negative  Physical Exam  Physical Exam  Vitals signs and nursing note reviewed  Constitutional:       General: She is not in acute distress  Appearance: She is ill-appearing and diaphoretic  HENT:      Head: Normocephalic and atraumatic  Mouth/Throat:      Pharynx: Oropharyngeal exudate and posterior oropharyngeal erythema present  Comments: Uvula midline no pta seen, no pain with tracheal manipulation  Eyes:      Conjunctiva/sclera: Conjunctivae normal    Neck:      Musculoskeletal: Normal range of motion  Cardiovascular:      Rate and Rhythm: Regular rhythm  Tachycardia present  Heart sounds: Normal heart sounds        Comments: HR 120s initially  Pulmonary:      Effort: Pulmonary effort is normal  No respiratory distress  Breath sounds: Normal breath sounds  Abdominal:      General: Bowel sounds are normal       Palpations: Abdomen is soft  Tenderness: There is abdominal tenderness  Comments: Generalized discomfort on palpation   Musculoskeletal: Normal range of motion  Skin:     General: Skin is warm  Findings: No rash  Neurological:      Mental Status: She is alert and oriented to person, place, and time  Cranial Nerves: No cranial nerve deficit  Sensory: No sensory deficit  Motor: No abnormal muscle tone        Coordination: Coordination normal          Vital Signs  ED Triage Vitals   Temperature Pulse Respirations Blood Pressure SpO2   03/28/21 1832 03/28/21 1832 03/28/21 1832 03/28/21 1832 03/28/21 1832   99 3 °F (37 4 °C) (!) 146 18 127/78 100 %      Temp Source Heart Rate Source Patient Position - Orthostatic VS BP Location FiO2 (%)   03/28/21 1832 03/28/21 1832 03/28/21 1832 03/28/21 1832 --   Temporal Monitor Sitting Left arm       Pain Score       03/28/21 1948       Med Not Given for Pain - for MAR use only           Vitals:    03/28/21 2045 03/28/21 2130 03/28/21 2200 03/28/21 2310   BP: 111/64 109/61 109/65 111/65   Pulse: 104 91 86 90   Patient Position - Orthostatic VS: Lying Lying Lying Lying         Visual Acuity  Visual Acuity      Most Recent Value   L Pupil Size (mm)  3   R Pupil Size (mm)  3          ED Medications  Medications   sodium chloride 0 9 % bolus 1,000 mL (0 mL Intravenous Stopped 3/28/21 2108)   ketorolac (TORADOL) injection 15 mg (15 mg Intravenous Given 3/28/21 1950)   acetaminophen (TYLENOL) tablet 975 mg (975 mg Oral Given 3/28/21 1948)   fluticasone (FLONASE) 50 mcg/act nasal spray 1 spray (1 spray Each Nare Given 3/28/21 1955)   dexamethasone (PF) (DECADRON) injection 10 mg (10 mg Intravenous Given 3/28/21 1950)   sodium chloride 0 9 % bolus 1,000 mL (0 mL Intravenous Stopped 3/28/21 2215)   iohexol (OMNIPAQUE) 350 MG/ML injection (MULTI-DOSE) 100 mL (100 mL Intravenous Given 3/28/21 2103)   penicillin V potassium (VEETID) tablet 500 mg (500 mg Oral Given 3/28/21 2308)       Diagnostic Studies  Results Reviewed     Procedure Component Value Units Date/Time    Mononucleosis screen [[id number]]  (Normal) Collected: 03/28/21 2213    Lab Status: Final result Specimen: Blood from Arm, Right Updated: 03/29/21 1200     Monotest Negative    Procalcitonin with AM Reflex [[id number]]  (Normal) Collected: 03/28/21 2020    Lab Status: Final result Specimen: Blood from Arm, Right Updated: 03/29/21 0238     Procalcitonin 0 11 ng/ml     Procalcitonin Reflex [[id number]]     Lab Status: No result Specimen: Blood     Blood culture #1 [[id number]] Collected: 03/28/21 2020    Lab Status: Preliminary result Specimen: Blood from Arm, Right Updated: 03/29/21 0102     Blood Culture Received in Microbiology Lab  Culture in Progress  Blood culture #2 [[id number]] Collected: 03/28/21 2020    Lab Status: Preliminary result Specimen: Blood from Arm, Right Updated: 03/29/21 0102     Blood Culture Received in Microbiology Lab  Culture in Progress  Strep A PCR [[id number]]  (Abnormal) Collected: 03/28/21 2213    Lab Status: Final result Specimen: Throat Updated: 03/28/21 2258     STREP A PCR Detected    Urine Microscopic [[id number]]  (Normal) Collected: 03/28/21 2032    Lab Status: Final result Specimen: Urine, Clean Catch Updated: 03/28/21 2059     RBC, UA 2-4 /hpf      WBC, UA 1-2 /hpf      Epithelial Cells Occasional /hpf      Bacteria, UA Occasional /hpf     COVID19, Influenza A/B, RSV PCR, [facility] [[id number]]  (Normal) Collected: 03/28/21 1950    Lab Status: Final result Specimen: Nasopharyngeal Swab Updated: 03/28/21 2050     SARS-CoV-2 Negative     INFLUENZA A PCR Negative     INFLUENZA B PCR Negative     RSV PCR Negative    Narrative: This test has been authorized by FDA under an EUA (Emergency Use Assay) for use by authorized laboratories    Clinical caution and judgement should be used with the interpretation of these results with consideration of the clinical impression and other laboratory testing  Testing reported as "Positive" or "Negative" has been proven to be accurate according to standard laboratory validation requirements  All testing is performed with control materials showing appropriate reactivity at standard intervals  Lactic acid [[id number]]  (Normal) Collected: 03/28/21 2020    Lab Status: Final result Specimen: Blood from Arm, Right Updated: 03/28/21 2048     LACTIC ACID 1 1 mmol/L     Narrative:      Result may be elevated if tourniquet was used during collection      UA (URINE) with reflex to Scope [[id number]]  (Abnormal) Collected: 03/28/21 2032    Lab Status: Final result Specimen: Urine, Clean Catch Updated: 03/28/21 2041     Color, UA Yellow     Clarity, UA Clear     Specific Gravity, UA 1 010     pH, UA 7 0     Leukocytes, UA Negative     Nitrite, UA Negative     Protein, UA Negative mg/dl      Glucose, UA Negative mg/dl      Ketones, UA Negative mg/dl      Urobilinogen, UA 0 2 E U /dl      Bilirubin, UA Negative     Blood, UA Trace-Intact    POCT pregnancy, urine [[id number]]  (Normal) Resulted: 03/28/21 2033    Lab Status: Final result Updated: 03/28/21 2033     EXT PREG TEST UR (Ref: Negative) neg     Control valid    D-Dimer [[id number]]  (Normal) Collected: 03/28/21 1950    Lab Status: Final result Specimen: Blood from Arm, Right Updated: 03/28/21 2026     D-Dimer, Quant 0 42 ug/ml FEU     Troponin I [[id number]]  (Normal) Collected: 03/28/21 1950    Lab Status: Final result Specimen: Blood from Arm, Right Updated: 03/28/21 2019     Troponin I <0 02 ng/mL     Comprehensive metabolic panel [[id number]] Collected: 03/28/21 1950    Lab Status: Final result Specimen: Blood from Arm, Right Updated: 03/28/21 2017     Sodium 137 mmol/L      Potassium 3 7 mmol/L      Chloride 100 mmol/L      CO2 24 mmol/L      ANION GAP 13 mmol/L      BUN 17 mg/dL      Creatinine 0 99 mg/dL      Glucose 111 mg/dL      Calcium 9 2 mg/dL      AST 16 U/L      ALT 16 U/L      Alkaline Phosphatase 75 U/L      Total Protein 7 6 g/dL      Albumin 3 9 g/dL      Total Bilirubin 0 70 mg/dL      eGFR 82 ml/min/1 73sq m     Narrative:      [place] guidelines for Chronic Kidney Disease (CKD):     Stage 1 with normal or high GFR (GFR > 90 mL/min/1 73 square meters)    Stage 2 Mild CKD (GFR = 60-89 mL/min/1 73 square meters)    Stage 3A Moderate CKD (GFR = 45-59 mL/min/1 73 square meters)    Stage 3B Moderate CKD (GFR = 30-44 mL/min/1 73 square meters)    Stage 4 Severe CKD (GFR = 15-29 mL/min/1 73 square meters)    Stage 5 End Stage CKD (GFR <15 mL/min/1 73 square meters)  Note: GFR calculation is accurate only with a steady state creatinine    Lipase [[id number]]  (Normal) Collected: 03/28/21 1950    Lab Status: Final result Specimen: Blood from Arm, Right Updated: 03/28/21 2012     Lipase 100 u/L     CBC and differential [[id number]]  (Abnormal) Collected: 03/28/21 1950    Lab Status: Final result Specimen: Blood from Arm, Right Updated: 03/28/21 1959     WBC 21 13 Thousand/uL      RBC 4 78 Million/uL      Hemoglobin 14 2 g/dL      Hematocrit 42 6 %      MCV 89 fL      MCH 29 7 pg      MCHC 33 3 g/dL      RDW 12 7 %      MPV 10 5 fL      Platelets 529 Thousands/uL      nRBC 0 /100 WBCs      Neutrophils Relative 83 %      Immat GRANS % 1 %      Lymphocytes Relative 5 %      Monocytes Relative 11 %      Eosinophils Relative 0 %      Basophils Relative 0 %      Neutrophils Absolute 17 63 Thousands/µL      Immature Grans Absolute 0 10 Thousand/uL      Lymphocytes Absolute 1 03 Thousands/µL      Monocytes Absolute 2 30 Thousand/µL      Eosinophils Absolute 0 00 Thousand/µL      Basophils Absolute 0 07 Thousands/µL                  PE Study with CT abdomen & pelvis with contrast   Final Result by Nathalie Forman MD (03/28 2156)      1  No evidence of pulmonary embolism     2   No focal consolidation of the lungs  3   Mild diffuse thickening of esophagus, correlate for esophagitis  4   No acute inflammatory process identified within the abdomen or pelvis  Workstation performed: [id number]         XR chest 1 view portable   Final Result by Jarek Harris DO (03/29 1002)      No acute cardiopulmonary disease  Workstation performed: [id number]                    Procedures  Procedures         ED Course  ED Course as of Mar 29 1431   Manjinder Vazquez Mar 28, 2021   2002 WBC(!): 21 13   2002 Temperature(!): 101 9 °F (38 8 °C)   2009 EKG: sinus tach, t wave inversion III, biphasic t wave in aVF      2043 PREGNANCY TEST URINE: neg   2044 Creatinine: 0 99   2127 Patient sitting up stating she feels well, no complaints, sore throat has been relieved  Texting on phone, well appearing at this time  Pending ct scan results      2259 STREP A PCR(!): Detected       PERC Rule for PE      Most Recent Value   PERC Rule for PE   Age >=50  0 Filed at: 03/29/2021 1430   HR >=100  1 Filed at: 03/29/2021 1430   O2 Sat on room air < 95%  0 Filed at: 03/29/2021 1430   History of PE or DVT  0 Filed at: 03/29/2021 1430   Recent trauma or surgery  0 Filed at: 03/29/2021 1430   Hemoptysis  0 Filed at: 03/29/2021 1430   Exogenous estrogen  0 Filed at: 03/29/2021 1430   Unilateral leg swelling  0 Filed at: 03/29/2021 1430   PERC Rule for PE Results  1 Filed at: 03/29/2021 1430          Initial Sepsis Screening     Row Name 03/28/21 2003                Is the patient's history suggestive of a new or worsening infection?  --        Suspected source of infection  suspect infection, source unknown  -AW        Are two or more of the following signs & symptoms of infection both present and new to the patient? (!) Yes (Proceed)  -AW        Indicate SIRS criteria  Hyperthemia > 38 3C (100 9F); Leukocytosis (WBC > 30635 IJL)  -AW        If the answer is yes to both questions, suspicion of sepsis is present  -- If severe sepsis is present AND tissue hypoperfusion perists in the hour after fluid resuscitation or lactate > 4, the patient meets criteria for SEPTIC SHOCK  --        Are any of the following organ dysfunction criteria present within 6 hours of suspected infection and SIRS criteria that are NOT considered to be chronic conditions? No  -AW        Organ dysfunction  --        Date of presentation of severe sepsis  --        Time of presentation of severe sepsis  --        Tissue hypoperfusion persists in the hour after crystalloid fluid administration, evidenced, by either:  --        Was hypotension present within one hour of the conclusion of crystalloid fluid administration?  --        Date of presentation of septic shock  --        Time of presentation of septic shock  --          User Key  (r) = Recorded By, (t) = Taken By, (c) = Cosigned By    [address] Name Provider Monica Alvarado MD Physician                [facility]     22 yo F presenting for flu like symptoms  Tachypnea here, with tachycardia, d dimer wells moderate risk  Septic work up initiated  Patient ill appearing, tender abd will also scan abd looking for acute pathology  Steroids and analgesia provided for throat  Patient's imaging negative for acute pathology  Has leukocytosis, strep positive  Treat with penicillin  Patient had resolution of symptoms after fluids analgesia and steroids  States she feels well  Will be discharged with pcp follow up  The patient was instructed to follow up as documented  Strict return precautions were discussed with the patient and the patient was instructed to return to the emergency department immediately if symptoms worsen  The patient/patient family member acknowledged and were in agreement with plan         Disposition  Final diagnoses:   Strep pharyngitis     Time reflects when diagnosis was documented in both MDM as applicable and the Disposition within this note     Time User Action Codes Description Comment    3/28/2021 11:02 PM Nia Avila Add [J02 0] Strep pharyngitis       ED Disposition     ED Disposition Condition Date/Time Comment    Discharge Stable Sun Mar 28, 2021 11:02 PM Nicole Juarez discharge to home/self care              Follow-up Information     Follow up With Specialties Details Why Contact Info Additional Information    Tom Pruitt MD Family Medicine Schedule an appointment as soon as possible for a visit in 3 days For follow up regarding your symptoms and recheck [address] [facility] [phone]  [facility] Th[facility]olaryngology Schedule an appointment as soon as possible for a visit in 1 week For follow up regarding your symptoms and recheck, If symptoms worsen [address] [facility] 5 [facility], [address] [place] [address], [address], [id number] [place] [address] [address] [place], [address] [place]          Discharge Medication List as of 3/28/2021 11:05 PM      START taking these medications    Details   penicillin V potassium (VEETID) 500 mg tablet Take 1 tablet (500 mg total) by mouth every 12 (twelve) hours for 10 days, Starting Sun 3/28/2021, Until Wed 4/7/2021, Print         CONTINUE these medications which have NOT CHANGED    Details   acetaminophen (TYLENOL) 500 mg tablet Take 1 tablet (500 mg total) by mouth every 6 (six) hours as needed for mild pain Crush and take with water, Starting Mon 7/8/2019, Print      chlorhexidine (PERIDEX) 0 12 % solution Apply 30 mL to the mouth or throat 5 (five) times a day, Starting Mon 7/8/2019, Print      LORazepam (ATIVAN) 2 mg/mL concentrated solution Take 0 25 mL (0 5 mg total) by mouth 2 (two) times a day as needed for anxiety for up to 10 days, Starting Mon 7/8/2019, Until u 7/18/2019, Print      omeprazole (PriLOSEC) 10 mg delayed release capsule Take 1 capsule (10 mg total) by mouth daily for 14 days Crush and take with water, Starting Mon 7/8/2019, Until Mon 7/22/2019, Print      sodium chloride (OCEAN) 0 65 % nasal spray 1 spray into each nostril 4 (four) times a day, Starting Mon 7/8/2019, Print           No discharge procedures on file      PDMP Review     None          ED Provider  Electronically Signed by           Quita Dandy, MD  03/29/21 8580

## 2021-03-29 LAB
HETEROPH AB SER QL: NEGATIVE
PROCALCITONIN SERPL-MCNC: 0.11 NG/ML

## 2021-03-29 NOTE — SEPSIS NOTE
Sepsis Note   Federico Moses 23 y o  female MRN: [id number]  Unit/Bed#: ED 12 Encounter: [id number]      qSOFA     Row Name 03/28/21 1832                Altered mental status GCS < 15  --        Respiratory Rate > / =16  0        Systolic BP < / =391  0        Q Sofa Score  0            Initial Sepsis Screening     Row Name 03/28/21 2003                Is the patient's history suggestive of a new or worsening infection?  --        Suspected source of infection  suspect infection, source unknown  -AW        Are two or more of the following signs & symptoms of infection both present and new to the patient? (!) Yes (Proceed)  -AW        Indicate SIRS criteria  Hyperthemia > 38 3C (100 9F); Leukocytosis (WBC > 44830 IJL)  -AW        If the answer is yes to both questions, suspicion of sepsis is present  --        If severe sepsis is present AND tissue hypoperfusion perists in the hour after fluid resuscitation or lactate > 4, the patient meets criteria for SEPTIC SHOCK  --        Are any of the following organ dysfunction criteria present within 6 hours of suspected infection and SIRS criteria that are NOT considered to be chronic conditions?   No  -AW        Organ dysfunction  --        Date of presentation of severe sepsis  --        Time of presentation of severe sepsis  --        Tissue hypoperfusion persists in the hour after crystalloid fluid administration, evidenced, by either:  --        Was hypotension present within one hour of the conclusion of crystalloid fluid administration?  --        Date of presentation of septic shock  --        Time of presentation of septic shock  --          User Key  (r) = Recorded By, (t) = Taken By, (c) = Cosigned By    [address] Name Provider Tamir Ramey MD Physician

## 2021-03-30 LAB
ATRIAL RATE: 114 BPM
P AXIS: 81 DEGREES
PR INTERVAL: 160 MS
QRS AXIS: 98 DEGREES
QRSD INTERVAL: 88 MS
QT INTERVAL: 320 MS
QTC INTERVAL: 441 MS
T WAVE AXIS: 14 DEGREES
VENTRICULAR RATE: 114 BPM

## 2021-03-30 PROCEDURE — 93010 ELECTROCARDIOGRAM REPORT: CPT | Performed by: INTERNAL MEDICINE

## 2021-04-03 LAB
BACTERIA BLD CULT: NORMAL
BACTERIA BLD CULT: NORMAL

## 2022-02-13 ENCOUNTER — APPOINTMENT (EMERGENCY)
Dept: CT IMAGING | Facility: HOSPITAL | Age: 22
End: 2022-02-13
Payer: COMMERCIAL

## 2022-02-13 ENCOUNTER — APPOINTMENT (EMERGENCY)
Dept: RADIOLOGY | Facility: HOSPITAL | Age: 22
End: 2022-02-13
Payer: COMMERCIAL

## 2022-02-13 ENCOUNTER — HOSPITAL ENCOUNTER (EMERGENCY)
Facility: HOSPITAL | Age: 22
Discharge: HOME | End: 2022-02-13
Attending: EMERGENCY MEDICINE
Payer: COMMERCIAL

## 2022-02-13 VITALS
HEART RATE: 97 BPM | DIASTOLIC BLOOD PRESSURE: 68 MMHG | RESPIRATION RATE: 21 BRPM | OXYGEN SATURATION: 99 % | HEIGHT: 64 IN | SYSTOLIC BLOOD PRESSURE: 115 MMHG | TEMPERATURE: 98.6 F | BODY MASS INDEX: 21.34 KG/M2 | WEIGHT: 125 LBS

## 2022-02-13 DIAGNOSIS — K52.9 GASTROENTERITIS: Primary | ICD-10-CM

## 2022-02-13 DIAGNOSIS — Z00.8 ENCOUNTER FOR PSYCHOLOGICAL EVALUATION: ICD-10-CM

## 2022-02-13 LAB
ALBUMIN SERPL BCP-MCNC: 3.9 G/DL (ref 3.5–5)
ALP SERPL-CCNC: 65 U/L (ref 46–116)
ALT SERPL W P-5'-P-CCNC: 15 U/L (ref 12–78)
ANION GAP SERPL CALCULATED.3IONS-SCNC: 10 MMOL/L (ref 4–13)
AST SERPL W P-5'-P-CCNC: 21 U/L (ref 5–45)
ATRIAL RATE: 115 BPM
BASOPHILS # BLD AUTO: 0.02 THOUSANDS/ΜL (ref 0–0.1)
BASOPHILS NFR BLD AUTO: 0 % (ref 0–1)
BILIRUB SERPL-MCNC: 0.78 MG/DL (ref 0.2–1)
BUN SERPL-MCNC: 20 MG/DL (ref 5–25)
CALCIUM SERPL-MCNC: 8.8 MG/DL (ref 8.3–10.1)
CARDIAC TROPONIN I PNL SERPL HS: <2 NG/L
CHLORIDE SERPL-SCNC: 103 MMOL/L (ref 100–108)
CO2 SERPL-SCNC: 27 MMOL/L (ref 21–32)
CREAT SERPL-MCNC: 0.8 MG/DL (ref 0.6–1.3)
EOSINOPHIL # BLD AUTO: 0.01 THOUSAND/ΜL (ref 0–0.61)
EOSINOPHIL NFR BLD AUTO: 0 % (ref 0–6)
ERYTHROCYTE [DISTWIDTH] IN BLOOD BY AUTOMATED COUNT: 13 % (ref 11.6–15.1)
FLUAV RNA RESP QL NAA+PROBE: NEGATIVE
FLUBV RNA RESP QL NAA+PROBE: NEGATIVE
GFR SERPL CREATININE-BSD FRML MDRD: 104 ML/MIN/1.73SQ M
GLUCOSE SERPL-MCNC: 99 MG/DL (ref 65–140)
HCG SERPL QL: NEGATIVE
HCT VFR BLD AUTO: 45.4 % (ref 34.8–46.1)
HGB BLD-MCNC: 14.8 G/DL (ref 11.5–15.4)
IMM GRANULOCYTES # BLD AUTO: 0.02 THOUSAND/UL (ref 0–0.2)
IMM GRANULOCYTES NFR BLD AUTO: 0 % (ref 0–2)
LIPASE SERPL-CCNC: 75 U/L (ref 73–393)
LYMPHOCYTES # BLD AUTO: 0.58 THOUSANDS/ΜL (ref 0.6–4.47)
LYMPHOCYTES NFR BLD AUTO: 5 % (ref 14–44)
MAGNESIUM SERPL-MCNC: 2 MG/DL (ref 1.6–2.6)
MCH RBC QN AUTO: 29.8 PG (ref 26.8–34.3)
MCHC RBC AUTO-ENTMCNC: 32.6 G/DL (ref 31.4–37.4)
MCV RBC AUTO: 91 FL (ref 82–98)
MONOCYTES # BLD AUTO: 0.78 THOUSAND/ΜL (ref 0.17–1.22)
MONOCYTES NFR BLD AUTO: 7 % (ref 4–12)
NEUTROPHILS # BLD AUTO: 10.37 THOUSANDS/ΜL (ref 1.85–7.62)
NEUTS SEG NFR BLD AUTO: 88 % (ref 43–75)
NRBC BLD AUTO-RTO: 0 /100 WBCS
P AXIS: 80 DEGREES
PLATELET # BLD AUTO: 284 THOUSANDS/UL (ref 149–390)
PMV BLD AUTO: 10.4 FL (ref 8.9–12.7)
POTASSIUM SERPL-SCNC: 4.6 MMOL/L (ref 3.5–5.3)
PR INTERVAL: 144 MS
PROT SERPL-MCNC: 7.6 G/DL (ref 6.4–8.2)
QRS AXIS: 100 DEGREES
QRSD INTERVAL: 84 MS
QT INTERVAL: 318 MS
QTC INTERVAL: 439 MS
RBC # BLD AUTO: 4.97 MILLION/UL (ref 3.81–5.12)
RSV RNA RESP QL NAA+PROBE: NEGATIVE
SARS-COV-2 RNA RESP QL NAA+PROBE: NEGATIVE
SODIUM SERPL-SCNC: 140 MMOL/L (ref 136–145)
T WAVE AXIS: -25 DEGREES
VENTRICULAR RATE: 115 BPM
WBC # BLD AUTO: 11.78 THOUSAND/UL (ref 4.31–10.16)

## 2022-02-13 PROCEDURE — 80053 COMPREHEN METABOLIC PANEL: CPT | Performed by: EMERGENCY MEDICINE

## 2022-02-13 PROCEDURE — 71046 X-RAY EXAM CHEST 2 VIEWS: CPT

## 2022-02-13 PROCEDURE — 74177 CT ABD & PELVIS W/CONTRAST: CPT

## 2022-02-13 PROCEDURE — 36415 COLL VENOUS BLD VENIPUNCTURE: CPT | Performed by: EMERGENCY MEDICINE

## 2022-02-13 PROCEDURE — 96361 HYDRATE IV INFUSION ADD-ON: CPT

## 2022-02-13 PROCEDURE — 93010 ELECTROCARDIOGRAM REPORT: CPT | Performed by: INTERNAL MEDICINE

## 2022-02-13 PROCEDURE — 99285 EMERGENCY DEPT VISIT HI MDM: CPT | Performed by: EMERGENCY MEDICINE

## 2022-02-13 PROCEDURE — 83735 ASSAY OF MAGNESIUM: CPT | Performed by: EMERGENCY MEDICINE

## 2022-02-13 PROCEDURE — 84703 CHORIONIC GONADOTROPIN ASSAY: CPT | Performed by: EMERGENCY MEDICINE

## 2022-02-13 PROCEDURE — 99285 EMERGENCY DEPT VISIT HI MDM: CPT

## 2022-02-13 PROCEDURE — 84484 ASSAY OF TROPONIN QUANT: CPT | Performed by: EMERGENCY MEDICINE

## 2022-02-13 PROCEDURE — 96374 THER/PROPH/DIAG INJ IV PUSH: CPT

## 2022-02-13 PROCEDURE — 0241U HB NFCT DS VIR RESP RNA 4 TRGT: CPT | Performed by: EMERGENCY MEDICINE

## 2022-02-13 PROCEDURE — 83690 ASSAY OF LIPASE: CPT | Performed by: EMERGENCY MEDICINE

## 2022-02-13 PROCEDURE — 93005 ELECTROCARDIOGRAM TRACING: CPT

## 2022-02-13 PROCEDURE — 96375 TX/PRO/DX INJ NEW DRUG ADDON: CPT

## 2022-02-13 PROCEDURE — 85025 COMPLETE CBC W/AUTO DIFF WBC: CPT | Performed by: EMERGENCY MEDICINE

## 2022-02-13 RX ORDER — ONDANSETRON 2 MG/ML
4 INJECTION INTRAMUSCULAR; INTRAVENOUS ONCE
Status: COMPLETED | OUTPATIENT
Start: 2022-02-13 | End: 2022-02-13

## 2022-02-13 RX ORDER — MORPHINE SULFATE 4 MG/ML
4 INJECTION, SOLUTION INTRAMUSCULAR; INTRAVENOUS ONCE
Status: COMPLETED | OUTPATIENT
Start: 2022-02-13 | End: 2022-02-13

## 2022-02-13 RX ORDER — ONDANSETRON 4 MG/1
4 TABLET, ORALLY DISINTEGRATING ORAL EVERY 6 HOURS PRN
Qty: 20 TABLET | Refills: 0 | Status: SHIPPED | OUTPATIENT
Start: 2022-02-13

## 2022-02-13 RX ORDER — DICYCLOMINE HCL 20 MG
20 TABLET ORAL ONCE
Status: COMPLETED | OUTPATIENT
Start: 2022-02-13 | End: 2022-02-13

## 2022-02-13 RX ORDER — KETOROLAC TROMETHAMINE 30 MG/ML
15 INJECTION, SOLUTION INTRAMUSCULAR; INTRAVENOUS ONCE
Status: COMPLETED | OUTPATIENT
Start: 2022-02-13 | End: 2022-02-13

## 2022-02-13 RX ORDER — DICYCLOMINE HCL 20 MG
20 TABLET ORAL 2 TIMES DAILY
Qty: 20 TABLET | Refills: 0 | Status: SHIPPED | OUTPATIENT
Start: 2022-02-13

## 2022-02-13 RX ADMIN — KETOROLAC TROMETHAMINE 15 MG: 30 INJECTION, SOLUTION INTRAMUSCULAR at 13:42

## 2022-02-13 RX ADMIN — ONDANSETRON 4 MG: 2 INJECTION INTRAMUSCULAR; INTRAVENOUS at 13:41

## 2022-02-13 RX ADMIN — DICYCLOMINE HYDROCHLORIDE 20 MG: 20 TABLET ORAL at 15:25

## 2022-02-13 RX ADMIN — IOHEXOL 100 ML: 350 INJECTION, SOLUTION INTRAVENOUS at 14:24

## 2022-02-13 RX ADMIN — SODIUM CHLORIDE 1000 ML: 0.9 INJECTION, SOLUTION INTRAVENOUS at 13:39

## 2022-02-13 RX ADMIN — MORPHINE SULFATE 4 MG: 4 INJECTION INTRAVENOUS at 13:44

## 2022-02-13 NOTE — ED PROVIDER NOTES
History  Chief Complaint   Patient presents with    Psychiatric Evaluation     Pt has had suicidal thoughts for a long time  She states she has thought about it in the last couple of months  Denies specific plan at this time   Abdominal Pain     Pt state her stomach hurts and she has been throwing up since yesterday  She states she has a headache  She has covid vax but has some exposures at work     Patient is a 25-year-old female no past medical history presenting with abdominal pain and vomiting, SI  Patient states that since yesterday she has had constant nonradiating generalized abdominal pain which is throbbing/cramping in nature and states it is worse with lying on the left side  Has not taken any medication for it states she has had before intermittently since having jaw surgery several years ago  Has never been given a diagnosis  Patient notes body aches in the same timeframe and notes mild central chest pain prior to arrival and mild shortness of breath  Notes too numerous to count episodes of bilious nonbloody vomit since yesterday but denies any diarrhea constipation, urinary symptoms, vaginal discharge, vaginal bleeding  Notes intermittent lightheadedness, denies rashes, vision changes, fevers  Also states that for the past couple months she has had intermittent suicidal ideation but does not eyes any plan  Denies any prior psychiatric hospitalizations and does not take any psychiatric medicines  Denies any homicidal ideation or hallucinations  Prior to Admission Medications   Prescriptions Last Dose Informant Patient Reported? Taking?    LORazepam (ATIVAN) 2 mg/mL concentrated solution   No No   Sig: Take 0 25 mL (0 5 mg total) by mouth 2 (two) times a day as needed for anxiety for up to 10 days   acetaminophen (TYLENOL) 500 mg tablet   No No   Sig: Take 1 tablet (500 mg total) by mouth every 6 (six) hours as needed for mild pain Crush and take with water   chlorhexidine (PERIDEX) 0 12 % solution   No No   Sig: Apply 30 mL to the mouth or throat 5 (five) times a day   omeprazole (PriLOSEC) 10 mg delayed release capsule   No No   Sig: Take 1 capsule (10 mg total) by mouth daily for 14 days Crush and take with water   sodium chloride (OCEAN) 0 65 % nasal spray   No No   Si spray into each nostril 4 (four) times a day      Facility-Administered Medications: None       History reviewed  No pertinent past medical history  Past Surgical History:   Procedure Laterality Date    MAXILLARY LE FORTE OSTEOTOMY Bilateral 2019    Procedure: GENIOPLASTY, LEFORTE I, BILATERAL SAGITAL SPLIT RAMUS OSTEOTOMY, SEPTOPLASTY, INFERIOR TURBINATE REDUCTION;  Surgeon: Yamil Reyes DMD;  Location: [facility];  Service: Maxillofacial    OR BRONCHOSCOPY,DIAGNOSTIC N/A 2019    Procedure: BRONCHOSCOPY FLEXIBLE, CONTROLLED INTUBATION;  Surgeon: Christi Villanueva DO;  Location: [facility];  Service: General    WISDOM TOOTH EXTRACTION         History reviewed  No pertinent family history  I have reviewed and agree with the history as documented  E-Cigarette/Vaping     E-Cigarette/Vaping Substances     Social History     Tobacco Use    Smoking status: Never Smoker    Smokeless tobacco: Never Used   Substance Use Topics    Alcohol use: Never    Drug use: Never       Review of Systems   All other systems reviewed and are negative  Physical Exam  Physical Exam  Vitals reviewed  Constitutional:       General: She is not in acute distress  Appearance: Normal appearance  She is not ill-appearing  HENT:      Mouth/Throat:      Mouth: Mucous membranes are moist    Eyes:      Extraocular Movements: Extraocular movements intact  Conjunctiva/sclera: Conjunctivae normal       Comments: Normal conjunctiva   Cardiovascular:      Rate and Rhythm: Regular rhythm  Tachycardia present  Heart sounds: Normal heart sounds     Pulmonary:      Effort: Pulmonary effort is normal  Breath sounds: Normal breath sounds  Abdominal:      General: Abdomen is flat  Palpations: Abdomen is soft  Tenderness: There is generalized abdominal tenderness  There is no right CVA tenderness, left CVA tenderness or guarding  Musculoskeletal:         General: No swelling  Normal range of motion  Cervical back: Neck supple  Skin:     General: Skin is warm and dry  Neurological:      General: No focal deficit present  Mental Status: She is alert        Comments: Ambulatory with steady gait, negative cerebellar testing   Psychiatric:         Mood and Affect: Mood normal          Vital Signs  ED Triage Vitals   Temperature Pulse Respirations Blood Pressure SpO2   02/13/22 1133 02/13/22 1133 02/13/22 1133 02/13/22 1133 02/13/22 1133   98 6 °F (37 °C) (!) 125 16 123/57 98 %      Temp Source Heart Rate Source Patient Position - Orthostatic VS BP Location FiO2 (%)   02/13/22 1133 02/13/22 1133 02/13/22 1133 02/13/22 1133 --   Oral Monitor Sitting Left arm       Pain Score       02/13/22 1342       10 - Worst Possible Pain           Vitals:    02/13/22 1133 02/13/22 1339 02/13/22 1409 02/13/22 1415   BP: 123/57 114/71 131/88 131/88   Pulse: (!) 125 93 105 100   Patient Position - Orthostatic VS: Sitting Sitting Sitting Sitting         Visual Acuity      ED Medications  Medications   dicyclomine (BENTYL) tablet 20 mg (has no administration in time range)   sodium chloride 0 9 % bolus 1,000 mL (1,000 mL Intravenous New Bag 2/13/22 1339)   ondansetron (ZOFRAN) injection 4 mg (4 mg Intravenous Given 2/13/22 1341)   morphine (PF) 4 mg/mL injection 4 mg (4 mg Intravenous Given 2/13/22 1344)   ketorolac (TORADOL) injection 15 mg (15 mg Intravenous Given 2/13/22 1342)   iohexol (OMNIPAQUE) 350 MG/ML injection (SINGLE-DOSE) 100 mL (100 mL Intravenous Given 2/13/22 1424)       Diagnostic Studies  Results Reviewed     Procedure Component Value Units Date/Time    COVID/FLU/RSV - 2 hour TAT [[id number]] (Normal) Collected: 02/13/22 1331    Lab Status: Final result Specimen: Nares from Nose Updated: 02/13/22 1433     SARS-CoV-2 Negative     INFLUENZA A PCR Negative     INFLUENZA B PCR Negative     RSV PCR Negative    Narrative:      FOR PEDIATRIC PATIENTS - copy/paste COVID Guidelines URL to browser: [email or web address]    SARS-CoV-2 assay is a Nucleic Acid Amplification assay intended for the  qualitative detection of nucleic acid from SARS-CoV-2 in nasopharyngeal  swabs  Results are for the presumptive identification of SARS-CoV-2 RNA  Positive results are indicative of infection with SARS-CoV-2, the virus  causing COVID-19, but do not rule out bacterial infection or co-infection  with other viruses  Laboratories within the [place] and its  territories are required to report all positive results to the appropriate  public health authorities  Negative results do not preclude SARS-CoV-2  infection and should not be used as the sole basis for treatment or other  patient management decisions  Negative results must be combined with  clinical observations, patient history, and epidemiological information  This test has not been FDA cleared or approved  This test has been authorized by FDA under an Emergency Use Authorization  (EUA)  This test is only authorized for the duration of time the  declaration that circumstances exist justifying the authorization of the  emergency use of an in vitro diagnostic tests for detection of SARS-CoV-2  virus and/or diagnosis of COVID-19 infection under section 564(b)(1) of  the Act, 21 U  S C  369RSQ-1(K)(9), unless the authorization is terminated  or revoked sooner  The test has been validated but independent review by FDA  and CLIA is pending  Test performed using G-[employer] GeneXpert: This RT-PCR assay targets N2,  a region unique to SARS-CoV-2   A conserved region in the E-gene was chosen  for pan-Sarbecovirus detection which includes SARS-CoV-2      HS Troponin 0hr (reflex protocol) [[id number]]  (Normal) Collected: 02/13/22 1331    Lab Status: Final result Specimen: Blood from Arm, Left Updated: 02/13/22 1407     hs TnI 0hr <2 ng/L     HS Troponin I 2hr [[id number]]     Lab Status: No result Specimen: Blood     Comprehensive metabolic panel [[id number]] Collected: 02/13/22 1331    Lab Status: Final result Specimen: Blood from Arm, Left Updated: 02/13/22 1404     Sodium 140 mmol/L      Potassium 4 6 mmol/L      Chloride 103 mmol/L      CO2 27 mmol/L      ANION GAP 10 mmol/L      BUN 20 mg/dL      Creatinine 0 80 mg/dL      Glucose 99 mg/dL      Calcium 8 8 mg/dL      AST 21 U/L      ALT 15 U/L      Alkaline Phosphatase 65 U/L      Total Protein 7 6 g/dL      Albumin 3 9 g/dL      Total Bilirubin 0 78 mg/dL      eGFR 104 ml/min/1 73sq m     Narrative:      [place] guidelines for Chronic Kidney Disease (CKD):     Stage 1 with normal or high GFR (GFR > 90 mL/min/1 73 square meters)    Stage 2 Mild CKD (GFR = 60-89 mL/min/1 73 square meters)    Stage 3A Moderate CKD (GFR = 45-59 mL/min/1 73 square meters)    Stage 3B Moderate CKD (GFR = 30-44 mL/min/1 73 square meters)    Stage 4 Severe CKD (GFR = 15-29 mL/min/1 73 square meters)    Stage 5 End Stage CKD (GFR <15 mL/min/1 73 square meters)  Note: GFR calculation is accurate only with a steady state creatinine    Lipase [[id number]]  (Normal) Collected: 02/13/22 1331    Lab Status: Final result Specimen: Blood from Arm, Left Updated: 02/13/22 1404     Lipase 75 u/L     Magnesium [[id number]]  (Normal) Collected: 02/13/22 1331    Lab Status: Final result Specimen: Blood from Arm, Left Updated: 02/13/22 1404     Magnesium 2 0 mg/dL     hCG, qualitative pregnancy [[id number]]  (Normal) Collected: 02/13/22 1331    Lab Status: Final result Specimen: Blood from Arm, Left Updated: 02/13/22 1404     Preg, Serum Negative    CBC and differential [[id number]] (Abnormal) Collected: 02/13/22 1331    Lab Status: Final result Specimen: Blood from Arm, Left Updated: 02/13/22 1339     WBC 11 78 Thousand/uL      RBC 4 97 Million/uL      Hemoglobin 14 8 g/dL      Hematocrit 45 4 %      MCV 91 fL      MCH 29 8 pg      MCHC 32 6 g/dL      RDW 13 0 %      MPV 10 4 fL      Platelets 479 Thousands/uL      nRBC 0 /100 WBCs      Neutrophils Relative 88 %      Immat GRANS % 0 %      Lymphocytes Relative 5 %      Monocytes Relative 7 %      Eosinophils Relative 0 %      Basophils Relative 0 %      Neutrophils Absolute 10 37 Thousands/µL      Immature Grans Absolute 0 02 Thousand/uL      Lymphocytes Absolute 0 58 Thousands/µL      Monocytes Absolute 0 78 Thousand/µL      Eosinophils Absolute 0 01 Thousand/µL      Basophils Absolute 0 02 Thousands/µL     Rapid drug screen, urine [[id number]]     Lab Status: No result Specimen: Urine     POCT alcohol breath test [[id number]]     Lab Status: No result                  CT abdomen pelvis with contrast   Final Result by Matthew Nettles MD (02/13 2618)      No acute intra-abdominal finding  Few prominent fluid-filled distal small bowel loops may suggest enteritis        Workstation performed: [id number]         XR chest 2 views   ED Interpretation by Darci Paget, DO (02/13 1256)   NAD                 Procedures  ECG 12 Lead Documentation Only    Date/Time: 2/13/2022 1:50 PM  Performed by: Darci Paget, DO  Authorized by: Darci Paget, DO     ECG reviewed by me, the ED Provider: yes    Patient location:  ED  Previous ECG:     Previous ECG:  Compared to current    Similarity:  No change  Interpretation:     Interpretation: non-specific    Rhythm:     Rhythm: sinus rhythm    Ectopy:     Ectopy: none    QRS:     QRS axis:  Normal    QRS intervals:  Normal  Conduction:     Conduction: normal    ST segments:     ST segments:  Normal  T waves:     T waves: non-specific and inverted      Inverted:  III, aVF and V3 ED Course  ED Course as of 02/13/22 1509   Sun Feb 13, 2022   1451 Patient with findings of enteritis, will give symptomatic management consult crisis for further evaluation for SI    1508 Crisis has evaluated the patient who denies SI, HI  Patient has no prior hospitalizations, does not take any psychiatric medications, has no prior attempts, and noted vague suicidal ideation times months with no plan  Patient was provided outpatient follow-up and feel that she is safe for discharge at this time  Patient does have enteritis on CT, will discharge with Zofran and Bentyl and outpatient follow-up  [facility]  Number of Diagnoses or Management Options  Diagnosis management comments: Patient is a 69-year-old female no past medical history presenting for abdominal pain, vomiting, SI  Patient is well-appearing bedside though with tachycardia to 125, diffuse abdominal tenderness without guarding, no CVA tenderness, no gross abnormalities on neurologic exam is ambulatory bedside with steady gait  Will obtain labs, CT abdomen pelvis and give symptomatic management and if workup unremarkable will consult crisis for further management of SI  Disposition  Final diagnoses:   Gastroenteritis   Encounter for psychological evaluation     Time reflects when diagnosis was documented in both MDM as applicable and the Disposition within this note     Time User Action Codes Description Comment    2/13/2022  3:08 PM Minetta Curet Add [K52 9] Gastroenteritis     2/13/2022  3:09 PM Minetta Curet Add [Z00 8] Encounter for psychological evaluation       ED Disposition     ED Disposition Condition Date/Time Comment    Discharge Stable Sun Feb 13, 2022  3:08 PM Rigoberto Hernández discharge to home/self care              Follow-up Information     Follow up With Specialties Details Why Contact Info    Oskar Gustafson MD Family Medicine In 1 week  [address] [place] [id number]  [phone]            Patient's Medications   Discharge Prescriptions    DICYCLOMINE (BENTYL) 20 MG TABLET    Take 1 tablet (20 mg total) by mouth 2 (two) times a day       Start Date: 2/13/2022 End Date: --       Order Dose: 20 mg       Quantity: 20 tablet    Refills: 0    ONDANSETRON (ZOFRAN ODT) 4 MG DISINTEGRATING TABLET    Take 1 tablet (4 mg total) by mouth every 6 (six) hours as needed for nausea or vomiting       Start Date: 2/13/2022 End Date: --       Order Dose: 4 mg       Quantity: 20 tablet    Refills: 0       No discharge procedures on file      PDMP Review     None          ED Provider  Electronically Signed by           Boyd Moran DO  02/13/22 8751

## (undated) DEVICE — CHLORAPREP HI-LITE 26ML ORANGE

## (undated) DEVICE — SAW BLADE 20 X 6.4/2.9 X 0.6MM TRAPEZOID F/RECIP SAW

## (undated) DEVICE — INTENDED FOR TISSUE SEPARATION, AND OTHER PROCEDURES THAT REQUIRE A SHARP SURGICAL BLADE TO PUNCTURE OR CUT.: Brand: BARD-PARKER SAFETY BLADES SIZE 11, STERILE

## (undated) DEVICE — NEURO PATTIES 1/2 X 3

## (undated) DEVICE — SUCTION CATH 18 FR

## (undated) DEVICE — ELECTRODE BLADE MOD E-Z CLEAN 2.5IN 6.4CM -0012M

## (undated) DEVICE — PLUMEPEN PRO 10FT

## (undated) DEVICE — MAYO STAND COVER: Brand: CONVERTORS

## (undated) DEVICE — SUT MONOCRYL PLUS 4-0 PS-2 18 IN MCP496G

## (undated) DEVICE — STERILE MANDIBLE PACK: Brand: CARDINAL HEALTH

## (undated) DEVICE — SUT CHROMIC 3-0 RB-1 27 IN U204H

## (undated) DEVICE — BURR EGG-SHAPED M Ø 4.0MM HEAD LENGTH 8.0MM

## (undated) DEVICE — SYRINGE 10ML LL

## (undated) DEVICE — INTENDED FOR TISSUE SEPARATION, AND OTHER PROCEDURES THAT REQUIRE A SHARP SURGICAL BLADE TO PUNCTURE OR CUT.: Brand: BARD-PARKER SAFETY BLADES SIZE 15, STERILE

## (undated) DEVICE — SAW BLADE 105 DEG ANGLED 12.0 X 4.5 X 0.4MM/SHAFT 70MM

## (undated) DEVICE — X-RAY DETECTABLE SPONGES,16 PLY: Brand: VISTEC

## (undated) DEVICE — ROSEBUD DISSECTORS: Brand: DEROYAL

## (undated) DEVICE — BATTERY PACK-STERILE FOR BATTERY POWERED DRIVER

## (undated) DEVICE — NEEDLE 25G X 1 1/2

## (undated) DEVICE — DRAPE SHEET THREE QUARTER

## (undated) DEVICE — ANTIBACTERIAL UNDYED BRAIDED (POLYGLACTIN 910), SYNTHETIC ABSORBABLE SUTURE: Brand: COATED VICRYL

## (undated) DEVICE — GLOVE SRG BIOGEL 8

## (undated) DEVICE — MATRIXMANDIBLE 1.5MM DRILL BIT J-LATCH W/8MM STOP/50MM: Brand: MATRIXMANDIBLE

## (undated) DEVICE — SPONGE LAP 18 X 18 IN

## (undated) DEVICE — BULB SYRINGE,IRRIGATION WITH PROTECTIVE CAP: Brand: DOVER

## (undated) DEVICE — IV CATH 14 G X 3 1/4 IN

## (undated) DEVICE — PENCIL ELECTROSURG E-Z CLEAN -0035H

## (undated) DEVICE — DENTAL BURR ROUND WHITE

## (undated) DEVICE — TUBING SUCTION 5MM X 12 FT

## (undated) DEVICE — SUT ETHILON 5-0 P-3 18 IN 698H

## (undated) DEVICE — GLOVE INDICATOR PI UNDERGLOVE SZ 8.5 BLUE

## (undated) DEVICE — INTENDED FOR TISSUE SEPARATION, AND OTHER PROCEDURES THAT REQUIRE A SHARP SURGICAL BLADE TO PUNCTURE OR CUT.: Brand: BARD-PARKER ® CARBON RIB-BACK BLADES

## (undated) DEVICE — DRAIN SPONGES,6 PLY: Brand: EXCILON

## (undated) DEVICE — SAW BLADE 27 X 6.4 X 0.6MM F/RECIP SAW

## (undated) DEVICE — BETHLEHEM UNIVERSAL OUTPATIENT: Brand: CARDINAL HEALTH

## (undated) DEVICE — 1840 FOAM BLOCK NEEDLE COUNTER: Brand: DEVON

## (undated) DEVICE — SAW BLADE 15 X 10 X 0.38MM F/SAGITTAL SAW

## (undated) DEVICE — PROXIMATE SKIN STAPLERS (35 WIDE) CONTAINS 35 STAINLESS STEEL STAPLES (FIXED HEAD): Brand: PROXIMATE

## (undated) DEVICE — MATRIXMANDIBLE 1.5MM DRILL BIT J-LATCH W/6MM STOP/50MM: Brand: MATRIXMANDIBLE

## (undated) DEVICE — SYRINGE 20ML LL

## (undated) DEVICE — ASTOUND STANDARD SURGICAL GOWN, XL: Brand: CONVERTORS

## (undated) DEVICE — TIBURON SPLIT SHEET: Brand: CONVERTORS

## (undated) DEVICE — GLOVE INDICATOR PI UNDERGLOVE SZ 7.5 BLUE

## (undated) DEVICE — STERILE TOOTHBRUSH: Brand: CENTURION

## (undated) DEVICE — GLOVE SRG BIOGEL 7

## (undated) DEVICE — 3000CC GUARDIAN II: Brand: GUARDIAN

## (undated) DEVICE — SPONGE STICK WITH PVP-I: Brand: KENDALL

## (undated) DEVICE — OCULAR CONFORMER - NON VENTED - MEDIUM: Brand: MEDPOR